# Patient Record
Sex: MALE | Race: WHITE | NOT HISPANIC OR LATINO | Employment: UNEMPLOYED | URBAN - METROPOLITAN AREA
[De-identification: names, ages, dates, MRNs, and addresses within clinical notes are randomized per-mention and may not be internally consistent; named-entity substitution may affect disease eponyms.]

---

## 2018-01-01 ENCOUNTER — APPOINTMENT (OUTPATIENT)
Dept: RADIOLOGY | Facility: MEDICAL CENTER | Age: 38
End: 2018-01-01
Attending: INTERNAL MEDICINE
Payer: MEDICAID

## 2018-01-01 ENCOUNTER — HOME CARE VISIT (OUTPATIENT)
Dept: HOSPICE | Facility: HOSPICE | Age: 38
End: 2018-01-01
Payer: MEDICAID

## 2018-01-01 ENCOUNTER — HOSPITAL ENCOUNTER (OUTPATIENT)
Facility: MEDICAL CENTER | Age: 38
End: 2018-07-31
Attending: EMERGENCY MEDICINE | Admitting: INTERNAL MEDICINE
Payer: MEDICAID

## 2018-01-01 ENCOUNTER — APPOINTMENT (OUTPATIENT)
Dept: RADIOLOGY | Facility: MEDICAL CENTER | Age: 38
End: 2018-01-01
Attending: EMERGENCY MEDICINE
Payer: MEDICAID

## 2018-01-01 ENCOUNTER — OFFICE VISIT (OUTPATIENT)
Dept: MEDICAL GROUP | Facility: MEDICAL CENTER | Age: 38
End: 2018-01-01
Attending: FAMILY MEDICINE
Payer: MEDICAID

## 2018-01-01 ENCOUNTER — TELEPHONE (OUTPATIENT)
Dept: CARDIOLOGY | Facility: MEDICAL CENTER | Age: 38
End: 2018-01-01

## 2018-01-01 ENCOUNTER — APPOINTMENT (OUTPATIENT)
Dept: RADIOLOGY | Facility: MEDICAL CENTER | Age: 38
DRG: 291 | End: 2018-01-01
Attending: EMERGENCY MEDICINE
Payer: MEDICAID

## 2018-01-01 ENCOUNTER — APPOINTMENT (OUTPATIENT)
Dept: RADIOLOGY | Facility: MEDICAL CENTER | Age: 38
End: 2018-01-01
Attending: NURSE PRACTITIONER
Payer: MEDICAID

## 2018-01-01 ENCOUNTER — OFFICE VISIT (OUTPATIENT)
Dept: CARDIOLOGY | Facility: MEDICAL CENTER | Age: 38
End: 2018-01-01
Payer: MEDICAID

## 2018-01-01 ENCOUNTER — HOSPITAL ENCOUNTER (OUTPATIENT)
Facility: MEDICAL CENTER | Age: 38
End: 2018-08-22
Attending: EMERGENCY MEDICINE | Admitting: HOSPITALIST
Payer: MEDICAID

## 2018-01-01 ENCOUNTER — PATIENT OUTREACH (OUTPATIENT)
Dept: HEALTH INFORMATION MANAGEMENT | Facility: OTHER | Age: 38
End: 2018-01-01

## 2018-01-01 ENCOUNTER — HOSPITAL ENCOUNTER (INPATIENT)
Facility: MEDICAL CENTER | Age: 38
LOS: 1 days | DRG: 291 | End: 2018-07-28
Attending: EMERGENCY MEDICINE | Admitting: HOSPITALIST
Payer: MEDICAID

## 2018-01-01 ENCOUNTER — TELEPHONE (OUTPATIENT)
Dept: MEDICAL GROUP | Facility: MEDICAL CENTER | Age: 38
End: 2018-01-01

## 2018-01-01 ENCOUNTER — HOSPICE ADMISSION (OUTPATIENT)
Dept: HOSPICE | Facility: HOSPICE | Age: 38
End: 2018-01-01
Payer: MEDICAID

## 2018-01-01 ENCOUNTER — HOSPITAL ENCOUNTER (EMERGENCY)
Facility: MEDICAL CENTER | Age: 38
End: 2018-06-26
Attending: EMERGENCY MEDICINE
Payer: MEDICAID

## 2018-01-01 VITALS
DIASTOLIC BLOOD PRESSURE: 76 MMHG | OXYGEN SATURATION: 97 % | HEIGHT: 72 IN | TEMPERATURE: 98.9 F | SYSTOLIC BLOOD PRESSURE: 110 MMHG | RESPIRATION RATE: 20 BRPM | HEART RATE: 111 BPM | WEIGHT: 222 LBS | BODY MASS INDEX: 30.07 KG/M2

## 2018-01-01 VITALS
RESPIRATION RATE: 18 BRPM | DIASTOLIC BLOOD PRESSURE: 70 MMHG | BODY MASS INDEX: 31.08 KG/M2 | SYSTOLIC BLOOD PRESSURE: 96 MMHG | WEIGHT: 229.5 LBS | TEMPERATURE: 98.8 F | HEIGHT: 72 IN | OXYGEN SATURATION: 90 % | HEART RATE: 87 BPM

## 2018-01-01 VITALS
TEMPERATURE: 97 F | HEIGHT: 72 IN | RESPIRATION RATE: 20 BRPM | OXYGEN SATURATION: 93 % | WEIGHT: 231 LBS | SYSTOLIC BLOOD PRESSURE: 116 MMHG | HEART RATE: 80 BPM | DIASTOLIC BLOOD PRESSURE: 76 MMHG | BODY MASS INDEX: 31.29 KG/M2

## 2018-01-01 VITALS
TEMPERATURE: 97.9 F | HEIGHT: 72 IN | DIASTOLIC BLOOD PRESSURE: 79 MMHG | OXYGEN SATURATION: 96 % | RESPIRATION RATE: 22 BRPM | BODY MASS INDEX: 30.34 KG/M2 | SYSTOLIC BLOOD PRESSURE: 143 MMHG | HEART RATE: 108 BPM | WEIGHT: 223.99 LBS

## 2018-01-01 VITALS
WEIGHT: 231.92 LBS | RESPIRATION RATE: 34 BRPM | OXYGEN SATURATION: 100 % | DIASTOLIC BLOOD PRESSURE: 84 MMHG | HEART RATE: 100 BPM | SYSTOLIC BLOOD PRESSURE: 123 MMHG | BODY MASS INDEX: 31.41 KG/M2 | TEMPERATURE: 97.6 F | HEIGHT: 72 IN

## 2018-01-01 VITALS
BODY MASS INDEX: 29.2 KG/M2 | OXYGEN SATURATION: 93 % | DIASTOLIC BLOOD PRESSURE: 74 MMHG | TEMPERATURE: 97 F | HEIGHT: 72 IN | WEIGHT: 215.61 LBS | SYSTOLIC BLOOD PRESSURE: 110 MMHG | HEART RATE: 96 BPM | RESPIRATION RATE: 16 BRPM

## 2018-01-01 VITALS
SYSTOLIC BLOOD PRESSURE: 124 MMHG | WEIGHT: 236 LBS | BODY MASS INDEX: 31.97 KG/M2 | HEART RATE: 112 BPM | HEIGHT: 72 IN | OXYGEN SATURATION: 93 % | DIASTOLIC BLOOD PRESSURE: 88 MMHG

## 2018-01-01 VITALS
BODY MASS INDEX: 31.13 KG/M2 | WEIGHT: 229.8 LBS | OXYGEN SATURATION: 97 % | HEIGHT: 72 IN | HEART RATE: 110 BPM | DIASTOLIC BLOOD PRESSURE: 62 MMHG | SYSTOLIC BLOOD PRESSURE: 110 MMHG

## 2018-01-01 VITALS
SYSTOLIC BLOOD PRESSURE: 112 MMHG | HEART RATE: 94 BPM | HEIGHT: 72 IN | BODY MASS INDEX: 32.1 KG/M2 | WEIGHT: 237 LBS | DIASTOLIC BLOOD PRESSURE: 82 MMHG | OXYGEN SATURATION: 95 %

## 2018-01-01 VITALS
DIASTOLIC BLOOD PRESSURE: 70 MMHG | WEIGHT: 239 LBS | SYSTOLIC BLOOD PRESSURE: 110 MMHG | BODY MASS INDEX: 32.37 KG/M2 | HEIGHT: 72 IN | OXYGEN SATURATION: 97 % | HEART RATE: 84 BPM

## 2018-01-01 DIAGNOSIS — I50.9 ACUTE ON CHRONIC CONGESTIVE HEART FAILURE, UNSPECIFIED HEART FAILURE TYPE (HCC): ICD-10-CM

## 2018-01-01 DIAGNOSIS — I42.7 CARDIOMYOPATHY SECONDARY TO DRUG (HCC): ICD-10-CM

## 2018-01-01 DIAGNOSIS — I50.20 ACC/AHA STAGE C SYSTOLIC HEART FAILURE (HCC): ICD-10-CM

## 2018-01-01 DIAGNOSIS — I51.89 LEFT VENTRICULAR SYSTOLIC DYSFUNCTION, NYHA CLASS 3: ICD-10-CM

## 2018-01-01 DIAGNOSIS — Z91.148 NONCOMPLIANCE WITH MEDICATION REGIMEN: ICD-10-CM

## 2018-01-01 DIAGNOSIS — I50.43 ACUTE ON CHRONIC COMBINED SYSTOLIC AND DIASTOLIC CONGESTIVE HEART FAILURE (HCC): ICD-10-CM

## 2018-01-01 DIAGNOSIS — R07.9 CHEST PAIN, UNSPECIFIED TYPE: ICD-10-CM

## 2018-01-01 DIAGNOSIS — Z79.899 HIGH RISK MEDICATION USE: ICD-10-CM

## 2018-01-01 DIAGNOSIS — F41.1 GENERALIZED ANXIETY DISORDER: ICD-10-CM

## 2018-01-01 DIAGNOSIS — R06.89 DIFFICULTY BREATHING: ICD-10-CM

## 2018-01-01 DIAGNOSIS — I50.20 NYHA CLASS 4 HEART FAILURE WITH REDUCED EJECTION FRACTION (HCC): ICD-10-CM

## 2018-01-01 DIAGNOSIS — I50.23 ACUTE ON CHRONIC SYSTOLIC CONGESTIVE HEART FAILURE (HCC): ICD-10-CM

## 2018-01-01 DIAGNOSIS — F32.A ANXIETY AND DEPRESSION: ICD-10-CM

## 2018-01-01 DIAGNOSIS — I50.9 HEART FAILURE, NYHA CLASS 3 (HCC): ICD-10-CM

## 2018-01-01 DIAGNOSIS — F19.10 POLYSUBSTANCE ABUSE (HCC): ICD-10-CM

## 2018-01-01 DIAGNOSIS — E66.9 OBESITY (BMI 30-39.9): ICD-10-CM

## 2018-01-01 DIAGNOSIS — I50.9 CHF, STAGE C (HCC): ICD-10-CM

## 2018-01-01 DIAGNOSIS — I50.42 CHRONIC COMBINED SYSTOLIC AND DIASTOLIC CONGESTIVE HEART FAILURE (HCC): ICD-10-CM

## 2018-01-01 DIAGNOSIS — F41.9 ANXIETY AND DEPRESSION: ICD-10-CM

## 2018-01-01 DIAGNOSIS — I50.20 NYHA CLASS 3 SYSTOLIC CONGESTIVE HEART FAILURE WITH REDUCED LEFT VENTRICULAR FUNCTION (HCC): ICD-10-CM

## 2018-01-01 DIAGNOSIS — I50.22 CHRONIC SYSTOLIC (CONGESTIVE) HEART FAILURE (HCC): ICD-10-CM

## 2018-01-01 DIAGNOSIS — R06.02 SOB (SHORTNESS OF BREATH): ICD-10-CM

## 2018-01-01 DIAGNOSIS — F41.9 ANXIETY: ICD-10-CM

## 2018-01-01 DIAGNOSIS — N52.9 ERECTILE DYSFUNCTION, UNSPECIFIED ERECTILE DYSFUNCTION TYPE: ICD-10-CM

## 2018-01-01 DIAGNOSIS — G47.00 INSOMNIA, UNSPECIFIED TYPE: ICD-10-CM

## 2018-01-01 DIAGNOSIS — R06.09 DOE (DYSPNEA ON EXERTION): ICD-10-CM

## 2018-01-01 LAB
ALBUMIN SERPL BCP-MCNC: 3.7 G/DL (ref 3.2–4.9)
ALBUMIN SERPL BCP-MCNC: 3.7 G/DL (ref 3.2–4.9)
ALBUMIN SERPL BCP-MCNC: 3.8 G/DL (ref 3.2–4.9)
ALBUMIN SERPL BCP-MCNC: 3.9 G/DL (ref 3.2–4.9)
ALBUMIN SERPL BCP-MCNC: 4.1 G/DL (ref 3.2–4.9)
ALBUMIN SERPL BCP-MCNC: 4.2 G/DL (ref 3.2–4.9)
ALBUMIN/GLOB SERPL: 1.2 G/DL
ALBUMIN/GLOB SERPL: 1.3 G/DL
ALBUMIN/GLOB SERPL: 1.3 G/DL
ALBUMIN/GLOB SERPL: 1.4 G/DL
ALBUMIN/GLOB SERPL: 1.5 G/DL
ALBUMIN/GLOB SERPL: 1.6 G/DL
ALP SERPL-CCNC: 39 U/L (ref 30–99)
ALP SERPL-CCNC: 46 U/L (ref 30–99)
ALP SERPL-CCNC: 46 U/L (ref 30–99)
ALP SERPL-CCNC: 47 U/L (ref 30–99)
ALP SERPL-CCNC: 47 U/L (ref 30–99)
ALP SERPL-CCNC: 55 U/L (ref 30–99)
ALT SERPL-CCNC: 137 U/L (ref 2–50)
ALT SERPL-CCNC: 21 U/L (ref 2–50)
ALT SERPL-CCNC: 43 U/L (ref 2–50)
ALT SERPL-CCNC: 43 U/L (ref 2–50)
ALT SERPL-CCNC: 54 U/L (ref 2–50)
ALT SERPL-CCNC: 57 U/L (ref 2–50)
AMPHET UR QL SCN: NEGATIVE
ANION GAP SERPL CALC-SCNC: 10 MMOL/L (ref 0–11.9)
ANION GAP SERPL CALC-SCNC: 10 MMOL/L (ref 0–11.9)
ANION GAP SERPL CALC-SCNC: 12 MMOL/L (ref 0–11.9)
ANION GAP SERPL CALC-SCNC: 8 MMOL/L (ref 0–11.9)
ANION GAP SERPL CALC-SCNC: 9 MMOL/L (ref 0–11.9)
ANION GAP SERPL CALC-SCNC: 9 MMOL/L (ref 0–11.9)
APPEARANCE UR: CLEAR
APPEARANCE UR: CLEAR
APTT PPP: 29.3 SEC (ref 24.7–36)
AST SERPL-CCNC: 18 U/L (ref 12–45)
AST SERPL-CCNC: 19 U/L (ref 12–45)
AST SERPL-CCNC: 20 U/L (ref 12–45)
AST SERPL-CCNC: 22 U/L (ref 12–45)
AST SERPL-CCNC: 31 U/L (ref 12–45)
AST SERPL-CCNC: 35 U/L (ref 12–45)
BACTERIA BLD CULT: NORMAL
BACTERIA UR CULT: NORMAL
BARBITURATES UR QL SCN: NEGATIVE
BASOPHILS # BLD AUTO: 0.6 % (ref 0–1.8)
BASOPHILS # BLD AUTO: 0.7 % (ref 0–1.8)
BASOPHILS # BLD AUTO: 0.8 % (ref 0–1.8)
BASOPHILS # BLD AUTO: 0.8 % (ref 0–1.8)
BASOPHILS # BLD AUTO: 0.9 % (ref 0–1.8)
BASOPHILS # BLD AUTO: 1 % (ref 0–1.8)
BASOPHILS # BLD AUTO: 1.3 % (ref 0–1.8)
BASOPHILS # BLD AUTO: 1.3 % (ref 0–1.8)
BASOPHILS # BLD: 0.06 K/UL (ref 0–0.12)
BASOPHILS # BLD: 0.07 K/UL (ref 0–0.12)
BASOPHILS # BLD: 0.08 K/UL (ref 0–0.12)
BASOPHILS # BLD: 0.09 K/UL (ref 0–0.12)
BASOPHILS # BLD: 0.1 K/UL (ref 0–0.12)
BASOPHILS # BLD: 0.13 K/UL (ref 0–0.12)
BENZODIAZ UR QL SCN: NEGATIVE
BENZODIAZ UR QL SCN: POSITIVE
BENZODIAZ UR QL SCN: POSITIVE
BILIRUB SERPL-MCNC: 0.5 MG/DL (ref 0.1–1.5)
BILIRUB SERPL-MCNC: 0.6 MG/DL (ref 0.1–1.5)
BILIRUB SERPL-MCNC: 0.7 MG/DL (ref 0.1–1.5)
BILIRUB SERPL-MCNC: 0.7 MG/DL (ref 0.1–1.5)
BILIRUB SERPL-MCNC: 0.9 MG/DL (ref 0.1–1.5)
BILIRUB SERPL-MCNC: 1 MG/DL (ref 0.1–1.5)
BILIRUB UR QL STRIP.AUTO: NEGATIVE
BILIRUB UR QL STRIP.AUTO: NEGATIVE
BNP SERPL-MCNC: 382 PG/ML (ref 0–100)
BNP SERPL-MCNC: 598 PG/ML (ref 0–100)
BNP SERPL-MCNC: 640 PG/ML (ref 0–100)
BNP SERPL-MCNC: 641 PG/ML (ref 0–100)
BNP SERPL-MCNC: 714 PG/ML (ref 0–100)
BUN SERPL-MCNC: 14 MG/DL (ref 8–22)
BUN SERPL-MCNC: 15 MG/DL (ref 8–22)
BUN SERPL-MCNC: 16 MG/DL (ref 8–22)
BUN SERPL-MCNC: 17 MG/DL (ref 8–22)
BUN SERPL-MCNC: 20 MG/DL (ref 8–22)
BUN SERPL-MCNC: 21 MG/DL (ref 8–22)
BUN SERPL-MCNC: 22 MG/DL (ref 8–22)
BUN SERPL-MCNC: 25 MG/DL (ref 8–22)
BZE UR QL SCN: NEGATIVE
CALCIUM SERPL-MCNC: 8.8 MG/DL (ref 8.5–10.5)
CALCIUM SERPL-MCNC: 8.9 MG/DL (ref 8.5–10.5)
CALCIUM SERPL-MCNC: 9 MG/DL (ref 8.5–10.5)
CALCIUM SERPL-MCNC: 9.1 MG/DL (ref 8.5–10.5)
CALCIUM SERPL-MCNC: 9.1 MG/DL (ref 8.5–10.5)
CALCIUM SERPL-MCNC: 9.8 MG/DL (ref 8.5–10.5)
CANNABINOIDS UR QL SCN: POSITIVE
CHLORIDE SERPL-SCNC: 100 MMOL/L (ref 96–112)
CHLORIDE SERPL-SCNC: 101 MMOL/L (ref 96–112)
CHLORIDE SERPL-SCNC: 102 MMOL/L (ref 96–112)
CHLORIDE SERPL-SCNC: 104 MMOL/L (ref 96–112)
CHLORIDE SERPL-SCNC: 104 MMOL/L (ref 96–112)
CHLORIDE SERPL-SCNC: 106 MMOL/L (ref 96–112)
CHLORIDE SERPL-SCNC: 107 MMOL/L (ref 96–112)
CHLORIDE SERPL-SCNC: 107 MMOL/L (ref 96–112)
CHOLEST SERPL-MCNC: 150 MG/DL (ref 100–199)
CK MB SERPL-MCNC: 1.4 NG/ML (ref 0–5)
CK SERPL-CCNC: 49 U/L (ref 0–154)
CO2 SERPL-SCNC: 19 MMOL/L (ref 20–33)
CO2 SERPL-SCNC: 20 MMOL/L (ref 20–33)
CO2 SERPL-SCNC: 23 MMOL/L (ref 20–33)
CO2 SERPL-SCNC: 24 MMOL/L (ref 20–33)
CO2 SERPL-SCNC: 25 MMOL/L (ref 20–33)
CO2 SERPL-SCNC: 26 MMOL/L (ref 20–33)
COLOR UR: YELLOW
COLOR UR: YELLOW
CREAT SERPL-MCNC: 0.7 MG/DL (ref 0.5–1.4)
CREAT SERPL-MCNC: 0.78 MG/DL (ref 0.5–1.4)
CREAT SERPL-MCNC: 0.86 MG/DL (ref 0.5–1.4)
CREAT SERPL-MCNC: 0.89 MG/DL (ref 0.5–1.4)
CREAT SERPL-MCNC: 0.91 MG/DL (ref 0.5–1.4)
CREAT SERPL-MCNC: 0.91 MG/DL (ref 0.5–1.4)
CREAT SERPL-MCNC: 1.08 MG/DL (ref 0.5–1.4)
CREAT SERPL-MCNC: 1.14 MG/DL (ref 0.5–1.4)
CRP SERPL HS-MCNC: 1.94 MG/DL (ref 0–0.75)
DEPRECATED D DIMER PPP IA-ACNC: 209 NG/ML(D-DU)
DEPRECATED D DIMER PPP IA-ACNC: 238 NG/ML(D-DU)
EKG IMPRESSION: NORMAL
EOSINOPHIL # BLD AUTO: 0.17 K/UL (ref 0–0.51)
EOSINOPHIL # BLD AUTO: 0.17 K/UL (ref 0–0.51)
EOSINOPHIL # BLD AUTO: 0.23 K/UL (ref 0–0.51)
EOSINOPHIL # BLD AUTO: 0.27 K/UL (ref 0–0.51)
EOSINOPHIL # BLD AUTO: 0.31 K/UL (ref 0–0.51)
EOSINOPHIL # BLD AUTO: 0.31 K/UL (ref 0–0.51)
EOSINOPHIL # BLD AUTO: 0.41 K/UL (ref 0–0.51)
EOSINOPHIL # BLD AUTO: 0.42 K/UL (ref 0–0.51)
EOSINOPHIL NFR BLD: 1.6 % (ref 0–6.9)
EOSINOPHIL NFR BLD: 1.7 % (ref 0–6.9)
EOSINOPHIL NFR BLD: 2 % (ref 0–6.9)
EOSINOPHIL NFR BLD: 2.5 % (ref 0–6.9)
EOSINOPHIL NFR BLD: 2.9 % (ref 0–6.9)
EOSINOPHIL NFR BLD: 3.1 % (ref 0–6.9)
EOSINOPHIL NFR BLD: 5.2 % (ref 0–6.9)
EOSINOPHIL NFR BLD: 6.6 % (ref 0–6.9)
ERYTHROCYTE [DISTWIDTH] IN BLOOD BY AUTOMATED COUNT: 45.1 FL (ref 35.9–50)
ERYTHROCYTE [DISTWIDTH] IN BLOOD BY AUTOMATED COUNT: 45.7 FL (ref 35.9–50)
ERYTHROCYTE [DISTWIDTH] IN BLOOD BY AUTOMATED COUNT: 47.4 FL (ref 35.9–50)
ERYTHROCYTE [DISTWIDTH] IN BLOOD BY AUTOMATED COUNT: 47.5 FL (ref 35.9–50)
ERYTHROCYTE [DISTWIDTH] IN BLOOD BY AUTOMATED COUNT: 47.9 FL (ref 35.9–50)
ERYTHROCYTE [DISTWIDTH] IN BLOOD BY AUTOMATED COUNT: 48.6 FL (ref 35.9–50)
ERYTHROCYTE [DISTWIDTH] IN BLOOD BY AUTOMATED COUNT: 48.7 FL (ref 35.9–50)
ERYTHROCYTE [DISTWIDTH] IN BLOOD BY AUTOMATED COUNT: 49.3 FL (ref 35.9–50)
ERYTHROCYTE [SEDIMENTATION RATE] IN BLOOD BY WESTERGREN METHOD: 10 MM/HOUR (ref 0–15)
EST. AVERAGE GLUCOSE BLD GHB EST-MCNC: 111 MG/DL
EST. AVERAGE GLUCOSE BLD GHB EST-MCNC: 117 MG/DL
EST. AVERAGE GLUCOSE BLD GHB EST-MCNC: 120 MG/DL
GLOBULIN SER CALC-MCNC: 2.6 G/DL (ref 1.9–3.5)
GLOBULIN SER CALC-MCNC: 2.7 G/DL (ref 1.9–3.5)
GLOBULIN SER CALC-MCNC: 2.8 G/DL (ref 1.9–3.5)
GLOBULIN SER CALC-MCNC: 2.9 G/DL (ref 1.9–3.5)
GLOBULIN SER CALC-MCNC: 2.9 G/DL (ref 1.9–3.5)
GLOBULIN SER CALC-MCNC: 3.3 G/DL (ref 1.9–3.5)
GLUCOSE SERPL-MCNC: 106 MG/DL (ref 65–99)
GLUCOSE SERPL-MCNC: 108 MG/DL (ref 65–99)
GLUCOSE SERPL-MCNC: 117 MG/DL (ref 65–99)
GLUCOSE SERPL-MCNC: 126 MG/DL (ref 65–99)
GLUCOSE SERPL-MCNC: 172 MG/DL (ref 65–99)
GLUCOSE SERPL-MCNC: 203 MG/DL (ref 65–99)
GLUCOSE SERPL-MCNC: 84 MG/DL (ref 65–99)
GLUCOSE SERPL-MCNC: 87 MG/DL (ref 65–99)
GLUCOSE UR STRIP.AUTO-MCNC: NEGATIVE MG/DL
GLUCOSE UR STRIP.AUTO-MCNC: NEGATIVE MG/DL
HBA1C MFR BLD: 5.5 % (ref 0–5.6)
HBA1C MFR BLD: 5.7 % (ref 0–5.6)
HBA1C MFR BLD: 5.8 % (ref 0–5.6)
HCT VFR BLD AUTO: 36.5 % (ref 42–52)
HCT VFR BLD AUTO: 38.2 % (ref 42–52)
HCT VFR BLD AUTO: 38.2 % (ref 42–52)
HCT VFR BLD AUTO: 38.5 % (ref 42–52)
HCT VFR BLD AUTO: 38.7 % (ref 42–52)
HCT VFR BLD AUTO: 40.6 % (ref 42–52)
HCT VFR BLD AUTO: 41.1 % (ref 42–52)
HCT VFR BLD AUTO: 47.2 % (ref 42–52)
HDLC SERPL-MCNC: 19 MG/DL
HGB BLD-MCNC: 11.8 G/DL (ref 14–18)
HGB BLD-MCNC: 12.2 G/DL (ref 14–18)
HGB BLD-MCNC: 12.4 G/DL (ref 14–18)
HGB BLD-MCNC: 12.5 G/DL (ref 14–18)
HGB BLD-MCNC: 12.5 G/DL (ref 14–18)
HGB BLD-MCNC: 12.7 G/DL (ref 14–18)
HGB BLD-MCNC: 12.9 G/DL (ref 14–18)
HGB BLD-MCNC: 14.9 G/DL (ref 14–18)
IMM GRANULOCYTES # BLD AUTO: 0.03 K/UL (ref 0–0.11)
IMM GRANULOCYTES # BLD AUTO: 0.04 K/UL (ref 0–0.11)
IMM GRANULOCYTES # BLD AUTO: 0.04 K/UL (ref 0–0.11)
IMM GRANULOCYTES # BLD AUTO: 0.05 K/UL (ref 0–0.11)
IMM GRANULOCYTES # BLD AUTO: 0.05 K/UL (ref 0–0.11)
IMM GRANULOCYTES # BLD AUTO: 0.17 K/UL (ref 0–0.11)
IMM GRANULOCYTES NFR BLD AUTO: 0.3 % (ref 0–0.9)
IMM GRANULOCYTES NFR BLD AUTO: 0.4 % (ref 0–0.9)
IMM GRANULOCYTES NFR BLD AUTO: 0.5 % (ref 0–0.9)
IMM GRANULOCYTES NFR BLD AUTO: 1.3 % (ref 0–0.9)
INR PPP: 1.15 (ref 0.87–1.13)
KETONES UR STRIP.AUTO-MCNC: NEGATIVE MG/DL
KETONES UR STRIP.AUTO-MCNC: NEGATIVE MG/DL
LACTATE BLD-SCNC: 1.7 MMOL/L (ref 0.5–2)
LACTATE BLD-SCNC: 1.9 MMOL/L (ref 0.5–2)
LACTATE BLD-SCNC: 2 MMOL/L (ref 0.5–2)
LACTATE BLD-SCNC: 2.1 MMOL/L (ref 0.5–2)
LACTATE BLD-SCNC: 2.3 MMOL/L (ref 0.5–2)
LACTATE BLD-SCNC: 3.1 MMOL/L (ref 0.5–2)
LDLC SERPL CALC-MCNC: 96 MG/DL
LEUKOCYTE ESTERASE UR QL STRIP.AUTO: NEGATIVE
LEUKOCYTE ESTERASE UR QL STRIP.AUTO: NEGATIVE
LIPASE SERPL-CCNC: 38 U/L (ref 11–82)
LV EJECT FRACT  99904: 20
LV EJECT FRACT MOD 2C 99903: 19.09
LV EJECT FRACT MOD 4C 99902: 22.05
LV EJECT FRACT MOD BP 99901: 19.18
LYMPHOCYTES # BLD AUTO: 1.89 K/UL (ref 1–4.8)
LYMPHOCYTES # BLD AUTO: 2.52 K/UL (ref 1–4.8)
LYMPHOCYTES # BLD AUTO: 2.59 K/UL (ref 1–4.8)
LYMPHOCYTES # BLD AUTO: 2.66 K/UL (ref 1–4.8)
LYMPHOCYTES # BLD AUTO: 2.68 K/UL (ref 1–4.8)
LYMPHOCYTES # BLD AUTO: 2.81 K/UL (ref 1–4.8)
LYMPHOCYTES # BLD AUTO: 2.85 K/UL (ref 1–4.8)
LYMPHOCYTES # BLD AUTO: 3.05 K/UL (ref 1–4.8)
LYMPHOCYTES NFR BLD: 19.5 % (ref 22–41)
LYMPHOCYTES NFR BLD: 23.3 % (ref 22–41)
LYMPHOCYTES NFR BLD: 26.2 % (ref 22–41)
LYMPHOCYTES NFR BLD: 28.4 % (ref 22–41)
LYMPHOCYTES NFR BLD: 28.6 % (ref 22–41)
LYMPHOCYTES NFR BLD: 29.6 % (ref 22–41)
LYMPHOCYTES NFR BLD: 31.1 % (ref 22–41)
LYMPHOCYTES NFR BLD: 34 % (ref 22–41)
MAGNESIUM SERPL-MCNC: 2.3 MG/DL (ref 1.5–2.5)
MAGNESIUM SERPL-MCNC: 2.3 MG/DL (ref 1.5–2.5)
MCH RBC QN AUTO: 26.6 PG (ref 27–33)
MCH RBC QN AUTO: 27.3 PG (ref 27–33)
MCH RBC QN AUTO: 28.4 PG (ref 27–33)
MCH RBC QN AUTO: 28.9 PG (ref 27–33)
MCH RBC QN AUTO: 29.4 PG (ref 27–33)
MCH RBC QN AUTO: 29.5 PG (ref 27–33)
MCH RBC QN AUTO: 29.9 PG (ref 27–33)
MCH RBC QN AUTO: 30.9 PG (ref 27–33)
MCHC RBC AUTO-ENTMCNC: 30.9 G/DL (ref 33.7–35.3)
MCHC RBC AUTO-ENTMCNC: 30.9 G/DL (ref 33.7–35.3)
MCHC RBC AUTO-ENTMCNC: 31.6 G/DL (ref 33.7–35.3)
MCHC RBC AUTO-ENTMCNC: 31.8 G/DL (ref 33.7–35.3)
MCHC RBC AUTO-ENTMCNC: 31.9 G/DL (ref 33.7–35.3)
MCHC RBC AUTO-ENTMCNC: 32.2 G/DL (ref 33.7–35.3)
MCHC RBC AUTO-ENTMCNC: 32.3 G/DL (ref 33.7–35.3)
MCHC RBC AUTO-ENTMCNC: 34.2 G/DL (ref 33.7–35.3)
MCV RBC AUTO: 84.1 FL (ref 81.4–97.8)
MCV RBC AUTO: 88.4 FL (ref 81.4–97.8)
MCV RBC AUTO: 89.2 FL (ref 81.4–97.8)
MCV RBC AUTO: 90.1 FL (ref 81.4–97.8)
MCV RBC AUTO: 91.1 FL (ref 81.4–97.8)
MCV RBC AUTO: 92.5 FL (ref 81.4–97.8)
MCV RBC AUTO: 92.8 FL (ref 81.4–97.8)
MCV RBC AUTO: 93.6 FL (ref 81.4–97.8)
METHADONE UR QL SCN: NEGATIVE
MICRO URNS: NORMAL
MICRO URNS: NORMAL
MONOCYTES # BLD AUTO: 0.59 K/UL (ref 0–0.85)
MONOCYTES # BLD AUTO: 0.61 K/UL (ref 0–0.85)
MONOCYTES # BLD AUTO: 0.62 K/UL (ref 0–0.85)
MONOCYTES # BLD AUTO: 0.64 K/UL (ref 0–0.85)
MONOCYTES # BLD AUTO: 0.68 K/UL (ref 0–0.85)
MONOCYTES # BLD AUTO: 0.76 K/UL (ref 0–0.85)
MONOCYTES # BLD AUTO: 0.78 K/UL (ref 0–0.85)
MONOCYTES # BLD AUTO: 0.81 K/UL (ref 0–0.85)
MONOCYTES NFR BLD AUTO: 12.7 % (ref 0–13.4)
MONOCYTES NFR BLD AUTO: 4.8 % (ref 0–13.4)
MONOCYTES NFR BLD AUTO: 5.4 % (ref 0–13.4)
MONOCYTES NFR BLD AUTO: 5.6 % (ref 0–13.4)
MONOCYTES NFR BLD AUTO: 6.3 % (ref 0–13.4)
MONOCYTES NFR BLD AUTO: 6.4 % (ref 0–13.4)
MONOCYTES NFR BLD AUTO: 9.1 % (ref 0–13.4)
MONOCYTES NFR BLD AUTO: 9.7 % (ref 0–13.4)
NEUTROPHILS # BLD AUTO: 3.16 K/UL (ref 1.82–7.42)
NEUTROPHILS # BLD AUTO: 3.86 K/UL (ref 1.82–7.42)
NEUTROPHILS # BLD AUTO: 4.87 K/UL (ref 1.82–7.42)
NEUTROPHILS # BLD AUTO: 6.04 K/UL (ref 1.82–7.42)
NEUTROPHILS # BLD AUTO: 6.54 K/UL (ref 1.82–7.42)
NEUTROPHILS # BLD AUTO: 6.97 K/UL (ref 1.82–7.42)
NEUTROPHILS # BLD AUTO: 7.42 K/UL (ref 1.82–7.42)
NEUTROPHILS # BLD AUTO: 9.49 K/UL (ref 1.82–7.42)
NEUTROPHILS NFR BLD: 49.3 % (ref 44–72)
NEUTROPHILS NFR BLD: 49.4 % (ref 44–72)
NEUTROPHILS NFR BLD: 56.6 % (ref 44–72)
NEUTROPHILS NFR BLD: 60.9 % (ref 44–72)
NEUTROPHILS NFR BLD: 61.4 % (ref 44–72)
NEUTROPHILS NFR BLD: 64.2 % (ref 44–72)
NEUTROPHILS NFR BLD: 68.5 % (ref 44–72)
NEUTROPHILS NFR BLD: 71.7 % (ref 44–72)
NITRITE UR QL STRIP.AUTO: NEGATIVE
NITRITE UR QL STRIP.AUTO: NEGATIVE
NRBC # BLD AUTO: 0 K/UL
NRBC # BLD AUTO: 0.02 K/UL
NRBC # BLD AUTO: 0.03 K/UL
NRBC BLD-RTO: 0 /100 WBC
NRBC BLD-RTO: 0.2 /100 WBC
NRBC BLD-RTO: 0.3 /100 WBC
OPIATES UR QL SCN: NEGATIVE
OXYCODONE UR QL SCN: NEGATIVE
PCP UR QL SCN: NEGATIVE
PH UR STRIP.AUTO: 5 [PH]
PH UR STRIP.AUTO: 6.5 [PH]
PHOSPHATE SERPL-MCNC: 6.2 MG/DL (ref 2.5–4.5)
PLATELET # BLD AUTO: 238 K/UL (ref 164–446)
PLATELET # BLD AUTO: 250 K/UL (ref 164–446)
PLATELET # BLD AUTO: 253 K/UL (ref 164–446)
PLATELET # BLD AUTO: 318 K/UL (ref 164–446)
PLATELET # BLD AUTO: 359 K/UL (ref 164–446)
PLATELET # BLD AUTO: 378 K/UL (ref 164–446)
PLATELET # BLD AUTO: 391 K/UL (ref 164–446)
PLATELET # BLD AUTO: 395 K/UL (ref 164–446)
PMV BLD AUTO: 10 FL (ref 9–12.9)
PMV BLD AUTO: 10.2 FL (ref 9–12.9)
PMV BLD AUTO: 10.3 FL (ref 9–12.9)
PMV BLD AUTO: 10.4 FL (ref 9–12.9)
PMV BLD AUTO: 10.4 FL (ref 9–12.9)
PMV BLD AUTO: 9.7 FL (ref 9–12.9)
PMV BLD AUTO: 9.7 FL (ref 9–12.9)
PMV BLD AUTO: 9.8 FL (ref 9–12.9)
POTASSIUM SERPL-SCNC: 3.7 MMOL/L (ref 3.6–5.5)
POTASSIUM SERPL-SCNC: 4 MMOL/L (ref 3.6–5.5)
POTASSIUM SERPL-SCNC: 4.1 MMOL/L (ref 3.6–5.5)
POTASSIUM SERPL-SCNC: 4.3 MMOL/L (ref 3.6–5.5)
POTASSIUM SERPL-SCNC: 4.3 MMOL/L (ref 3.6–5.5)
POTASSIUM SERPL-SCNC: 4.4 MMOL/L (ref 3.6–5.5)
POTASSIUM SERPL-SCNC: 4.4 MMOL/L (ref 3.6–5.5)
POTASSIUM SERPL-SCNC: 4.6 MMOL/L (ref 3.6–5.5)
PROCALCITONIN SERPL-MCNC: 0.09 NG/ML
PROLACTIN SERPL-MCNC: 16.06 NG/ML (ref 2.1–17.7)
PROPOXYPH UR QL SCN: NEGATIVE
PROT SERPL-MCNC: 6.5 G/DL (ref 6–8.2)
PROT SERPL-MCNC: 6.6 G/DL (ref 6–8.2)
PROT SERPL-MCNC: 6.6 G/DL (ref 6–8.2)
PROT SERPL-MCNC: 6.7 G/DL (ref 6–8.2)
PROT SERPL-MCNC: 7 G/DL (ref 6–8.2)
PROT SERPL-MCNC: 7.2 G/DL (ref 6–8.2)
PROT UR QL STRIP: NEGATIVE MG/DL
PROT UR QL STRIP: NEGATIVE MG/DL
PROTHROMBIN TIME: 14.4 SEC (ref 12–14.6)
RBC # BLD AUTO: 4.05 M/UL (ref 4.7–6.1)
RBC # BLD AUTO: 4.08 M/UL (ref 4.7–6.1)
RBC # BLD AUTO: 4.13 M/UL (ref 4.7–6.1)
RBC # BLD AUTO: 4.15 M/UL (ref 4.7–6.1)
RBC # BLD AUTO: 4.25 M/UL (ref 4.7–6.1)
RBC # BLD AUTO: 4.55 M/UL (ref 4.7–6.1)
RBC # BLD AUTO: 4.65 M/UL (ref 4.7–6.1)
RBC # BLD AUTO: 5.61 M/UL (ref 4.7–6.1)
RBC UR QL AUTO: NEGATIVE
RBC UR QL AUTO: NEGATIVE
SIGNIFICANT IND 70042: NORMAL
SITE SITE: NORMAL
SODIUM SERPL-SCNC: 133 MMOL/L (ref 135–145)
SODIUM SERPL-SCNC: 136 MMOL/L (ref 135–145)
SODIUM SERPL-SCNC: 137 MMOL/L (ref 135–145)
SODIUM SERPL-SCNC: 138 MMOL/L (ref 135–145)
SODIUM SERPL-SCNC: 142 MMOL/L (ref 135–145)
SOURCE SOURCE: NORMAL
SP GR UR STRIP.AUTO: 1.01
SP GR UR STRIP.AUTO: 1.02
TRIGL SERPL-MCNC: 176 MG/DL (ref 0–149)
TROPONIN I SERPL-MCNC: 0.02 NG/ML (ref 0–0.04)
TROPONIN I SERPL-MCNC: 0.02 NG/ML (ref 0–0.04)
TROPONIN I SERPL-MCNC: 0.03 NG/ML (ref 0–0.04)
TSH SERPL DL<=0.005 MIU/L-ACNC: 1.04 UIU/ML (ref 0.38–5.33)
UROBILINOGEN UR STRIP.AUTO-MCNC: 0.2 MG/DL
UROBILINOGEN UR STRIP.AUTO-MCNC: 0.2 MG/DL
WBC # BLD AUTO: 10.7 K/UL (ref 4.8–10.8)
WBC # BLD AUTO: 10.8 K/UL (ref 4.8–10.8)
WBC # BLD AUTO: 10.9 K/UL (ref 4.8–10.8)
WBC # BLD AUTO: 13.3 K/UL (ref 4.8–10.8)
WBC # BLD AUTO: 6.4 K/UL (ref 4.8–10.8)
WBC # BLD AUTO: 7.8 K/UL (ref 4.8–10.8)
WBC # BLD AUTO: 8.6 K/UL (ref 4.8–10.8)
WBC # BLD AUTO: 9.9 K/UL (ref 4.8–10.8)

## 2018-01-01 PROCEDURE — 700105 HCHG RX REV CODE 258: Performed by: EMERGENCY MEDICINE

## 2018-01-01 PROCEDURE — 700102 HCHG RX REV CODE 250 W/ 637 OVERRIDE(OP): Performed by: NURSE PRACTITIONER

## 2018-01-01 PROCEDURE — 71045 X-RAY EXAM CHEST 1 VIEW: CPT

## 2018-01-01 PROCEDURE — G0378 HOSPITAL OBSERVATION PER HR: HCPCS

## 2018-01-01 PROCEDURE — 99214 OFFICE O/P EST MOD 30 MIN: CPT | Performed by: NURSE PRACTITIONER

## 2018-01-01 PROCEDURE — 36415 COLL VENOUS BLD VENIPUNCTURE: CPT

## 2018-01-01 PROCEDURE — 700102 HCHG RX REV CODE 250 W/ 637 OVERRIDE(OP): Performed by: PSYCHIATRY & NEUROLOGY

## 2018-01-01 PROCEDURE — 99225 PR SUBSEQUENT OBSERVATION CARE,LEVEL II: CPT | Performed by: FAMILY MEDICINE

## 2018-01-01 PROCEDURE — G8980 MOBILITY D/C STATUS: HCPCS | Mod: CI

## 2018-01-01 PROCEDURE — 80053 COMPREHEN METABOLIC PANEL: CPT

## 2018-01-01 PROCEDURE — 99284 EMERGENCY DEPT VISIT MOD MDM: CPT

## 2018-01-01 PROCEDURE — 99214 OFFICE O/P EST MOD 30 MIN: CPT | Performed by: INTERNAL MEDICINE

## 2018-01-01 PROCEDURE — A9270 NON-COVERED ITEM OR SERVICE: HCPCS | Performed by: INTERNAL MEDICINE

## 2018-01-01 PROCEDURE — 80307 DRUG TEST PRSMV CHEM ANLYZR: CPT

## 2018-01-01 PROCEDURE — 93005 ELECTROCARDIOGRAM TRACING: CPT

## 2018-01-01 PROCEDURE — A9270 NON-COVERED ITEM OR SERVICE: HCPCS | Performed by: FAMILY MEDICINE

## 2018-01-01 PROCEDURE — 83880 ASSAY OF NATRIURETIC PEPTIDE: CPT

## 2018-01-01 PROCEDURE — 99220 PR INITIAL OBSERVATION CARE,LEVL III: CPT | Performed by: HOSPITALIST

## 2018-01-01 PROCEDURE — 700102 HCHG RX REV CODE 250 W/ 637 OVERRIDE(OP): Performed by: HOSPITALIST

## 2018-01-01 PROCEDURE — 700111 HCHG RX REV CODE 636 W/ 250 OVERRIDE (IP): Performed by: FAMILY MEDICINE

## 2018-01-01 PROCEDURE — 99223 1ST HOSP IP/OBS HIGH 75: CPT | Performed by: HOSPITALIST

## 2018-01-01 PROCEDURE — 94760 N-INVAS EAR/PLS OXIMETRY 1: CPT

## 2018-01-01 PROCEDURE — 304561 HCHG STAT O2

## 2018-01-01 PROCEDURE — 700102 HCHG RX REV CODE 250 W/ 637 OVERRIDE(OP): Performed by: INTERNAL MEDICINE

## 2018-01-01 PROCEDURE — 99214 OFFICE O/P EST MOD 30 MIN: CPT | Performed by: FAMILY MEDICINE

## 2018-01-01 PROCEDURE — A9270 NON-COVERED ITEM OR SERVICE: HCPCS | Performed by: NURSE PRACTITIONER

## 2018-01-01 PROCEDURE — 80061 LIPID PANEL: CPT

## 2018-01-01 PROCEDURE — 80048 BASIC METABOLIC PNL TOTAL CA: CPT

## 2018-01-01 PROCEDURE — 81003 URINALYSIS AUTO W/O SCOPE: CPT

## 2018-01-01 PROCEDURE — 99224 PR SUBSEQUENT OBSERVATION CARE,LEVEL I: CPT | Performed by: FAMILY MEDICINE

## 2018-01-01 PROCEDURE — 83036 HEMOGLOBIN GLYCOSYLATED A1C: CPT

## 2018-01-01 PROCEDURE — 99225 PR SUBSEQUENT OBSERVATION CARE,LEVEL II: CPT | Performed by: HOSPITALIST

## 2018-01-01 PROCEDURE — 700102 HCHG RX REV CODE 250 W/ 637 OVERRIDE(OP): Performed by: FAMILY MEDICINE

## 2018-01-01 PROCEDURE — 700111 HCHG RX REV CODE 636 W/ 250 OVERRIDE (IP): Performed by: INTERNAL MEDICINE

## 2018-01-01 PROCEDURE — 99217 PR OBSERVATION CARE DISCHARGE: CPT | Performed by: HOSPITALIST

## 2018-01-01 PROCEDURE — 99203 OFFICE O/P NEW LOW 30 MIN: CPT | Performed by: FAMILY MEDICINE

## 2018-01-01 PROCEDURE — 83690 ASSAY OF LIPASE: CPT

## 2018-01-01 PROCEDURE — 700102 HCHG RX REV CODE 250 W/ 637 OVERRIDE(OP): Performed by: EMERGENCY MEDICINE

## 2018-01-01 PROCEDURE — 96375 TX/PRO/DX INJ NEW DRUG ADDON: CPT

## 2018-01-01 PROCEDURE — 700117 HCHG RX CONTRAST REV CODE 255: Performed by: INTERNAL MEDICINE

## 2018-01-01 PROCEDURE — A9270 NON-COVERED ITEM OR SERVICE: HCPCS | Performed by: STUDENT IN AN ORGANIZED HEALTH CARE EDUCATION/TRAINING PROGRAM

## 2018-01-01 PROCEDURE — 93308 TTE F-UP OR LMTD: CPT | Mod: 26 | Performed by: INTERNAL MEDICINE

## 2018-01-01 PROCEDURE — 93970 EXTREMITY STUDY: CPT

## 2018-01-01 PROCEDURE — 87040 BLOOD CULTURE FOR BACTERIA: CPT

## 2018-01-01 PROCEDURE — 84145 PROCALCITONIN (PCT): CPT

## 2018-01-01 PROCEDURE — 97165 OT EVAL LOW COMPLEX 30 MIN: CPT

## 2018-01-01 PROCEDURE — 700111 HCHG RX REV CODE 636 W/ 250 OVERRIDE (IP): Performed by: EMERGENCY MEDICINE

## 2018-01-01 PROCEDURE — 93005 ELECTROCARDIOGRAM TRACING: CPT | Performed by: INTERNAL MEDICINE

## 2018-01-01 PROCEDURE — 84484 ASSAY OF TROPONIN QUANT: CPT

## 2018-01-01 PROCEDURE — A9270 NON-COVERED ITEM OR SERVICE: HCPCS | Performed by: PSYCHIATRY & NEUROLOGY

## 2018-01-01 PROCEDURE — A9270 NON-COVERED ITEM OR SERVICE: HCPCS | Performed by: HOSPITALIST

## 2018-01-01 PROCEDURE — 700102 HCHG RX REV CODE 250 W/ 637 OVERRIDE(OP): Performed by: STUDENT IN AN ORGANIZED HEALTH CARE EDUCATION/TRAINING PROGRAM

## 2018-01-01 PROCEDURE — 99213 OFFICE O/P EST LOW 20 MIN: CPT | Performed by: FAMILY MEDICINE

## 2018-01-01 PROCEDURE — 93306 TTE W/DOPPLER COMPLETE: CPT

## 2018-01-01 PROCEDURE — 85025 COMPLETE CBC W/AUTO DIFF WBC: CPT

## 2018-01-01 PROCEDURE — 74018 RADEX ABDOMEN 1 VIEW: CPT

## 2018-01-01 PROCEDURE — 99214 OFFICE O/P EST MOD 30 MIN: CPT | Mod: 25 | Performed by: NURSE PRACTITIONER

## 2018-01-01 PROCEDURE — 85379 FIBRIN DEGRADATION QUANT: CPT

## 2018-01-01 PROCEDURE — 93308 TTE F-UP OR LMTD: CPT

## 2018-01-01 PROCEDURE — 99226 PR SUBSEQUENT OBSERVATION CARE,LEVEL III: CPT | Performed by: INTERNAL MEDICINE

## 2018-01-01 PROCEDURE — 96376 TX/PRO/DX INJ SAME DRUG ADON: CPT

## 2018-01-01 PROCEDURE — 700111 HCHG RX REV CODE 636 W/ 250 OVERRIDE (IP): Performed by: HOSPITALIST

## 2018-01-01 PROCEDURE — G8979 MOBILITY GOAL STATUS: HCPCS | Mod: CI

## 2018-01-01 PROCEDURE — 93005 ELECTROCARDIOGRAM TRACING: CPT | Performed by: EMERGENCY MEDICINE

## 2018-01-01 PROCEDURE — 96365 THER/PROPH/DIAG IV INF INIT: CPT

## 2018-01-01 PROCEDURE — 87086 URINE CULTURE/COLONY COUNT: CPT

## 2018-01-01 PROCEDURE — 93010 ELECTROCARDIOGRAM REPORT: CPT | Performed by: INTERNAL MEDICINE

## 2018-01-01 PROCEDURE — 84100 ASSAY OF PHOSPHORUS: CPT

## 2018-01-01 PROCEDURE — 0042T CT-CEREBRAL PERFUSION ANALYSIS: CPT

## 2018-01-01 PROCEDURE — 99285 EMERGENCY DEPT VISIT HI MDM: CPT

## 2018-01-01 PROCEDURE — 83605 ASSAY OF LACTIC ACID: CPT

## 2018-01-01 PROCEDURE — 94618 PULMONARY STRESS TESTING: CPT | Performed by: NURSE PRACTITIONER

## 2018-01-01 PROCEDURE — 99244 OFF/OP CNSLTJ NEW/EST MOD 40: CPT | Performed by: PSYCHIATRY & NEUROLOGY

## 2018-01-01 PROCEDURE — 99225 PR SUBSEQUENT OBSERVATION CARE,LEVEL II: CPT | Performed by: INTERNAL MEDICINE

## 2018-01-01 PROCEDURE — 93005 ELECTROCARDIOGRAM TRACING: CPT | Performed by: FAMILY MEDICINE

## 2018-01-01 PROCEDURE — 96374 THER/PROPH/DIAG INJ IV PUSH: CPT

## 2018-01-01 PROCEDURE — 82550 ASSAY OF CK (CPK): CPT

## 2018-01-01 PROCEDURE — 85025 COMPLETE CBC W/AUTO DIFF WBC: CPT | Mod: 91

## 2018-01-01 PROCEDURE — 70496 CT ANGIOGRAPHY HEAD: CPT

## 2018-01-01 PROCEDURE — 86140 C-REACTIVE PROTEIN: CPT

## 2018-01-01 PROCEDURE — 70498 CT ANGIOGRAPHY NECK: CPT

## 2018-01-01 PROCEDURE — 97161 PT EVAL LOW COMPLEX 20 MIN: CPT

## 2018-01-01 PROCEDURE — A9270 NON-COVERED ITEM OR SERVICE: HCPCS | Performed by: EMERGENCY MEDICINE

## 2018-01-01 PROCEDURE — 99239 HOSP IP/OBS DSCHRG MGMT >30: CPT | Performed by: INTERNAL MEDICINE

## 2018-01-01 PROCEDURE — 85610 PROTHROMBIN TIME: CPT

## 2018-01-01 PROCEDURE — G8989 SELF CARE D/C STATUS: HCPCS | Mod: CI

## 2018-01-01 PROCEDURE — G8987 SELF CARE CURRENT STATUS: HCPCS | Mod: CI

## 2018-01-01 PROCEDURE — 85652 RBC SED RATE AUTOMATED: CPT

## 2018-01-01 PROCEDURE — 770020 HCHG ROOM/CARE - TELE (206)

## 2018-01-01 PROCEDURE — 83735 ASSAY OF MAGNESIUM: CPT

## 2018-01-01 PROCEDURE — G8978 MOBILITY CURRENT STATUS: HCPCS | Mod: CI

## 2018-01-01 PROCEDURE — 84146 ASSAY OF PROLACTIN: CPT

## 2018-01-01 PROCEDURE — 85730 THROMBOPLASTIN TIME PARTIAL: CPT

## 2018-01-01 PROCEDURE — 82553 CREATINE MB FRACTION: CPT

## 2018-01-01 PROCEDURE — 84443 ASSAY THYROID STIM HORMONE: CPT

## 2018-01-01 PROCEDURE — 70450 CT HEAD/BRAIN W/O DYE: CPT

## 2018-01-01 PROCEDURE — 70551 MRI BRAIN STEM W/O DYE: CPT

## 2018-01-01 PROCEDURE — 93306 TTE W/DOPPLER COMPLETE: CPT | Mod: 26 | Performed by: INTERNAL MEDICINE

## 2018-01-01 PROCEDURE — G8988 SELF CARE GOAL STATUS: HCPCS | Mod: CI

## 2018-01-01 PROCEDURE — 95951 EEG: CPT | Mod: 52

## 2018-01-01 RX ORDER — TRAZODONE HYDROCHLORIDE 50 MG/1
50 TABLET ORAL ONCE
Status: COMPLETED | OUTPATIENT
Start: 2018-01-01 | End: 2018-01-01

## 2018-01-01 RX ORDER — LISINOPRIL 5 MG/1
5 TABLET ORAL DAILY
Status: DISCONTINUED | OUTPATIENT
Start: 2018-01-01 | End: 2018-01-01 | Stop reason: HOSPADM

## 2018-01-01 RX ORDER — LORAZEPAM 0.5 MG/1
0.25 TABLET ORAL EVERY 8 HOURS PRN
Status: DISCONTINUED | OUTPATIENT
Start: 2018-01-01 | End: 2018-01-01

## 2018-01-01 RX ORDER — POTASSIUM CHLORIDE 750 MG/1
10 TABLET, EXTENDED RELEASE ORAL DAILY
Qty: 30 TAB | Refills: 11 | Status: SHIPPED | OUTPATIENT
Start: 2018-01-01

## 2018-01-01 RX ORDER — LORAZEPAM 2 MG/ML
1 INJECTION INTRAMUSCULAR ONCE
Status: COMPLETED | OUTPATIENT
Start: 2018-01-01 | End: 2018-01-01

## 2018-01-01 RX ORDER — TRAMADOL HYDROCHLORIDE 50 MG/1
50 TABLET ORAL EVERY 4 HOURS PRN
Status: DISCONTINUED | OUTPATIENT
Start: 2018-01-01 | End: 2018-01-01 | Stop reason: HOSPADM

## 2018-01-01 RX ORDER — FUROSEMIDE 10 MG/ML
20 INJECTION INTRAMUSCULAR; INTRAVENOUS ONCE
Status: COMPLETED | OUTPATIENT
Start: 2018-01-01 | End: 2018-01-01

## 2018-01-01 RX ORDER — POLYETHYLENE GLYCOL 3350 17 G/17G
1 POWDER, FOR SOLUTION ORAL
Status: DISCONTINUED | OUTPATIENT
Start: 2018-01-01 | End: 2018-01-01

## 2018-01-01 RX ORDER — LORAZEPAM 2 MG/ML
1 INJECTION INTRAMUSCULAR
Status: DISCONTINUED | OUTPATIENT
Start: 2018-01-01 | End: 2018-01-01

## 2018-01-01 RX ORDER — LORAZEPAM 0.5 MG/1
0.25 TABLET ORAL EVERY 6 HOURS PRN
Status: DISCONTINUED | OUTPATIENT
Start: 2018-01-01 | End: 2018-01-01

## 2018-01-01 RX ORDER — MORPHINE SULFATE 10 MG/ML
10 INJECTION, SOLUTION INTRAMUSCULAR; INTRAVENOUS
Status: DISCONTINUED | OUTPATIENT
Start: 2018-01-01 | End: 2018-01-01

## 2018-01-01 RX ORDER — SPIRONOLACTONE 25 MG/1
25 TABLET ORAL DAILY
Qty: 30 TAB | Refills: 0 | Status: SHIPPED | OUTPATIENT
Start: 2018-01-01 | End: 2018-01-01 | Stop reason: SDUPTHER

## 2018-01-01 RX ORDER — SPIRONOLACTONE 50 MG/1
25 TABLET, FILM COATED ORAL DAILY
Status: DISCONTINUED | OUTPATIENT
Start: 2018-01-01 | End: 2018-01-01

## 2018-01-01 RX ORDER — ALPRAZOLAM 0.5 MG/1
0.5 TABLET ORAL ONCE
Status: COMPLETED | OUTPATIENT
Start: 2018-01-01 | End: 2018-01-01

## 2018-01-01 RX ORDER — CARVEDILOL 12.5 MG/1
12.5 TABLET ORAL 2 TIMES DAILY WITH MEALS
Status: DISCONTINUED | OUTPATIENT
Start: 2018-01-01 | End: 2018-01-01 | Stop reason: HOSPADM

## 2018-01-01 RX ORDER — FLUOXETINE 10 MG/1
10 CAPSULE ORAL DAILY
Status: DISCONTINUED | OUTPATIENT
Start: 2018-01-01 | End: 2018-01-01

## 2018-01-01 RX ORDER — BISACODYL 10 MG
10 SUPPOSITORY, RECTAL RECTAL
Status: DISCONTINUED | OUTPATIENT
Start: 2018-01-01 | End: 2018-01-01 | Stop reason: HOSPADM

## 2018-01-01 RX ORDER — CLONIDINE HYDROCHLORIDE 0.1 MG/1
0.1 TABLET ORAL EVERY 6 HOURS PRN
Status: DISCONTINUED | OUTPATIENT
Start: 2018-01-01 | End: 2018-01-01 | Stop reason: HOSPADM

## 2018-01-01 RX ORDER — POLYETHYLENE GLYCOL 3350 17 G/17G
1 POWDER, FOR SOLUTION ORAL
Status: DISCONTINUED | OUTPATIENT
Start: 2018-01-01 | End: 2018-01-01 | Stop reason: HOSPADM

## 2018-01-01 RX ORDER — ASPIRIN 300 MG/1
300 SUPPOSITORY RECTAL DAILY
Status: DISCONTINUED | OUTPATIENT
Start: 2018-01-01 | End: 2018-01-01

## 2018-01-01 RX ORDER — LISINOPRIL 5 MG/1
5 TABLET ORAL DAILY
Qty: 30 TAB | Refills: 11 | Status: SHIPPED | OUTPATIENT
Start: 2018-01-01

## 2018-01-01 RX ORDER — BISACODYL 10 MG
10 SUPPOSITORY, RECTAL RECTAL
Status: DISCONTINUED | OUTPATIENT
Start: 2018-01-01 | End: 2018-01-01

## 2018-01-01 RX ORDER — NITROGLYCERIN 0.4 MG/1
0.4 TABLET SUBLINGUAL ONCE
Status: COMPLETED | OUTPATIENT
Start: 2018-01-01 | End: 2018-01-01

## 2018-01-01 RX ORDER — AZITHROMYCIN 500 MG/1
500 INJECTION, POWDER, LYOPHILIZED, FOR SOLUTION INTRAVENOUS ONCE
Status: DISCONTINUED | OUTPATIENT
Start: 2018-01-01 | End: 2018-01-01

## 2018-01-01 RX ORDER — SODIUM CHLORIDE 9 MG/ML
1000 INJECTION, SOLUTION INTRAVENOUS ONCE
Status: COMPLETED | OUTPATIENT
Start: 2018-01-01 | End: 2018-01-01

## 2018-01-01 RX ORDER — SPIRONOLACTONE 25 MG/1
25 TABLET ORAL DAILY
Qty: 30 TAB | Refills: 0 | Status: SHIPPED | OUTPATIENT
Start: 2018-01-01 | End: 2018-01-01

## 2018-01-01 RX ORDER — LORAZEPAM 0.5 MG/1
0.5 TABLET ORAL EVERY 6 HOURS PRN
Status: DISCONTINUED | OUTPATIENT
Start: 2018-01-01 | End: 2018-01-01

## 2018-01-01 RX ORDER — METOCLOPRAMIDE 10 MG/1
5 TABLET ORAL
Status: DISCONTINUED | OUTPATIENT
Start: 2018-01-01 | End: 2018-01-01 | Stop reason: HOSPADM

## 2018-01-01 RX ORDER — ONDANSETRON 4 MG/1
4 TABLET, ORALLY DISINTEGRATING ORAL EVERY 4 HOURS PRN
Status: DISCONTINUED | OUTPATIENT
Start: 2018-01-01 | End: 2018-01-01 | Stop reason: HOSPADM

## 2018-01-01 RX ORDER — FUROSEMIDE 40 MG/1
40 TABLET ORAL DAILY
Qty: 30 TAB | Refills: 11 | Status: SHIPPED | OUTPATIENT
Start: 2018-01-01 | End: 2018-01-01 | Stop reason: SDUPTHER

## 2018-01-01 RX ORDER — SPIRONOLACTONE 25 MG/1
25 TABLET ORAL DAILY
Status: DISCONTINUED | OUTPATIENT
Start: 2018-01-01 | End: 2018-01-01 | Stop reason: HOSPADM

## 2018-01-01 RX ORDER — SPIRONOLACTONE 25 MG/1
25 TABLET ORAL DAILY
Qty: 30 TAB | Refills: 3 | Status: SHIPPED | OUTPATIENT
Start: 2018-01-01 | End: 2018-01-01

## 2018-01-01 RX ORDER — ASPIRIN 81 MG/1
81 TABLET ORAL DAILY
Qty: 30 TAB | Refills: 0 | Status: SHIPPED | OUTPATIENT
Start: 2018-01-01

## 2018-01-01 RX ORDER — CARVEDILOL 6.25 MG/1
12.5 TABLET ORAL 2 TIMES DAILY WITH MEALS
Status: DISCONTINUED | OUTPATIENT
Start: 2018-01-01 | End: 2018-01-01

## 2018-01-01 RX ORDER — SPIRONOLACTONE 25 MG/1
25 TABLET ORAL DAILY
Qty: 30 TAB | Refills: 3 | Status: ON HOLD | OUTPATIENT
Start: 2018-01-01 | End: 2018-01-01

## 2018-01-01 RX ORDER — ASPIRIN 300 MG/1
300 SUPPOSITORY RECTAL ONCE
Status: COMPLETED | OUTPATIENT
Start: 2018-01-01 | End: 2018-01-01

## 2018-01-01 RX ORDER — ASPIRIN 325 MG
325 TABLET ORAL DAILY
Status: DISCONTINUED | OUTPATIENT
Start: 2018-01-01 | End: 2018-01-01

## 2018-01-01 RX ORDER — ASPIRIN 81 MG/1
81 TABLET ORAL DAILY
Qty: 30 TAB | Refills: 0 | Status: SHIPPED | OUTPATIENT
Start: 2018-01-01 | End: 2018-01-01

## 2018-01-01 RX ORDER — LORAZEPAM 0.5 MG/1
0.5 TABLET ORAL EVERY 8 HOURS PRN
Qty: 15 TAB | Refills: 0 | Status: SHIPPED | OUTPATIENT
Start: 2018-01-01 | End: 2018-01-01

## 2018-01-01 RX ORDER — DIGOXIN 0.25 MG/ML
500 INJECTION INTRAMUSCULAR; INTRAVENOUS ONCE
Status: COMPLETED | OUTPATIENT
Start: 2018-01-01 | End: 2018-01-01

## 2018-01-01 RX ORDER — ALPRAZOLAM 0.5 MG/1
0.5 TABLET ORAL 2 TIMES DAILY PRN
Status: DISCONTINUED | OUTPATIENT
Start: 2018-01-01 | End: 2018-01-01

## 2018-01-01 RX ORDER — ASPIRIN 81 MG/1
81 TABLET, CHEWABLE ORAL DAILY
Qty: 100 TAB | Refills: 0 | Status: SHIPPED | OUTPATIENT
Start: 2018-01-01 | End: 2018-01-01

## 2018-01-01 RX ORDER — FUROSEMIDE 10 MG/ML
40 INJECTION INTRAMUSCULAR; INTRAVENOUS
Status: DISCONTINUED | OUTPATIENT
Start: 2018-01-01 | End: 2018-01-01

## 2018-01-01 RX ORDER — PROMETHAZINE HYDROCHLORIDE 25 MG/1
12.5-25 TABLET ORAL EVERY 4 HOURS PRN
Status: DISCONTINUED | OUTPATIENT
Start: 2018-01-01 | End: 2018-01-01 | Stop reason: HOSPADM

## 2018-01-01 RX ORDER — LISINOPRIL 5 MG/1
5 TABLET ORAL DAILY
Qty: 30 TAB | Refills: 0 | Status: SHIPPED | OUTPATIENT
Start: 2018-01-01 | End: 2018-01-01

## 2018-01-01 RX ORDER — TRAZODONE HYDROCHLORIDE 50 MG/1
50 TABLET ORAL
Qty: 30 TAB | Refills: 6 | Status: SHIPPED | OUTPATIENT
Start: 2018-01-01

## 2018-01-01 RX ORDER — ASPIRIN 81 MG/1
324 TABLET, CHEWABLE ORAL ONCE
Status: COMPLETED | OUTPATIENT
Start: 2018-01-01 | End: 2018-01-01

## 2018-01-01 RX ORDER — ASPIRIN 81 MG/1
324 TABLET, CHEWABLE ORAL DAILY
Status: DISCONTINUED | OUTPATIENT
Start: 2018-01-01 | End: 2018-01-01

## 2018-01-01 RX ORDER — ONDANSETRON 2 MG/ML
4 INJECTION INTRAMUSCULAR; INTRAVENOUS EVERY 4 HOURS PRN
Status: DISCONTINUED | OUTPATIENT
Start: 2018-01-01 | End: 2018-01-01 | Stop reason: HOSPADM

## 2018-01-01 RX ORDER — FUROSEMIDE 40 MG/1
40 TABLET ORAL 2 TIMES DAILY
Qty: 60 TAB | Refills: 11 | Status: SHIPPED | OUTPATIENT
Start: 2018-01-01 | End: 2018-01-01 | Stop reason: SDUPTHER

## 2018-01-01 RX ORDER — CARVEDILOL 25 MG/1
25 TABLET ORAL 2 TIMES DAILY WITH MEALS
Qty: 60 TAB | Refills: 11 | Status: SHIPPED | OUTPATIENT
Start: 2018-01-01

## 2018-01-01 RX ORDER — FUROSEMIDE 40 MG/1
40 TABLET ORAL DAILY
Qty: 30 TAB | Refills: 0 | Status: SHIPPED | OUTPATIENT
Start: 2018-01-01 | End: 2018-01-01 | Stop reason: SDUPTHER

## 2018-01-01 RX ORDER — FUROSEMIDE 10 MG/ML
20 INJECTION INTRAMUSCULAR; INTRAVENOUS
Status: DISCONTINUED | OUTPATIENT
Start: 2018-01-01 | End: 2018-01-01

## 2018-01-01 RX ORDER — CARVEDILOL 3.12 MG/1
3.12 TABLET ORAL 2 TIMES DAILY WITH MEALS
Status: DISCONTINUED | OUTPATIENT
Start: 2018-01-01 | End: 2018-01-01

## 2018-01-01 RX ORDER — PROMETHAZINE HYDROCHLORIDE 25 MG/1
12.5-25 SUPPOSITORY RECTAL EVERY 4 HOURS PRN
Status: DISCONTINUED | OUTPATIENT
Start: 2018-01-01 | End: 2018-01-01

## 2018-01-01 RX ORDER — CARVEDILOL 12.5 MG/1
12.5 TABLET ORAL 2 TIMES DAILY WITH MEALS
Qty: 60 TAB | Refills: 0 | Status: ON HOLD | OUTPATIENT
Start: 2018-01-01 | End: 2018-01-01

## 2018-01-01 RX ORDER — PROMETHAZINE HYDROCHLORIDE 25 MG/1
12.5-25 TABLET ORAL EVERY 4 HOURS PRN
Status: DISCONTINUED | OUTPATIENT
Start: 2018-01-01 | End: 2018-01-01

## 2018-01-01 RX ORDER — CARVEDILOL 12.5 MG/1
12.5 TABLET ORAL 2 TIMES DAILY WITH MEALS
Qty: 60 TAB | Refills: 11 | Status: SHIPPED | OUTPATIENT
Start: 2018-01-01 | End: 2018-01-01 | Stop reason: DRUGHIGH

## 2018-01-01 RX ORDER — ONDANSETRON 2 MG/ML
8 INJECTION INTRAMUSCULAR; INTRAVENOUS EVERY 8 HOURS PRN
Status: DISCONTINUED | OUTPATIENT
Start: 2018-01-01 | End: 2018-01-01

## 2018-01-01 RX ORDER — QUETIAPINE FUMARATE 25 MG/1
25 TABLET, FILM COATED ORAL EVERY 8 HOURS
Status: DISCONTINUED | OUTPATIENT
Start: 2018-01-01 | End: 2018-01-01 | Stop reason: HOSPADM

## 2018-01-01 RX ORDER — AMOXICILLIN 250 MG
2 CAPSULE ORAL 2 TIMES DAILY
Status: DISCONTINUED | OUTPATIENT
Start: 2018-01-01 | End: 2018-01-01 | Stop reason: HOSPADM

## 2018-01-01 RX ORDER — PROMETHAZINE HYDROCHLORIDE 12.5 MG/1
12.5-25 SUPPOSITORY RECTAL EVERY 4 HOURS PRN
Status: DISCONTINUED | OUTPATIENT
Start: 2018-01-01 | End: 2018-01-01 | Stop reason: HOSPADM

## 2018-01-01 RX ORDER — ASPIRIN 81 MG/1
81 TABLET, CHEWABLE ORAL DAILY
Status: DISCONTINUED | OUTPATIENT
Start: 2018-01-01 | End: 2018-01-01 | Stop reason: HOSPADM

## 2018-01-01 RX ORDER — ACETAMINOPHEN 325 MG/1
650 TABLET ORAL EVERY 6 HOURS PRN
Status: DISCONTINUED | OUTPATIENT
Start: 2018-01-01 | End: 2018-01-01 | Stop reason: HOSPADM

## 2018-01-01 RX ORDER — ASPIRIN 81 MG/1
81 TABLET, CHEWABLE ORAL DAILY
Qty: 100 TAB | Refills: 0 | Status: ON HOLD | OUTPATIENT
Start: 2018-01-01 | End: 2018-01-01

## 2018-01-01 RX ORDER — ONDANSETRON 4 MG/1
4 TABLET, ORALLY DISINTEGRATING ORAL EVERY 4 HOURS PRN
Status: DISCONTINUED | OUTPATIENT
Start: 2018-01-01 | End: 2018-01-01

## 2018-01-01 RX ORDER — POTASSIUM CHLORIDE 750 MG/1
10 TABLET, EXTENDED RELEASE ORAL DAILY
Qty: 30 TAB | Refills: 0 | Status: SHIPPED | OUTPATIENT
Start: 2018-01-01 | End: 2018-01-01

## 2018-01-01 RX ORDER — FUROSEMIDE 20 MG/1
20 TABLET ORAL
Status: DISCONTINUED | OUTPATIENT
Start: 2018-01-01 | End: 2018-01-01

## 2018-01-01 RX ORDER — HEPARIN SODIUM 5000 [USP'U]/ML
5000 INJECTION, SOLUTION INTRAVENOUS; SUBCUTANEOUS EVERY 8 HOURS
Status: DISCONTINUED | OUTPATIENT
Start: 2018-01-01 | End: 2018-01-01

## 2018-01-01 RX ORDER — SODIUM CHLORIDE 9 MG/ML
INJECTION, SOLUTION INTRAVENOUS CONTINUOUS
Status: DISCONTINUED | OUTPATIENT
Start: 2018-01-01 | End: 2018-01-01

## 2018-01-01 RX ORDER — FUROSEMIDE 10 MG/ML
20 INJECTION INTRAMUSCULAR; INTRAVENOUS
Status: DISCONTINUED | OUTPATIENT
Start: 2018-01-01 | End: 2018-01-01 | Stop reason: HOSPADM

## 2018-01-01 RX ORDER — TRAZODONE HYDROCHLORIDE 50 MG/1
50 TABLET ORAL
Status: DISCONTINUED | OUTPATIENT
Start: 2018-01-01 | End: 2018-01-01 | Stop reason: HOSPADM

## 2018-01-01 RX ORDER — POTASSIUM CHLORIDE 750 MG/1
10 TABLET, EXTENDED RELEASE ORAL DAILY
Qty: 30 TAB | Refills: 0 | Status: SHIPPED | OUTPATIENT
Start: 2018-01-01 | End: 2018-01-01 | Stop reason: SDUPTHER

## 2018-01-01 RX ORDER — AMOXICILLIN 250 MG
2 CAPSULE ORAL 2 TIMES DAILY
Status: DISCONTINUED | OUTPATIENT
Start: 2018-01-01 | End: 2018-01-01

## 2018-01-01 RX ORDER — QUETIAPINE FUMARATE 25 MG/1
25 TABLET, FILM COATED ORAL 2 TIMES DAILY
Status: DISCONTINUED | OUTPATIENT
Start: 2018-01-01 | End: 2018-01-01

## 2018-01-01 RX ORDER — CARVEDILOL 12.5 MG/1
12.5 TABLET ORAL 2 TIMES DAILY WITH MEALS
Qty: 60 TAB | Refills: 0 | Status: SHIPPED | OUTPATIENT
Start: 2018-01-01 | End: 2018-01-01

## 2018-01-01 RX ORDER — QUETIAPINE FUMARATE 25 MG/1
25 TABLET, FILM COATED ORAL EVERY 8 HOURS
Qty: 90 TAB | Refills: 0 | Status: SHIPPED | OUTPATIENT
Start: 2018-01-01 | End: 2018-01-01

## 2018-01-01 RX ORDER — FUROSEMIDE 40 MG/1
40 TABLET ORAL 2 TIMES DAILY
Qty: 180 TAB | Refills: 3 | Status: SHIPPED | OUTPATIENT
Start: 2018-01-01

## 2018-01-01 RX ORDER — LISINOPRIL 5 MG/1
5 TABLET ORAL DAILY
Qty: 30 TAB | Refills: 0 | Status: SHIPPED | OUTPATIENT
Start: 2018-01-01 | End: 2018-01-01 | Stop reason: SDUPTHER

## 2018-01-01 RX ORDER — LORAZEPAM 1 MG/1
0.5 TABLET ORAL EVERY 8 HOURS PRN
Status: DISCONTINUED | OUTPATIENT
Start: 2018-01-01 | End: 2018-01-01 | Stop reason: HOSPADM

## 2018-01-01 RX ORDER — DOCUSATE SODIUM 100 MG/1
100 CAPSULE, LIQUID FILLED ORAL EVERY 12 HOURS
Status: DISCONTINUED | OUTPATIENT
Start: 2018-01-01 | End: 2018-01-01

## 2018-01-01 RX ORDER — LABETALOL HYDROCHLORIDE 5 MG/ML
10 INJECTION, SOLUTION INTRAVENOUS EVERY 4 HOURS PRN
Status: DISCONTINUED | OUTPATIENT
Start: 2018-01-01 | End: 2018-01-01 | Stop reason: HOSPADM

## 2018-01-01 RX ORDER — LISINOPRIL 5 MG/1
5 TABLET ORAL DAILY
Qty: 30 TAB | Refills: 0 | Status: ON HOLD | OUTPATIENT
Start: 2018-01-01 | End: 2018-01-01

## 2018-01-01 RX ORDER — LORAZEPAM 1 MG/1
0.5 TABLET ORAL 3 TIMES DAILY PRN
Status: ON HOLD | COMMUNITY
End: 2018-01-01

## 2018-01-01 RX ORDER — ASPIRIN 81 MG/1
81 TABLET, CHEWABLE ORAL DAILY
Status: DISCONTINUED | OUTPATIENT
Start: 2018-01-01 | End: 2018-01-01

## 2018-01-01 RX ORDER — LORAZEPAM 0.5 MG/1
0.5 TABLET ORAL EVERY 8 HOURS PRN
Status: DISCONTINUED | OUTPATIENT
Start: 2018-01-01 | End: 2018-01-01

## 2018-01-01 RX ORDER — ATROPINE SULFATE 10 MG/ML
2 SOLUTION/ DROPS OPHTHALMIC EVERY 4 HOURS PRN
Status: DISCONTINUED | OUTPATIENT
Start: 2018-01-01 | End: 2018-01-01

## 2018-01-01 RX ORDER — LISINOPRIL 5 MG/1
5 TABLET ORAL
Status: DISCONTINUED | OUTPATIENT
Start: 2018-01-01 | End: 2018-01-01 | Stop reason: HOSPADM

## 2018-01-01 RX ORDER — ALPRAZOLAM 0.5 MG/1
0.5 TABLET ORAL 3 TIMES DAILY PRN
Status: DISCONTINUED | OUTPATIENT
Start: 2018-01-01 | End: 2018-01-01 | Stop reason: HOSPADM

## 2018-01-01 RX ORDER — ALPRAZOLAM 0.5 MG/1
0.5 TABLET ORAL 3 TIMES DAILY PRN
Status: DISCONTINUED | OUTPATIENT
Start: 2018-01-01 | End: 2018-01-01

## 2018-01-01 RX ORDER — SPIRONOLACTONE 25 MG/1
25 TABLET ORAL DAILY
Qty: 30 TAB | Refills: 11 | Status: SHIPPED | OUTPATIENT
Start: 2018-01-01

## 2018-01-01 RX ORDER — FUROSEMIDE 40 MG/1
40 TABLET ORAL
Status: DISCONTINUED | OUTPATIENT
Start: 2018-01-01 | End: 2018-01-01 | Stop reason: HOSPADM

## 2018-01-01 RX ORDER — FUROSEMIDE 40 MG/1
40 TABLET ORAL DAILY
Qty: 30 TAB | Refills: 0 | Status: SHIPPED | OUTPATIENT
Start: 2018-01-01 | End: 2018-01-01

## 2018-01-01 RX ORDER — FUROSEMIDE 40 MG/1
40 TABLET ORAL DAILY
Qty: 30 TAB | Refills: 0 | Status: ON HOLD | OUTPATIENT
Start: 2018-01-01 | End: 2018-01-01

## 2018-01-01 RX ORDER — LORAZEPAM 2 MG/ML
1 CONCENTRATE ORAL
Status: DISCONTINUED | OUTPATIENT
Start: 2018-01-01 | End: 2018-01-01

## 2018-01-01 RX ORDER — CLONAZEPAM 0.5 MG/1
1 TABLET ORAL 2 TIMES DAILY
Status: DISCONTINUED | OUTPATIENT
Start: 2018-01-01 | End: 2018-01-01

## 2018-01-01 RX ORDER — ATORVASTATIN CALCIUM 80 MG/1
80 TABLET, FILM COATED ORAL EVERY EVENING
Status: DISCONTINUED | OUTPATIENT
Start: 2018-01-01 | End: 2018-01-01 | Stop reason: HOSPADM

## 2018-01-01 RX ORDER — DIVALPROEX SODIUM 125 MG/1
125 CAPSULE, COATED PELLETS ORAL EVERY 8 HOURS
Status: DISCONTINUED | OUTPATIENT
Start: 2018-01-01 | End: 2018-01-01

## 2018-01-01 RX ORDER — CARVEDILOL 6.25 MG/1
6.25 TABLET ORAL 2 TIMES DAILY WITH MEALS
Qty: 60 TAB | Refills: 2 | Status: SHIPPED | OUTPATIENT
Start: 2018-01-01 | End: 2018-01-01 | Stop reason: SDUPTHER

## 2018-01-01 RX ORDER — MORPHINE SULFATE 100 MG/5ML
10 SOLUTION ORAL
Status: DISCONTINUED | OUTPATIENT
Start: 2018-01-01 | End: 2018-01-01

## 2018-01-01 RX ORDER — ALBUTEROL SULFATE 90 UG/1
2 AEROSOL, METERED RESPIRATORY (INHALATION)
Status: DISCONTINUED | OUTPATIENT
Start: 2018-01-01 | End: 2018-01-01

## 2018-01-01 RX ORDER — LORAZEPAM 2 MG/ML
1-2 INJECTION INTRAMUSCULAR ONCE
Status: COMPLETED | OUTPATIENT
Start: 2018-01-01 | End: 2018-01-01

## 2018-01-01 RX ORDER — NICOTINE 21 MG/24HR
21 PATCH, TRANSDERMAL 24 HOURS TRANSDERMAL
Status: DISCONTINUED | OUTPATIENT
Start: 2018-01-01 | End: 2018-01-01 | Stop reason: HOSPADM

## 2018-01-01 RX ORDER — NICOTINE 21 MG/24HR
14 PATCH, TRANSDERMAL 24 HOURS TRANSDERMAL
Status: DISCONTINUED | OUTPATIENT
Start: 2018-01-01 | End: 2018-01-01 | Stop reason: HOSPADM

## 2018-01-01 RX ORDER — SPIRONOLACTONE 25 MG/1
25 TABLET ORAL
Status: DISCONTINUED | OUTPATIENT
Start: 2018-01-01 | End: 2018-01-01 | Stop reason: HOSPADM

## 2018-01-01 RX ORDER — ONDANSETRON 2 MG/ML
4 INJECTION INTRAMUSCULAR; INTRAVENOUS EVERY 4 HOURS PRN
Status: DISCONTINUED | OUTPATIENT
Start: 2018-01-01 | End: 2018-01-01

## 2018-01-01 RX ORDER — ONDANSETRON 4 MG/1
8 TABLET, ORALLY DISINTEGRATING ORAL EVERY 8 HOURS PRN
Status: DISCONTINUED | OUTPATIENT
Start: 2018-01-01 | End: 2018-01-01

## 2018-01-01 RX ORDER — DIVALPROEX SODIUM 250 MG/1
250 TABLET, DELAYED RELEASE ORAL 2 TIMES DAILY
Status: DISCONTINUED | OUTPATIENT
Start: 2018-01-01 | End: 2018-01-01 | Stop reason: HOSPADM

## 2018-01-01 RX ORDER — HYDRALAZINE HYDROCHLORIDE 20 MG/ML
10 INJECTION INTRAMUSCULAR; INTRAVENOUS
Status: DISCONTINUED | OUTPATIENT
Start: 2018-01-01 | End: 2018-01-01 | Stop reason: HOSPADM

## 2018-01-01 RX ORDER — ALPRAZOLAM 0.5 MG/1
0.5 TABLET ORAL 3 TIMES DAILY PRN
Qty: 20 TAB | Refills: 0 | Status: SHIPPED | OUTPATIENT
Start: 2018-01-01 | End: 2018-01-01

## 2018-01-01 RX ORDER — QUETIAPINE FUMARATE 25 MG/1
50 TABLET, FILM COATED ORAL ONCE
Status: COMPLETED | OUTPATIENT
Start: 2018-01-01 | End: 2018-01-01

## 2018-01-01 RX ORDER — MORPHINE SULFATE 10 MG/ML
5 INJECTION, SOLUTION INTRAMUSCULAR; INTRAVENOUS
Status: DISCONTINUED | OUTPATIENT
Start: 2018-01-01 | End: 2018-01-01

## 2018-01-01 RX ORDER — LORAZEPAM 0.5 MG/1
0.25 TABLET ORAL ONCE
Status: COMPLETED | OUTPATIENT
Start: 2018-01-01 | End: 2018-01-01

## 2018-01-01 RX ORDER — FUROSEMIDE 10 MG/ML
40 INJECTION INTRAMUSCULAR; INTRAVENOUS ONCE
Status: COMPLETED | OUTPATIENT
Start: 2018-01-01 | End: 2018-01-01

## 2018-01-01 RX ORDER — PROMETHAZINE HYDROCHLORIDE 25 MG/1
12.5-25 SUPPOSITORY RECTAL EVERY 4 HOURS PRN
Status: DISCONTINUED | OUTPATIENT
Start: 2018-01-01 | End: 2018-01-01 | Stop reason: HOSPADM

## 2018-01-01 RX ORDER — CLONAZEPAM 0.5 MG/1
0.5 TABLET ORAL 2 TIMES DAILY PRN
Status: DISCONTINUED | OUTPATIENT
Start: 2018-01-01 | End: 2018-01-01 | Stop reason: HOSPADM

## 2018-01-01 RX ORDER — RISPERIDONE 1 MG/1
1 TABLET ORAL 2 TIMES DAILY
Status: DISCONTINUED | OUTPATIENT
Start: 2018-01-01 | End: 2018-01-01 | Stop reason: HOSPADM

## 2018-01-01 RX ORDER — FLUOXETINE HYDROCHLORIDE 20 MG/1
20 CAPSULE ORAL DAILY
Status: DISCONTINUED | OUTPATIENT
Start: 2018-01-01 | End: 2018-01-01

## 2018-01-01 RX ORDER — LISINOPRIL 10 MG/1
5 TABLET ORAL DAILY
Status: DISCONTINUED | OUTPATIENT
Start: 2018-01-01 | End: 2018-01-01

## 2018-01-01 RX ORDER — CLONAZEPAM 0.5 MG/1
1 TABLET ORAL 3 TIMES DAILY
Status: DISCONTINUED | OUTPATIENT
Start: 2018-01-01 | End: 2018-01-01

## 2018-01-01 RX ORDER — HYDROXYZINE HYDROCHLORIDE 25 MG/1
25 TABLET, FILM COATED ORAL 3 TIMES DAILY PRN
Qty: 30 TAB | Refills: 0 | Status: SHIPPED | OUTPATIENT
Start: 2018-01-01 | End: 2018-01-01

## 2018-01-01 RX ADMIN — LORAZEPAM 0.25 MG: 0.5 TABLET ORAL at 04:08

## 2018-01-01 RX ADMIN — QUETIAPINE FUMARATE 25 MG: 25 TABLET ORAL at 03:16

## 2018-01-01 RX ADMIN — PROMETHAZINE HYDROCHLORIDE 25 MG: 25 TABLET ORAL at 05:25

## 2018-01-01 RX ADMIN — LISINOPRIL 5 MG: 5 TABLET ORAL at 05:23

## 2018-01-01 RX ADMIN — NICOTINE POLACRILEX 2 MG: 2 GUM, CHEWING BUCCAL at 05:39

## 2018-01-01 RX ADMIN — ASPIRIN 81 MG: 81 TABLET, CHEWABLE ORAL at 05:51

## 2018-01-01 RX ADMIN — SPIRONOLACTONE 25 MG: 25 TABLET, FILM COATED ORAL at 05:25

## 2018-01-01 RX ADMIN — CARVEDILOL 3.12 MG: 3.12 TABLET, FILM COATED ORAL at 09:02

## 2018-01-01 RX ADMIN — DIVALPROEX SODIUM 125 MG: 125 CAPSULE, COATED PELLETS ORAL at 10:02

## 2018-01-01 RX ADMIN — CARVEDILOL 3.12 MG: 3.12 TABLET, FILM COATED ORAL at 17:59

## 2018-01-01 RX ADMIN — LORAZEPAM 0.5 MG: 1 TABLET ORAL at 05:10

## 2018-01-01 RX ADMIN — CLONAZEPAM 0.5 MG: 0.5 TABLET ORAL at 21:42

## 2018-01-01 RX ADMIN — ASPIRIN 81 MG: 81 TABLET, COATED ORAL at 04:02

## 2018-01-01 RX ADMIN — QUETIAPINE FUMARATE 25 MG: 25 TABLET ORAL at 10:15

## 2018-01-01 RX ADMIN — CARVEDILOL 3.12 MG: 3.12 TABLET, FILM COATED ORAL at 17:13

## 2018-01-01 RX ADMIN — ALPRAZOLAM 0.5 MG: 0.5 TABLET ORAL at 05:35

## 2018-01-01 RX ADMIN — BENZOCAINE AND MENTHOL 1 LOZENGE: 15; 3.6 LOZENGE ORAL at 01:33

## 2018-01-01 RX ADMIN — SPIRONOLACTONE 25 MG: 25 TABLET, FILM COATED ORAL at 04:53

## 2018-01-01 RX ADMIN — ATORVASTATIN CALCIUM 80 MG: 80 TABLET, FILM COATED ORAL at 17:56

## 2018-01-01 RX ADMIN — ASPIRIN 81 MG: 81 TABLET, COATED ORAL at 05:32

## 2018-01-01 RX ADMIN — TRAMADOL HYDROCHLORIDE 50 MG: 50 TABLET, COATED ORAL at 20:24

## 2018-01-01 RX ADMIN — ACETAMINOPHEN 650 MG: 325 TABLET, FILM COATED ORAL at 20:50

## 2018-01-01 RX ADMIN — CARVEDILOL 3.12 MG: 3.12 TABLET, FILM COATED ORAL at 09:04

## 2018-01-01 RX ADMIN — BENZOCAINE AND MENTHOL 1 LOZENGE: 15; 3.6 LOZENGE ORAL at 23:06

## 2018-01-01 RX ADMIN — SPIRONOLACTONE 25 MG: 25 TABLET, FILM COATED ORAL at 09:03

## 2018-01-01 RX ADMIN — QUETIAPINE FUMARATE 25 MG: 25 TABLET ORAL at 20:30

## 2018-01-01 RX ADMIN — TRAMADOL HYDROCHLORIDE 50 MG: 50 TABLET, COATED ORAL at 21:55

## 2018-01-01 RX ADMIN — Medication 2 TABLET: at 04:04

## 2018-01-01 RX ADMIN — MORPHINE SULFATE 5 MG: 10 INJECTION INTRAVENOUS at 03:36

## 2018-01-01 RX ADMIN — FUROSEMIDE 20 MG: 20 TABLET ORAL at 05:38

## 2018-01-01 RX ADMIN — QUETIAPINE FUMARATE 25 MG: 25 TABLET ORAL at 11:26

## 2018-01-01 RX ADMIN — QUETIAPINE FUMARATE 25 MG: 25 TABLET ORAL at 12:25

## 2018-01-01 RX ADMIN — FUROSEMIDE 20 MG: 20 TABLET ORAL at 13:45

## 2018-01-01 RX ADMIN — METOCLOPRAMIDE 5 MG: 5 TABLET ORAL at 19:17

## 2018-01-01 RX ADMIN — LORAZEPAM 0.25 MG: 0.5 TABLET ORAL at 05:24

## 2018-01-01 RX ADMIN — ASPIRIN 81 MG: 81 TABLET, COATED ORAL at 05:34

## 2018-01-01 RX ADMIN — LORAZEPAM 0.25 MG: 0.5 TABLET ORAL at 20:23

## 2018-01-01 RX ADMIN — MORPHINE SULFATE 10 MG: 10 INJECTION INTRAVENOUS at 16:40

## 2018-01-01 RX ADMIN — BENZOCAINE AND MENTHOL 1 LOZENGE: 15; 3.6 LOZENGE ORAL at 03:15

## 2018-01-01 RX ADMIN — LISINOPRIL 5 MG: 5 TABLET ORAL at 06:29

## 2018-01-01 RX ADMIN — ASPIRIN 81 MG: 81 TABLET, COATED ORAL at 04:53

## 2018-01-01 RX ADMIN — HEPARIN SODIUM 5000 UNITS: 5000 INJECTION, SOLUTION INTRAVENOUS; SUBCUTANEOUS at 13:25

## 2018-01-01 RX ADMIN — LISINOPRIL 5 MG: 5 TABLET ORAL at 05:26

## 2018-01-01 RX ADMIN — ENOXAPARIN SODIUM 40 MG: 100 INJECTION SUBCUTANEOUS at 06:28

## 2018-01-01 RX ADMIN — TRAMADOL HYDROCHLORIDE 50 MG: 50 TABLET, COATED ORAL at 22:01

## 2018-01-01 RX ADMIN — ASPIRIN 81 MG: 81 TABLET, CHEWABLE ORAL at 09:19

## 2018-01-01 RX ADMIN — FUROSEMIDE 20 MG: 20 TABLET ORAL at 04:53

## 2018-01-01 RX ADMIN — QUETIAPINE FUMARATE 25 MG: 25 TABLET ORAL at 14:13

## 2018-01-01 RX ADMIN — TRAMADOL HYDROCHLORIDE 50 MG: 50 TABLET, COATED ORAL at 16:35

## 2018-01-01 RX ADMIN — FUROSEMIDE 40 MG: 10 INJECTION, SOLUTION INTRAMUSCULAR; INTRAVENOUS at 02:46

## 2018-01-01 RX ADMIN — Medication 2 TABLET: at 04:02

## 2018-01-01 RX ADMIN — ONDANSETRON 4 MG: 4 TABLET, ORALLY DISINTEGRATING ORAL at 18:06

## 2018-01-01 RX ADMIN — ONDANSETRON 4 MG: 4 TABLET, ORALLY DISINTEGRATING ORAL at 09:49

## 2018-01-01 RX ADMIN — FUROSEMIDE 20 MG: 20 TABLET ORAL at 06:06

## 2018-01-01 RX ADMIN — QUETIAPINE FUMARATE 25 MG: 25 TABLET ORAL at 02:30

## 2018-01-01 RX ADMIN — FUROSEMIDE 20 MG: 20 TABLET ORAL at 10:02

## 2018-01-01 RX ADMIN — FUROSEMIDE 20 MG: 10 INJECTION, SOLUTION INTRAMUSCULAR; INTRAVENOUS at 17:28

## 2018-01-01 RX ADMIN — LORAZEPAM 0.5 MG: 0.5 TABLET ORAL at 11:00

## 2018-01-01 RX ADMIN — MORPHINE SULFATE 5 MG: 10 INJECTION INTRAVENOUS at 19:46

## 2018-01-01 RX ADMIN — ENOXAPARIN SODIUM 40 MG: 100 INJECTION SUBCUTANEOUS at 05:23

## 2018-01-01 RX ADMIN — LISINOPRIL 5 MG: 5 TABLET ORAL at 04:03

## 2018-01-01 RX ADMIN — FUROSEMIDE 20 MG: 20 TABLET ORAL at 05:23

## 2018-01-01 RX ADMIN — QUETIAPINE FUMARATE 25 MG: 25 TABLET ORAL at 05:37

## 2018-01-01 RX ADMIN — SPIRONOLACTONE 25 MG: 25 TABLET, FILM COATED ORAL at 04:03

## 2018-01-01 RX ADMIN — ONDANSETRON 4 MG: 2 INJECTION, SOLUTION INTRAMUSCULAR; INTRAVENOUS at 05:00

## 2018-01-01 RX ADMIN — LORAZEPAM 0.25 MG: 0.5 TABLET ORAL at 14:06

## 2018-01-01 RX ADMIN — ASPIRIN 81 MG: 81 TABLET, COATED ORAL at 05:24

## 2018-01-01 RX ADMIN — ASPIRIN 81 MG: 81 TABLET, COATED ORAL at 13:45

## 2018-01-01 RX ADMIN — BENZOCAINE AND MENTHOL 1 LOZENGE: 15; 3.6 LOZENGE ORAL at 23:47

## 2018-01-01 RX ADMIN — QUETIAPINE FUMARATE 50 MG: 25 TABLET ORAL at 22:53

## 2018-01-01 RX ADMIN — FUROSEMIDE 20 MG: 20 TABLET ORAL at 05:32

## 2018-01-01 RX ADMIN — CLONAZEPAM 0.5 MG: 0.5 TABLET ORAL at 13:22

## 2018-01-01 RX ADMIN — LISINOPRIL 5 MG: 5 TABLET ORAL at 09:04

## 2018-01-01 RX ADMIN — SPIRONOLACTONE 25 MG: 25 TABLET, FILM COATED ORAL at 06:30

## 2018-01-01 RX ADMIN — LISINOPRIL 5 MG: 5 TABLET ORAL at 05:55

## 2018-01-01 RX ADMIN — CARVEDILOL 3.12 MG: 3.12 TABLET, FILM COATED ORAL at 18:09

## 2018-01-01 RX ADMIN — TRAMADOL HYDROCHLORIDE 50 MG: 50 TABLET, COATED ORAL at 10:09

## 2018-01-01 RX ADMIN — TRAMADOL HYDROCHLORIDE 50 MG: 50 TABLET, COATED ORAL at 10:37

## 2018-01-01 RX ADMIN — FUROSEMIDE 20 MG: 10 INJECTION, SOLUTION INTRAMUSCULAR; INTRAVENOUS at 05:26

## 2018-01-01 RX ADMIN — SODIUM CHLORIDE 1000 ML: 9 INJECTION, SOLUTION INTRAVENOUS at 00:13

## 2018-01-01 RX ADMIN — ALPRAZOLAM 0.5 MG: 0.5 TABLET ORAL at 03:31

## 2018-01-01 RX ADMIN — NICOTINE 21 MG: 21 PATCH, EXTENDED RELEASE TRANSDERMAL at 05:39

## 2018-01-01 RX ADMIN — ACETAMINOPHEN 650 MG: 325 TABLET, FILM COATED ORAL at 20:53

## 2018-01-01 RX ADMIN — Medication 2 TABLET: at 17:54

## 2018-01-01 RX ADMIN — Medication 2 TABLET: at 17:16

## 2018-01-01 RX ADMIN — CARVEDILOL 12.5 MG: 6.25 TABLET, FILM COATED ORAL at 07:56

## 2018-01-01 RX ADMIN — LISINOPRIL 5 MG: 5 TABLET ORAL at 05:11

## 2018-01-01 RX ADMIN — Medication 2 TABLET: at 18:10

## 2018-01-01 RX ADMIN — FUROSEMIDE 20 MG: 10 INJECTION, SOLUTION INTRAMUSCULAR; INTRAVENOUS at 00:54

## 2018-01-01 RX ADMIN — SODIUM CHLORIDE 3 G: 900 INJECTION INTRAVENOUS at 05:20

## 2018-01-01 RX ADMIN — CARVEDILOL 3.12 MG: 3.12 TABLET, FILM COATED ORAL at 09:56

## 2018-01-01 RX ADMIN — SPIRONOLACTONE 25 MG: 25 TABLET, FILM COATED ORAL at 05:35

## 2018-01-01 RX ADMIN — TRAMADOL HYDROCHLORIDE 50 MG: 50 TABLET, COATED ORAL at 01:09

## 2018-01-01 RX ADMIN — ASPIRIN 81 MG: 81 TABLET, COATED ORAL at 06:29

## 2018-01-01 RX ADMIN — LORAZEPAM 0.5 MG: 1 TABLET ORAL at 12:09

## 2018-01-01 RX ADMIN — ASPIRIN 81 MG: 81 TABLET, COATED ORAL at 04:04

## 2018-01-01 RX ADMIN — LORAZEPAM 0.5 MG: 0.5 TABLET ORAL at 04:53

## 2018-01-01 RX ADMIN — LORAZEPAM 0.5 MG: 1 TABLET ORAL at 12:22

## 2018-01-01 RX ADMIN — TRAMADOL HYDROCHLORIDE 50 MG: 50 TABLET, COATED ORAL at 02:17

## 2018-01-01 RX ADMIN — LORAZEPAM 1 MG: 2 INJECTION INTRAMUSCULAR; INTRAVENOUS at 01:41

## 2018-01-01 RX ADMIN — TRAMADOL HYDROCHLORIDE 50 MG: 50 TABLET, COATED ORAL at 23:10

## 2018-01-01 RX ADMIN — BENZOCAINE AND MENTHOL 1 LOZENGE: 15; 3.6 LOZENGE ORAL at 09:05

## 2018-01-01 RX ADMIN — ASPIRIN 81 MG: 81 TABLET, COATED ORAL at 05:55

## 2018-01-01 RX ADMIN — ONDANSETRON HYDROCHLORIDE 4 MG: 2 INJECTION, SOLUTION INTRAMUSCULAR; INTRAVENOUS at 20:16

## 2018-01-01 RX ADMIN — ONDANSETRON 4 MG: 4 TABLET, ORALLY DISINTEGRATING ORAL at 05:39

## 2018-01-01 RX ADMIN — QUETIAPINE FUMARATE 25 MG: 25 TABLET ORAL at 10:52

## 2018-01-01 RX ADMIN — SODIUM CHLORIDE 1000 ML: 9 INJECTION, SOLUTION INTRAVENOUS at 16:06

## 2018-01-01 RX ADMIN — SPIRONOLACTONE 25 MG: 25 TABLET ORAL at 05:39

## 2018-01-01 RX ADMIN — LORAZEPAM 0.25 MG: 0.5 TABLET ORAL at 05:35

## 2018-01-01 RX ADMIN — SPIRONOLACTONE 25 MG: 25 TABLET, FILM COATED ORAL at 05:22

## 2018-01-01 RX ADMIN — LORAZEPAM 0.5 MG: 1 TABLET ORAL at 20:27

## 2018-01-01 RX ADMIN — FUROSEMIDE 20 MG: 20 TABLET ORAL at 05:25

## 2018-01-01 RX ADMIN — SPIRONOLACTONE 25 MG: 25 TABLET, FILM COATED ORAL at 05:11

## 2018-01-01 RX ADMIN — SPIRONOLACTONE 25 MG: 25 TABLET, FILM COATED ORAL at 05:38

## 2018-01-01 RX ADMIN — CARVEDILOL 3.12 MG: 3.12 TABLET, FILM COATED ORAL at 18:53

## 2018-01-01 RX ADMIN — SPIRONOLACTONE 25 MG: 25 TABLET, FILM COATED ORAL at 06:06

## 2018-01-01 RX ADMIN — QUETIAPINE FUMARATE 25 MG: 25 TABLET ORAL at 12:11

## 2018-01-01 RX ADMIN — FUROSEMIDE 20 MG: 10 INJECTION, SOLUTION INTRAMUSCULAR; INTRAVENOUS at 05:35

## 2018-01-01 RX ADMIN — Medication 2 TABLET: at 05:38

## 2018-01-01 RX ADMIN — TRAMADOL HYDROCHLORIDE 50 MG: 50 TABLET, COATED ORAL at 05:55

## 2018-01-01 RX ADMIN — ONDANSETRON 4 MG: 4 TABLET, ORALLY DISINTEGRATING ORAL at 06:16

## 2018-01-01 RX ADMIN — LORAZEPAM 0.25 MG: 0.5 TABLET ORAL at 20:02

## 2018-01-01 RX ADMIN — LORAZEPAM 2 MG: 2 INJECTION INTRAMUSCULAR; INTRAVENOUS at 16:50

## 2018-01-01 RX ADMIN — SPIRONOLACTONE 25 MG: 25 TABLET ORAL at 10:12

## 2018-01-01 RX ADMIN — TRAMADOL HYDROCHLORIDE 50 MG: 50 TABLET, COATED ORAL at 15:51

## 2018-01-01 RX ADMIN — ONDANSETRON 4 MG: 4 TABLET, ORALLY DISINTEGRATING ORAL at 16:35

## 2018-01-01 RX ADMIN — MORPHINE SULFATE 10 MG: 10 INJECTION INTRAVENOUS at 05:36

## 2018-01-01 RX ADMIN — CARVEDILOL 12.5 MG: 12.5 TABLET, FILM COATED ORAL at 17:56

## 2018-01-01 RX ADMIN — FUROSEMIDE 20 MG: 10 INJECTION, SOLUTION INTRAMUSCULAR; INTRAVENOUS at 17:09

## 2018-01-01 RX ADMIN — QUETIAPINE FUMARATE 25 MG: 25 TABLET ORAL at 06:06

## 2018-01-01 RX ADMIN — BENZOCAINE AND MENTHOL 1 LOZENGE: 15; 3.6 LOZENGE ORAL at 02:24

## 2018-01-01 RX ADMIN — LISINOPRIL 5 MG: 5 TABLET ORAL at 04:02

## 2018-01-01 RX ADMIN — LISINOPRIL 5 MG: 5 TABLET ORAL at 05:34

## 2018-01-01 RX ADMIN — FUROSEMIDE 20 MG: 20 TABLET ORAL at 05:35

## 2018-01-01 RX ADMIN — CARVEDILOL 12.5 MG: 12.5 TABLET, FILM COATED ORAL at 10:12

## 2018-01-01 RX ADMIN — QUETIAPINE FUMARATE 25 MG: 25 TABLET ORAL at 17:19

## 2018-01-01 RX ADMIN — CARVEDILOL 3.12 MG: 3.12 TABLET, FILM COATED ORAL at 17:48

## 2018-01-01 RX ADMIN — LISINOPRIL 5 MG: 5 TABLET ORAL at 04:53

## 2018-01-01 RX ADMIN — CARVEDILOL 3.12 MG: 3.12 TABLET, FILM COATED ORAL at 07:26

## 2018-01-01 RX ADMIN — LORAZEPAM 0.25 MG: 0.5 TABLET ORAL at 21:48

## 2018-01-01 RX ADMIN — Medication 2 TABLET: at 18:23

## 2018-01-01 RX ADMIN — LORAZEPAM 0.25 MG: 0.5 TABLET ORAL at 16:01

## 2018-01-01 RX ADMIN — CARVEDILOL 3.12 MG: 3.12 TABLET, FILM COATED ORAL at 10:24

## 2018-01-01 RX ADMIN — LORAZEPAM 0.25 MG: 0.5 TABLET ORAL at 12:15

## 2018-01-01 RX ADMIN — CARVEDILOL 3.12 MG: 3.12 TABLET, FILM COATED ORAL at 17:19

## 2018-01-01 RX ADMIN — ASPIRIN 81 MG: 81 TABLET, COATED ORAL at 05:37

## 2018-01-01 RX ADMIN — PROMETHAZINE HYDROCHLORIDE 25 MG: 25 TABLET ORAL at 19:19

## 2018-01-01 RX ADMIN — QUETIAPINE FUMARATE 25 MG: 25 TABLET ORAL at 17:14

## 2018-01-01 RX ADMIN — IOHEXOL 120 ML: 350 INJECTION, SOLUTION INTRAVENOUS at 19:46

## 2018-01-01 RX ADMIN — SPIRONOLACTONE 25 MG: 25 TABLET, FILM COATED ORAL at 05:56

## 2018-01-01 RX ADMIN — ASPIRIN 324 MG: 81 TABLET, CHEWABLE ORAL at 00:20

## 2018-01-01 RX ADMIN — Medication 2 TABLET: at 16:56

## 2018-01-01 RX ADMIN — Medication 2 TABLET: at 06:27

## 2018-01-01 RX ADMIN — NITROGLYCERIN 1 INCH: 20 OINTMENT TOPICAL at 03:07

## 2018-01-01 RX ADMIN — LORAZEPAM 1 MG: 2 INJECTION INTRAMUSCULAR; INTRAVENOUS at 13:21

## 2018-01-01 RX ADMIN — QUETIAPINE FUMARATE 25 MG: 25 TABLET ORAL at 01:11

## 2018-01-01 RX ADMIN — ASPIRIN 81 MG: 81 TABLET, COATED ORAL at 05:10

## 2018-01-01 RX ADMIN — ASPIRIN 325 MG: 325 TABLET ORAL at 05:26

## 2018-01-01 RX ADMIN — TRAMADOL HYDROCHLORIDE 50 MG: 50 TABLET, COATED ORAL at 20:06

## 2018-01-01 RX ADMIN — DIGOXIN 500 MCG: 0.25 INJECTION INTRAMUSCULAR; INTRAVENOUS at 16:13

## 2018-01-01 RX ADMIN — LORAZEPAM 0.25 MG: 0.5 TABLET ORAL at 13:33

## 2018-01-01 RX ADMIN — ALPRAZOLAM 0.5 MG: 0.5 TABLET ORAL at 17:05

## 2018-01-01 RX ADMIN — QUETIAPINE FUMARATE 25 MG: 25 TABLET ORAL at 11:10

## 2018-01-01 RX ADMIN — CARVEDILOL 3.12 MG: 3.12 TABLET, FILM COATED ORAL at 18:39

## 2018-01-01 RX ADMIN — LORAZEPAM 0.5 MG: 1 TABLET ORAL at 21:03

## 2018-01-01 RX ADMIN — TRAZODONE HYDROCHLORIDE 50 MG: 50 TABLET ORAL at 00:14

## 2018-01-01 RX ADMIN — MORPHINE SULFATE 10 MG: 10 INJECTION INTRAVENOUS at 01:24

## 2018-01-01 RX ADMIN — LORAZEPAM 0.25 MG: 0.5 TABLET ORAL at 10:58

## 2018-01-01 RX ADMIN — LISINOPRIL 5 MG: 5 TABLET ORAL at 13:46

## 2018-01-01 RX ADMIN — ALPRAZOLAM 0.5 MG: 0.5 TABLET ORAL at 11:12

## 2018-01-01 RX ADMIN — ASPIRIN 81 MG: 81 TABLET, COATED ORAL at 05:23

## 2018-01-01 RX ADMIN — ALPRAZOLAM 0.5 MG: 0.5 TABLET ORAL at 07:48

## 2018-01-01 RX ADMIN — FUROSEMIDE 40 MG: 40 TABLET ORAL at 05:40

## 2018-01-01 RX ADMIN — ASPIRIN 81 MG: 81 TABLET, CHEWABLE ORAL at 05:39

## 2018-01-01 RX ADMIN — CARVEDILOL 12.5 MG: 12.5 TABLET, FILM COATED ORAL at 07:30

## 2018-01-01 RX ADMIN — ASPIRIN 81 MG: 81 TABLET, CHEWABLE ORAL at 06:10

## 2018-01-01 RX ADMIN — FUROSEMIDE 40 MG: 40 TABLET ORAL at 06:29

## 2018-01-01 RX ADMIN — ONDANSETRON 4 MG: 4 TABLET, ORALLY DISINTEGRATING ORAL at 17:54

## 2018-01-01 RX ADMIN — PROMETHAZINE HYDROCHLORIDE 25 MG: 25 TABLET ORAL at 14:05

## 2018-01-01 RX ADMIN — ALPRAZOLAM 0.5 MG: 0.5 TABLET ORAL at 13:23

## 2018-01-01 RX ADMIN — ASPIRIN 81 MG: 81 TABLET, COATED ORAL at 06:06

## 2018-01-01 RX ADMIN — LISINOPRIL 5 MG: 5 TABLET ORAL at 05:32

## 2018-01-01 RX ADMIN — CARVEDILOL 3.12 MG: 3.12 TABLET, FILM COATED ORAL at 10:35

## 2018-01-01 RX ADMIN — ALPRAZOLAM 0.5 MG: 0.5 TABLET ORAL at 00:31

## 2018-01-01 RX ADMIN — QUETIAPINE FUMARATE 25 MG: 25 TABLET ORAL at 18:53

## 2018-01-01 RX ADMIN — TRAMADOL HYDROCHLORIDE 50 MG: 50 TABLET, COATED ORAL at 21:35

## 2018-01-01 RX ADMIN — ASPIRIN 325 MG: 325 TABLET ORAL at 05:35

## 2018-01-01 RX ADMIN — QUETIAPINE FUMARATE 25 MG: 25 TABLET ORAL at 18:10

## 2018-01-01 RX ADMIN — ENOXAPARIN SODIUM 40 MG: 100 INJECTION SUBCUTANEOUS at 05:11

## 2018-01-01 RX ADMIN — QUETIAPINE FUMARATE 25 MG: 25 TABLET ORAL at 18:13

## 2018-01-01 RX ADMIN — QUETIAPINE FUMARATE 25 MG: 25 TABLET ORAL at 18:23

## 2018-01-01 RX ADMIN — TRAMADOL HYDROCHLORIDE 50 MG: 50 TABLET, COATED ORAL at 15:59

## 2018-01-01 RX ADMIN — SPIRONOLACTONE 25 MG: 25 TABLET, FILM COATED ORAL at 06:26

## 2018-01-01 RX ADMIN — Medication 2 TABLET: at 17:48

## 2018-01-01 RX ADMIN — QUETIAPINE FUMARATE 25 MG: 25 TABLET ORAL at 14:06

## 2018-01-01 RX ADMIN — ATORVASTATIN CALCIUM 80 MG: 80 TABLET, FILM COATED ORAL at 17:28

## 2018-01-01 RX ADMIN — ENOXAPARIN SODIUM 40 MG: 100 INJECTION SUBCUTANEOUS at 06:31

## 2018-01-01 RX ADMIN — LORAZEPAM 1 MG: 2 INJECTION INTRAMUSCULAR; INTRAVENOUS at 00:13

## 2018-01-01 RX ADMIN — NITROGLYCERIN 0.4 MG: 0.4 TABLET SUBLINGUAL at 03:15

## 2018-01-01 RX ADMIN — CARVEDILOL 3.12 MG: 3.12 TABLET, FILM COATED ORAL at 18:23

## 2018-01-01 RX ADMIN — FUROSEMIDE 20 MG: 20 TABLET ORAL at 06:26

## 2018-01-01 RX ADMIN — CLONAZEPAM 1 MG: 0.5 TABLET ORAL at 06:17

## 2018-01-01 RX ADMIN — ONDANSETRON 4 MG: 4 TABLET, ORALLY DISINTEGRATING ORAL at 10:37

## 2018-01-01 RX ADMIN — QUETIAPINE FUMARATE 25 MG: 25 TABLET ORAL at 04:02

## 2018-01-01 RX ADMIN — CLONAZEPAM 1 MG: 0.5 TABLET ORAL at 13:25

## 2018-01-01 RX ADMIN — TRAMADOL HYDROCHLORIDE 50 MG: 50 TABLET, COATED ORAL at 16:56

## 2018-01-01 RX ADMIN — FUROSEMIDE 20 MG: 10 INJECTION, SOLUTION INTRAMUSCULAR; INTRAVENOUS at 06:31

## 2018-01-01 RX ADMIN — ATORVASTATIN CALCIUM 80 MG: 80 TABLET, FILM COATED ORAL at 21:48

## 2018-01-01 RX ADMIN — FUROSEMIDE 20 MG: 20 TABLET ORAL at 05:55

## 2018-01-01 RX ADMIN — QUETIAPINE FUMARATE 25 MG: 25 TABLET ORAL at 04:53

## 2018-01-01 RX ADMIN — TRAZODONE HYDROCHLORIDE 50 MG: 50 TABLET ORAL at 21:52

## 2018-01-01 RX ADMIN — TRAMADOL HYDROCHLORIDE 50 MG: 50 TABLET, COATED ORAL at 02:19

## 2018-01-01 RX ADMIN — SPIRONOLACTONE 25 MG: 50 TABLET, FILM COATED ORAL at 06:11

## 2018-01-01 RX ADMIN — FUROSEMIDE 20 MG: 20 TABLET ORAL at 04:04

## 2018-01-01 RX ADMIN — LORAZEPAM 0.25 MG: 0.5 TABLET ORAL at 21:55

## 2018-01-01 RX ADMIN — QUETIAPINE FUMARATE 25 MG: 25 TABLET ORAL at 20:26

## 2018-01-01 RX ADMIN — RISPERIDONE 1 MG: 1 TABLET, FILM COATED ORAL at 05:39

## 2018-01-01 RX ADMIN — CARVEDILOL 3.12 MG: 3.12 TABLET, FILM COATED ORAL at 17:54

## 2018-01-01 RX ADMIN — LORAZEPAM 0.25 MG: 0.5 TABLET ORAL at 21:08

## 2018-01-01 RX ADMIN — CARVEDILOL 3.12 MG: 3.12 TABLET, FILM COATED ORAL at 05:23

## 2018-01-01 RX ADMIN — ASPIRIN 81 MG: 81 TABLET, COATED ORAL at 06:26

## 2018-01-01 RX ADMIN — ALPRAZOLAM 0.5 MG: 0.5 TABLET ORAL at 09:08

## 2018-01-01 RX ADMIN — ASPIRIN 81 MG: 81 TABLET, COATED ORAL at 09:02

## 2018-01-01 RX ADMIN — LISINOPRIL 5 MG: 10 TABLET ORAL at 06:10

## 2018-01-01 RX ADMIN — LORAZEPAM 0.25 MG: 0.5 TABLET ORAL at 23:09

## 2018-01-01 RX ADMIN — TRAMADOL HYDROCHLORIDE 50 MG: 50 TABLET, COATED ORAL at 05:41

## 2018-01-01 RX ADMIN — AZITHROMYCIN FOR INJECTION INJECTION, POWDER, LYOPHILIZED, FOR SOLUTION 500 MG: 500 INJECTION INTRAVENOUS at 07:00

## 2018-01-01 RX ADMIN — DIVALPROEX SODIUM 250 MG: 250 TABLET, DELAYED RELEASE ORAL at 05:39

## 2018-01-01 RX ADMIN — DIVALPROEX SODIUM 250 MG: 250 TABLET, DELAYED RELEASE ORAL at 17:59

## 2018-01-01 RX ADMIN — LORAZEPAM 1 MG: 2 INJECTION INTRAMUSCULAR; INTRAVENOUS at 06:37

## 2018-01-01 RX ADMIN — Medication 2 TABLET: at 09:02

## 2018-01-01 RX ADMIN — BENZOCAINE AND MENTHOL 1 LOZENGE: 15; 3.6 LOZENGE ORAL at 11:31

## 2018-01-01 RX ADMIN — Medication 2 TABLET: at 05:22

## 2018-01-01 RX ADMIN — LORAZEPAM 0.25 MG: 0.5 TABLET ORAL at 03:44

## 2018-01-01 RX ADMIN — TRAMADOL HYDROCHLORIDE 50 MG: 50 TABLET, COATED ORAL at 18:46

## 2018-01-01 RX ADMIN — FUROSEMIDE 40 MG: 10 INJECTION, SOLUTION INTRAMUSCULAR; INTRAVENOUS at 05:11

## 2018-01-01 RX ADMIN — MORPHINE SULFATE 5 MG: 10 INJECTION INTRAVENOUS at 23:13

## 2018-01-01 RX ADMIN — LORAZEPAM 0.5 MG: 0.5 TABLET ORAL at 19:18

## 2018-01-01 RX ADMIN — ENOXAPARIN SODIUM 40 MG: 100 INJECTION SUBCUTANEOUS at 06:06

## 2018-01-01 RX ADMIN — CARVEDILOL 12.5 MG: 12.5 TABLET, FILM COATED ORAL at 09:19

## 2018-01-01 RX ADMIN — QUETIAPINE FUMARATE 25 MG: 25 TABLET ORAL at 06:30

## 2018-01-01 RX ADMIN — ENOXAPARIN SODIUM 40 MG: 100 INJECTION SUBCUTANEOUS at 04:02

## 2018-01-01 RX ADMIN — LORAZEPAM 0.25 MG: 0.5 TABLET ORAL at 04:04

## 2018-01-01 RX ADMIN — LORAZEPAM 0.25 MG: 0.5 TABLET ORAL at 21:46

## 2018-01-01 RX ADMIN — ASPIRIN 324 MG: 81 TABLET, CHEWABLE ORAL at 03:09

## 2018-01-01 RX ADMIN — SPIRONOLACTONE 25 MG: 25 TABLET ORAL at 09:19

## 2018-01-01 RX ADMIN — BENZOCAINE AND MENTHOL 1 LOZENGE: 15; 3.6 LOZENGE ORAL at 05:00

## 2018-01-01 RX ADMIN — FUROSEMIDE 20 MG: 20 TABLET ORAL at 04:02

## 2018-01-01 RX ADMIN — ENOXAPARIN SODIUM 40 MG: 100 INJECTION SUBCUTANEOUS at 05:51

## 2018-01-01 RX ADMIN — ENOXAPARIN SODIUM 40 MG: 100 INJECTION SUBCUTANEOUS at 06:25

## 2018-01-01 RX ADMIN — ONDANSETRON 4 MG: 4 TABLET, ORALLY DISINTEGRATING ORAL at 20:55

## 2018-01-01 RX ADMIN — ENOXAPARIN SODIUM 40 MG: 100 INJECTION SUBCUTANEOUS at 05:31

## 2018-01-01 RX ADMIN — FUROSEMIDE 40 MG: 40 TABLET ORAL at 13:54

## 2018-01-01 RX ADMIN — QUETIAPINE FUMARATE 25 MG: 25 TABLET ORAL at 21:03

## 2018-01-01 RX ADMIN — QUETIAPINE FUMARATE 25 MG: 25 TABLET ORAL at 17:28

## 2018-01-01 RX ADMIN — LORAZEPAM 0.25 MG: 0.5 TABLET ORAL at 15:19

## 2018-01-01 RX ADMIN — FUROSEMIDE 20 MG: 10 INJECTION, SOLUTION INTRAMUSCULAR; INTRAVENOUS at 00:25

## 2018-01-01 RX ADMIN — FUROSEMIDE 20 MG: 10 INJECTION, SOLUTION INTRAMUSCULAR; INTRAVENOUS at 03:20

## 2018-01-01 RX ADMIN — QUETIAPINE FUMARATE 25 MG: 25 TABLET ORAL at 15:16

## 2018-01-01 RX ADMIN — BENZOCAINE AND MENTHOL 1 LOZENGE: 15; 3.6 LOZENGE ORAL at 22:03

## 2018-01-01 RX ADMIN — BENZOCAINE AND MENTHOL 1 LOZENGE: 15; 3.6 LOZENGE ORAL at 05:33

## 2018-01-01 RX ADMIN — LORAZEPAM 0.5 MG: 1 TABLET ORAL at 06:29

## 2018-01-01 RX ADMIN — MORPHINE SULFATE 5 MG: 10 INJECTION INTRAVENOUS at 20:52

## 2018-01-01 RX ADMIN — QUETIAPINE FUMARATE 25 MG: 25 TABLET ORAL at 05:10

## 2018-01-01 RX ADMIN — RISPERIDONE 1 MG: 1 TABLET, FILM COATED ORAL at 20:16

## 2018-01-01 RX ADMIN — BENZOCAINE AND MENTHOL 1 LOZENGE: 15; 3.6 LOZENGE ORAL at 05:42

## 2018-01-01 RX ADMIN — TRAMADOL HYDROCHLORIDE 50 MG: 50 TABLET, COATED ORAL at 09:58

## 2018-01-01 RX ADMIN — ENOXAPARIN SODIUM 40 MG: 100 INJECTION SUBCUTANEOUS at 17:13

## 2018-01-01 RX ADMIN — TRAMADOL HYDROCHLORIDE 50 MG: 50 TABLET, COATED ORAL at 23:47

## 2018-01-01 RX ADMIN — QUETIAPINE FUMARATE 25 MG: 25 TABLET ORAL at 06:39

## 2018-01-01 RX ADMIN — TRAMADOL HYDROCHLORIDE 50 MG: 50 TABLET, COATED ORAL at 17:48

## 2018-01-01 RX ADMIN — LISINOPRIL 5 MG: 5 TABLET ORAL at 17:57

## 2018-01-01 RX ADMIN — TRAMADOL HYDROCHLORIDE 50 MG: 50 TABLET, COATED ORAL at 23:58

## 2018-01-01 RX ADMIN — QUETIAPINE FUMARATE 25 MG: 25 TABLET ORAL at 21:09

## 2018-01-01 RX ADMIN — ALPRAZOLAM 0.5 MG: 0.5 TABLET ORAL at 21:48

## 2018-01-01 RX ADMIN — FUROSEMIDE 20 MG: 20 TABLET ORAL at 09:03

## 2018-01-01 RX ADMIN — ONDANSETRON 4 MG: 4 TABLET, ORALLY DISINTEGRATING ORAL at 02:17

## 2018-01-01 RX ADMIN — CARVEDILOL 12.5 MG: 12.5 TABLET, FILM COATED ORAL at 18:34

## 2018-01-01 RX ADMIN — LISINOPRIL 5 MG: 5 TABLET ORAL at 09:19

## 2018-01-01 RX ADMIN — CARVEDILOL 3.12 MG: 3.12 TABLET, FILM COATED ORAL at 08:15

## 2018-01-01 RX ADMIN — CARVEDILOL 3.12 MG: 3.12 TABLET, FILM COATED ORAL at 18:10

## 2018-01-01 RX ADMIN — SPIRONOLACTONE 25 MG: 25 TABLET, FILM COATED ORAL at 05:32

## 2018-01-01 RX ADMIN — SPIRONOLACTONE 25 MG: 25 TABLET, FILM COATED ORAL at 04:02

## 2018-01-01 RX ADMIN — SPIRONOLACTONE 25 MG: 25 TABLET, FILM COATED ORAL at 13:46

## 2018-01-01 ASSESSMENT — ENCOUNTER SYMPTOMS
PND: 0
CONSTIPATION: 0
WEIGHT LOSS: 0
VOMITING: 1
MYALGIAS: 0
BLURRED VISION: 0
PALPITATIONS: 0
SHORTNESS OF BREATH: 1
ORTHOPNEA: 1
NAUSEA: 0
COUGH: 1
LOSS OF CONSCIOUSNESS: 0
NEUROLOGICAL NEGATIVE: 1
ORTHOPNEA: 0
WHEEZING: 0
FLANK PAIN: 0
GASTROINTESTINAL NEGATIVE: 1
PND: 0
NERVOUS/ANXIOUS: 1
RESPIRATORY NEGATIVE: 1
EYES NEGATIVE: 1
DEPRESSION: 0
FALLS: 0
PALPITATIONS: 0
PHOTOPHOBIA: 0
PALPITATIONS: 0
SPUTUM PRODUCTION: 0
MUSCULOSKELETAL NEGATIVE: 1
GASTROINTESTINAL NEGATIVE: 1
WEIGHT LOSS: 0
GASTROINTESTINAL NEGATIVE: 1
DIARRHEA: 0
MUSCULOSKELETAL NEGATIVE: 1
SPEECH CHANGE: 0
PALPITATIONS: 0
CARDIOVASCULAR NEGATIVE: 1
DEPRESSION: 0
FALLS: 0
CARDIOVASCULAR NEGATIVE: 1
HEMOPTYSIS: 0
FLANK PAIN: 0
WEAKNESS: 1
DOUBLE VISION: 0
CONSTITUTIONAL NEGATIVE: 1
FEVER: 0
MYALGIAS: 0
NAUSEA: 0
NAUSEA: 1
WEAKNESS: 1
COUGH: 0
HEADACHES: 0
TINGLING: 0
MEMORY LOSS: 0
PND: 1
FEVER: 0
MUSCULOSKELETAL NEGATIVE: 1
EYES NEGATIVE: 1
NEUROLOGICAL NEGATIVE: 1
WEAKNESS: 1
CLAUDICATION: 0
WEAKNESS: 1
SENSORY CHANGE: 0
TREMORS: 0
CARDIOVASCULAR NEGATIVE: 1
SEIZURES: 0
DIARRHEA: 0
BRUISES/BLEEDS EASILY: 0
HEMOPTYSIS: 0
BLURRED VISION: 0
CARDIOVASCULAR NEGATIVE: 1
DIZZINESS: 0
PALPITATIONS: 0
FOCAL WEAKNESS: 0
MEMORY LOSS: 0
MUSCULOSKELETAL NEGATIVE: 1
BRUISES/BLEEDS EASILY: 0
DIARRHEA: 0
SENSORY CHANGE: 0
CLAUDICATION: 0
MUSCULOSKELETAL NEGATIVE: 1
CHILLS: 0
NERVOUS/ANXIOUS: 1
DOUBLE VISION: 0
BACK PAIN: 0
TREMORS: 0
HEADACHES: 0
HEMOPTYSIS: 0
BACK PAIN: 0
CLAUDICATION: 0
TREMORS: 0
HEADACHES: 0
FLANK PAIN: 0
HALLUCINATIONS: 0
MUSCULOSKELETAL NEGATIVE: 1
COUGH: 0
EYE DISCHARGE: 0
NECK PAIN: 0
FEVER: 0
WHEEZING: 0
CONSTIPATION: 0
FOCAL WEAKNESS: 0
MUSCULOSKELETAL NEGATIVE: 1
ORTHOPNEA: 1
DIARRHEA: 0
CARDIOVASCULAR NEGATIVE: 1
MEMORY LOSS: 0
SEIZURES: 0
CLAUDICATION: 0
CHILLS: 0
HEARTBURN: 0
COUGH: 0
EYES NEGATIVE: 1
GASTROINTESTINAL NEGATIVE: 1
NEUROLOGICAL NEGATIVE: 1
PSYCHIATRIC NEGATIVE: 1
CLAUDICATION: 0
VOMITING: 0
FOCAL WEAKNESS: 0
HEARTBURN: 0
ABDOMINAL PAIN: 0
DOUBLE VISION: 0
HEADACHES: 0
FEVER: 0
PHOTOPHOBIA: 0
SORE THROAT: 0
NEUROLOGICAL NEGATIVE: 1
DEPRESSION: 0
FEVER: 0
WHEEZING: 0
COUGH: 0
PHOTOPHOBIA: 0
DIZZINESS: 0
NEUROLOGICAL NEGATIVE: 1
PHOTOPHOBIA: 0
NEUROLOGICAL NEGATIVE: 1
SHORTNESS OF BREATH: 1
NERVOUS/ANXIOUS: 1
NEUROLOGICAL NEGATIVE: 1
DIZZINESS: 0
ORTHOPNEA: 1
DIZZINESS: 0
NERVOUS/ANXIOUS: 1
INSOMNIA: 1
SPUTUM PRODUCTION: 0
MYALGIAS: 0
SPUTUM PRODUCTION: 0
PND: 1
CHILLS: 0
HEMOPTYSIS: 0
ABDOMINAL PAIN: 0
WEAKNESS: 1
GASTROINTESTINAL NEGATIVE: 1
CONSTITUTIONAL NEGATIVE: 1
BRUISES/BLEEDS EASILY: 0
SPEECH CHANGE: 0
SENSORY CHANGE: 0
MYALGIAS: 0
COUGH: 0
CLAUDICATION: 0
GASTROINTESTINAL NEGATIVE: 1
NERVOUS/ANXIOUS: 1
VOMITING: 0
ROS GI COMMENTS: ABDOMINAL BLOATING
NECK PAIN: 0
CONSTITUTIONAL NEGATIVE: 1
RESPIRATORY NEGATIVE: 1
HEARTBURN: 0
SPEECH CHANGE: 0
HALLUCINATIONS: 0
MUSCULOSKELETAL NEGATIVE: 1
TINGLING: 0
PALPITATIONS: 0
COUGH: 0
MYALGIAS: 0
FEVER: 0
HEMOPTYSIS: 0
CARDIOVASCULAR NEGATIVE: 1
CHILLS: 0
NECK PAIN: 0
WEAKNESS: 1
ABDOMINAL PAIN: 0
DIAPHORESIS: 0
FEVER: 0
TINGLING: 0
BACK PAIN: 0
ORTHOPNEA: 1
PALPITATIONS: 0
POLYDIPSIA: 0
NERVOUS/ANXIOUS: 1
EYES NEGATIVE: 1
PALPITATIONS: 1
BLURRED VISION: 0
SPUTUM PRODUCTION: 0
SPUTUM PRODUCTION: 0
SHORTNESS OF BREATH: 1
CONSTITUTIONAL NEGATIVE: 1
INSOMNIA: 1
RESPIRATORY NEGATIVE: 1
SHORTNESS OF BREATH: 1
BLURRED VISION: 0
NERVOUS/ANXIOUS: 1
SPEECH CHANGE: 0
BLURRED VISION: 0
FEVER: 0
DOUBLE VISION: 0
ORTHOPNEA: 1
CHILLS: 0
RESPIRATORY NEGATIVE: 1
FALLS: 0
GASTROINTESTINAL NEGATIVE: 1
WEAKNESS: 1
EYE PAIN: 0
HEARTBURN: 0
PND: 0
EYES NEGATIVE: 1
HEADACHES: 0
BLURRED VISION: 0
RESPIRATORY NEGATIVE: 1
BLOOD IN STOOL: 0
MYALGIAS: 0
BACK PAIN: 0
SPUTUM PRODUCTION: 0
MYALGIAS: 0
SHORTNESS OF BREATH: 1
DIZZINESS: 1
LOSS OF CONSCIOUSNESS: 0
INSOMNIA: 1
TREMORS: 0
PALPITATIONS: 0
GASTROINTESTINAL NEGATIVE: 1
INSOMNIA: 1
HALLUCINATIONS: 0
DIZZINESS: 0
MYALGIAS: 1
FEVER: 0
FALLS: 0
NERVOUS/ANXIOUS: 1
ABDOMINAL PAIN: 0
FEVER: 0
NAUSEA: 0
TINGLING: 0
NERVOUS/ANXIOUS: 1
SHORTNESS OF BREATH: 1
CONSTITUTIONAL NEGATIVE: 1
GASTROINTESTINAL NEGATIVE: 1
DOUBLE VISION: 0
SENSORY CHANGE: 0
DEPRESSION: 1
BRUISES/BLEEDS EASILY: 0
RESPIRATORY NEGATIVE: 1
WHEEZING: 0
WHEEZING: 0
CLAUDICATION: 0
WEAKNESS: 0
ABDOMINAL PAIN: 0
VOMITING: 0
DOUBLE VISION: 0
PND: 1
BACK PAIN: 0
CHILLS: 0
EYES NEGATIVE: 1
FLANK PAIN: 0
CARDIOVASCULAR NEGATIVE: 1
SHORTNESS OF BREATH: 1
RESPIRATORY NEGATIVE: 1
VOMITING: 0
BLOOD IN STOOL: 0
NEUROLOGICAL NEGATIVE: 1
HEARTBURN: 0
BACK PAIN: 0
DIAPHORESIS: 0
EYES NEGATIVE: 1
EYES NEGATIVE: 1
SEIZURES: 0
NERVOUS/ANXIOUS: 0
VOMITING: 0
NEUROLOGICAL NEGATIVE: 1
WEIGHT LOSS: 0
EYE PAIN: 0
SHORTNESS OF BREATH: 1
BLURRED VISION: 0
ABDOMINAL PAIN: 0
CARDIOVASCULAR NEGATIVE: 1
BLOOD IN STOOL: 0
DIZZINESS: 1
SPUTUM PRODUCTION: 0
CONSTITUTIONAL NEGATIVE: 1
SHORTNESS OF BREATH: 1
NEUROLOGICAL NEGATIVE: 1
NAUSEA: 0
ORTHOPNEA: 1
CLAUDICATION: 0
CONSTITUTIONAL NEGATIVE: 1
ORTHOPNEA: 1
NECK PAIN: 0
DEPRESSION: 1
EYE DISCHARGE: 0
COUGH: 0
PALPITATIONS: 0
DIAPHORESIS: 0
DOUBLE VISION: 0
HEMOPTYSIS: 0
DIAPHORESIS: 0
MUSCULOSKELETAL NEGATIVE: 1
ABDOMINAL PAIN: 0
CONSTITUTIONAL NEGATIVE: 1
WEIGHT LOSS: 0
MUSCULOSKELETAL NEGATIVE: 1
GASTROINTESTINAL NEGATIVE: 1
SENSORY CHANGE: 0
DEPRESSION: 1
ROS GI COMMENTS: ABDOMINAL BLOATING
SHORTNESS OF BREATH: 1
RESPIRATORY NEGATIVE: 1
CONSTITUTIONAL NEGATIVE: 1
DIAPHORESIS: 0
POLYDIPSIA: 0
NERVOUS/ANXIOUS: 1
NAUSEA: 0
EYES NEGATIVE: 1
CHILLS: 0
HEARTBURN: 0
FEVER: 0
EYES NEGATIVE: 1
DIZZINESS: 0
SHORTNESS OF BREATH: 1
COUGH: 1
EYES NEGATIVE: 1
VOMITING: 0
GASTROINTESTINAL NEGATIVE: 1
ORTHOPNEA: 1
ORTHOPNEA: 1
CONSTIPATION: 0
VOMITING: 0
CARDIOVASCULAR NEGATIVE: 1
FALLS: 0
COUGH: 0
HEADACHES: 0
SHORTNESS OF BREATH: 1
EYES NEGATIVE: 1
CLAUDICATION: 0
EYES NEGATIVE: 1
MYALGIAS: 0
NAUSEA: 0
MUSCULOSKELETAL NEGATIVE: 1
ABDOMINAL PAIN: 0
LOSS OF CONSCIOUSNESS: 0
CONSTITUTIONAL NEGATIVE: 1
DIZZINESS: 0
HEADACHES: 0
WEIGHT LOSS: 1
CLAUDICATION: 0
EYE DISCHARGE: 0
MYALGIAS: 0
SHORTNESS OF BREATH: 1

## 2018-01-01 ASSESSMENT — COGNITIVE AND FUNCTIONAL STATUS - GENERAL
DAILY ACTIVITIY SCORE: 24
CLIMB 3 TO 5 STEPS WITH RAILING: A LOT
SUGGESTED CMS G CODE MODIFIER MOBILITY: CJ
DRESSING REGULAR UPPER BODY CLOTHING: A LITTLE
MOBILITY SCORE: 21
SUGGESTED CMS G CODE MODIFIER DAILY ACTIVITY: CI
DAILY ACTIVITIY SCORE: 21
TURNING FROM BACK TO SIDE WHILE IN FLAT BAD: A LITTLE
HELP NEEDED FOR BATHING: A LITTLE
HELP NEEDED FOR BATHING: A LITTLE
SUGGESTED CMS G CODE MODIFIER DAILY ACTIVITY: CI
SUGGESTED CMS G CODE MODIFIER MOBILITY: CK
SUGGESTED CMS G CODE MODIFIER DAILY ACTIVITY: CJ
WALKING IN HOSPITAL ROOM: A LITTLE
MOBILITY SCORE: 24
MOBILITY SCORE: 19
HELP NEEDED FOR BATHING: A LITTLE
DAILY ACTIVITIY SCORE: 23
CLIMB 3 TO 5 STEPS WITH RAILING: A LOT
SUGGESTED CMS G CODE MODIFIER DAILY ACTIVITY: CH
WALKING IN HOSPITAL ROOM: A LITTLE
SUGGESTED CMS G CODE MODIFIER MOBILITY: CH
TOILETING: A LITTLE
SUGGESTED CMS G CODE MODIFIER MOBILITY: CH
MOBILITY SCORE: 24
STANDING UP FROM CHAIR USING ARMS: A LITTLE
DAILY ACTIVITIY SCORE: 23

## 2018-01-01 ASSESSMENT — MINNESOTA LIVING WITH HEART FAILURE QUESTIONNAIRE (MLHF)
DIFFICULTY SLEEPING WELL AT NIGHT: 5
WALKING ABOUT OR CLIMBING STAIRS DIFFICULT: 5
MAKING YOU STAY IN A HOSPITAL: 5
DIFFICULTY WITH RECREATIONAL PASTIMES, SPORTS, HOBBIES: 5
WORKING AROUND THE HOUSE OR YARD DIFFICULT: 5
COSTING YOU MONEY FOR MEDICAL CARE: 0
HAVING TO SIT OR LIE DOWN DURING THE DAY: 5
DIFFICULTY WORKING TO EARN A LIVING: 5
DIFFICULTY WITH SEXUAL ACTIVITIES: 5
EATING LESS FOODS YOU LIKE: 4
TOTAL_SCORE: 90
LOSS OF SELF CONTROL IN YOUR LIFE: 5
DIFFICULTY GOING AWAY FROM HOME: 4
GIVING YOU SIDE EFFECTS FROM TREATMENTS: 3
MAKING YOU WORRY: 5
DIFFICULTY TO CONCENTRATE OR REMEMBERING THINGS: 2
FEELING LIKE A BURDEN TO FAMILY AND FRIENDS: 5
DIFFICULTY SOCIALIZING WITH FAMILY OR FRIENDS: 4
TIRED, FATIGUED OR LOW ON ENERGY: 5
SWELLING IN ANKLES OR LEGS: 3
MAKING YOU FEEL DEPRESSED: 5
MAKING YOU SHORT OF BREATH: 5

## 2018-01-01 ASSESSMENT — PAIN SCALES - GENERAL
PAINLEVEL: 6=MODERATE PAIN
PAINLEVEL_OUTOF10: 0
PAINLEVEL_OUTOF10: 8
PAINLEVEL_OUTOF10: 0
PAINLEVEL_OUTOF10: ASSUMED PAIN PRESENT
PAINLEVEL_OUTOF10: 0
PAINLEVEL_OUTOF10: ASSUMED PAIN PRESENT
PAINLEVEL_OUTOF10: 5
PAINLEVEL_OUTOF10: 0
PAINLEVEL_OUTOF10: 5
PAINLEVEL_OUTOF10: 0
PAINLEVEL_OUTOF10: 6
PAINLEVEL_OUTOF10: 6
PAINLEVEL_OUTOF10: 5
PAINLEVEL_OUTOF10: 0
PAINLEVEL_OUTOF10: 5
PAINLEVEL_OUTOF10: 0
PAINLEVEL_OUTOF10: 0
PAINLEVEL_OUTOF10: 8
PAINLEVEL_OUTOF10: 5
PAINLEVEL_OUTOF10: 6
PAINLEVEL_OUTOF10: 0
PAINLEVEL_OUTOF10: 10
PAINLEVEL_OUTOF10: 5
PAINLEVEL_OUTOF10: 0
PAINLEVEL_OUTOF10: 1
PAINLEVEL_OUTOF10: 5
PAINLEVEL_OUTOF10: 5
PAINLEVEL_OUTOF10: 0
PAINLEVEL_OUTOF10: 0
PAINLEVEL_OUTOF10: 7
PAINLEVEL_OUTOF10: 5
PAINLEVEL_OUTOF10: 6
PAINLEVEL_OUTOF10: 0
PAINLEVEL_OUTOF10: 7
PAINLEVEL_OUTOF10: 0
PAINLEVEL_OUTOF10: 6
PAINLEVEL_OUTOF10: 0
PAINLEVEL_OUTOF10: 2
PAINLEVEL_OUTOF10: 7
PAINLEVEL_OUTOF10: 0
PAINLEVEL_OUTOF10: 2
PAINLEVEL_OUTOF10: 10
PAINLEVEL_OUTOF10: 5
PAINLEVEL_OUTOF10: 0
PAINLEVEL_OUTOF10: 7
PAINLEVEL_OUTOF10: 3
PAINLEVEL_OUTOF10: 0
PAINLEVEL_OUTOF10: 5
PAINLEVEL_OUTOF10: 0
PAINLEVEL_OUTOF10: 2
PAINLEVEL_OUTOF10: 0
PAINLEVEL_OUTOF10: 7
PAINLEVEL_OUTOF10: 0
PAINLEVEL_OUTOF10: 2

## 2018-01-01 ASSESSMENT — COPD QUESTIONNAIRES
COPD SCREENING SCORE: 0
DO YOU EVER COUGH UP ANY MUCUS OR PHLEGM?: NO/ONLY WITH OCCASIONAL COLDS OR INFECTIONS
HAVE YOU SMOKED AT LEAST 100 CIGARETTES IN YOUR ENTIRE LIFE: NO/DON'T KNOW
HAVE YOU SMOKED AT LEAST 100 CIGARETTES IN YOUR ENTIRE LIFE: YES
DO YOU EVER COUGH UP ANY MUCUS OR PHLEGM?: NO/ONLY WITH OCCASIONAL COLDS OR INFECTIONS
HAVE YOU SMOKED AT LEAST 100 CIGARETTES IN YOUR ENTIRE LIFE: NO/DON'T KNOW
DURING THE PAST 4 WEEKS HOW MUCH DID YOU FEEL SHORT OF BREATH: NONE/LITTLE OF THE TIME
COPD SCREENING SCORE: 4
DURING THE PAST 4 WEEKS HOW MUCH DID YOU FEEL SHORT OF BREATH: NONE/LITTLE OF THE TIME
DURING THE PAST 4 WEEKS HOW MUCH DID YOU FEEL SHORT OF BREATH: SOME OF THE TIME
DO YOU EVER COUGH UP ANY MUCUS OR PHLEGM?: YES, A FEW DAYS A WEEK OR MONTH
IN THE PAST 12 MONTHS DO YOU DO LESS THAN YOU USED TO BECAUSE OF YOUR BREATHING PROBLEMS: STRONGLY AGREE
COPD SCREENING SCORE: 2

## 2018-01-01 ASSESSMENT — PATIENT HEALTH QUESTIONNAIRE - PHQ9
2. FEELING DOWN, DEPRESSED, IRRITABLE, OR HOPELESS: NOT AT ALL
SUM OF ALL RESPONSES TO PHQ9 QUESTIONS 1 AND 2: 0
2. FEELING DOWN, DEPRESSED, IRRITABLE, OR HOPELESS: SEVERAL DAYS
SUM OF ALL RESPONSES TO PHQ9 QUESTIONS 1 AND 2: 0
SUM OF ALL RESPONSES TO PHQ9 QUESTIONS 1 AND 2: 0
9. THOUGHTS THAT YOU WOULD BE BETTER OFF DEAD, OR OF HURTING YOURSELF: NOT AT ALL
2. FEELING DOWN, DEPRESSED, IRRITABLE, OR HOPELESS: NOT AT ALL
1. LITTLE INTEREST OR PLEASURE IN DOING THINGS: NOT AT ALL
4. FEELING TIRED OR HAVING LITTLE ENERGY: NOT AT ALL
1. LITTLE INTEREST OR PLEASURE IN DOING THINGS: NOT AT ALL
8. MOVING OR SPEAKING SO SLOWLY THAT OTHER PEOPLE COULD HAVE NOTICED. OR THE OPPOSITE, BEING SO FIGETY OR RESTLESS THAT YOU HAVE BEEN MOVING AROUND A LOT MORE THAN USUAL: NOT AT ALL
1. LITTLE INTEREST OR PLEASURE IN DOING THINGS: NOT AT ALL
3. TROUBLE FALLING OR STAYING ASLEEP OR SLEEPING TOO MUCH: NOT AT ALL
SUM OF ALL RESPONSES TO PHQ QUESTIONS 1-9: 1
2. FEELING DOWN, DEPRESSED, IRRITABLE, OR HOPELESS: NOT AT ALL
1. LITTLE INTEREST OR PLEASURE IN DOING THINGS: NOT AT ALL
2. FEELING DOWN, DEPRESSED, IRRITABLE, OR HOPELESS: NOT AT ALL
7. TROUBLE CONCENTRATING ON THINGS, SUCH AS READING THE NEWSPAPER OR WATCHING TELEVISION: NOT AT ALL
5. POOR APPETITE OR OVEREATING: NOT AT ALL
1. LITTLE INTEREST OR PLEASURE IN DOING THINGS: NOT AT ALL
SUM OF ALL RESPONSES TO PHQ9 QUESTIONS 1 AND 2: 0
6. FEELING BAD ABOUT YOURSELF - OR THAT YOU ARE A FAILURE OR HAVE LET YOURSELF OR YOUR FAMILY DOWN: NOT AL ALL
SUM OF ALL RESPONSES TO PHQ9 QUESTIONS 1 AND 2: 1

## 2018-01-01 ASSESSMENT — GAIT ASSESSMENTS
GAIT LEVEL OF ASSIST: SUPERVISED
DEVIATION: OTHER (COMMENT)
DISTANCE (FEET): 200

## 2018-01-01 ASSESSMENT — LIFESTYLE VARIABLES
CONSUMPTION TOTAL: POSITIVE
ON A TYPICAL DAY WHEN YOU DRINK ALCOHOL HOW MANY DRINKS DO YOU HAVE: 1
EVER HAD A DRINK FIRST THING IN THE MORNING TO STEADY YOUR NERVES TO GET RID OF A HANGOVER: NO
TOTAL SCORE: 0
ALCOHOL_USE: NO
EVER_SMOKED: NEVER
EVER FELT BAD OR GUILTY ABOUT YOUR DRINKING: NO
EVER_SMOKED: NEVER
EVER_SMOKED: UNABLE TO EVALUATE AT THIS TIME - NEEDS ASSESSMENT PRIOR TO DISCHARGE
DO YOU DRINK ALCOHOL: NO
HOW MANY TIMES IN THE PAST YEAR HAVE YOU HAD 5 OR MORE DRINKS IN A DAY: 1
ALCOHOL_USE: NO
DO YOU DRINK ALCOHOL: NO
HAVE PEOPLE ANNOYED YOU BY CRITICIZING YOUR DRINKING: NO
TOTAL SCORE: 0
ALCOHOL_USE: YES
AVERAGE NUMBER OF DAYS PER WEEK YOU HAVE A DRINK CONTAINING ALCOHOL: 1
SUBSTANCE_ABUSE: 1
TOTAL SCORE: 0
EVER_SMOKED: YES
SUBSTANCE_ABUSE: 1
HAVE YOU EVER FELT YOU SHOULD CUT DOWN ON YOUR DRINKING: NO
EVER_SMOKED: YES
SUBSTANCE_ABUSE: 0

## 2018-01-01 ASSESSMENT — NEW YORK HEART ASSOCIATION (NYHA) CLASSIFICATION: NYHA FUNCTIONAL CLASS: CLASS IV

## 2018-01-01 ASSESSMENT — 6 MINUTE WALK TEST (6MWT): TOTAL DISTANCE WALKED (METERS): 329.2

## 2018-01-01 ASSESSMENT — ACTIVITIES OF DAILY LIVING (ADL): TOILETING: INDEPENDENT

## 2018-06-26 NOTE — ED PROVIDER NOTES
"ED Provider Note    Scribed for Dr. Nathaniel Curiel M.D. by Ryan Porter. 6/25/2018  11:30 PM    Primary care provider: Pcp Pt States None  Means of arrival: Walk In  History obtained from: patient  History limited by: None     CHIEF COMPLAINT  Chief Complaint   Patient presents with   • Shortness of Breath     X 1 wk with gradual increase in severity. Pt states, \"I was heart failure in January 2016. It feels like that.\"     HPI  Ryan Arteaga is a 38 y.o. male who presents to the Emergency Department for shortness of breath. The patient complains of progressively worsening shortness of breath for the last two weeks. He complains of a dry non productive cough associated with his shortness of breath. He reports his shortness of breath is exacerbated by coughing and deep inspiration. He denies any alleviating factors of his shortness of breath. He has not treated his symptoms with any medications.   The patient was on Lasix, but reports he has not been able to fill his Lasix prescription in the last 1 year secondary to lack of finance.     The patient denies any chest pain, nausea, vomiting, leg swelling, leg pain, abdominal pain, or diarrhea.       REVIEW OF SYSTEMS  Pertinent positives include shortness of breath, cough. Pertinent negatives include no chest pain, nausea, vomiting, leg swelling, leg pain, abdominal pain, or diarrhea. As above, all other systems reviewed and are negative.   See HPI for further details.     PAST MEDICAL HISTORY   has a past medical history of Gastritis.    SURGICAL HISTORY  patient denies any surgical history    SOCIAL HISTORY  Social History   Substance Use Topics   • Smoking status: Never Smoker   • Alcohol use Yes      Comment: social      History   Drug Use No     FAMILY HISTORY  None noted.     CURRENT MEDICATIONS  Home Medications    **Home medications have not yet been reviewed for this encounter**       ALLERGIES  No Known Allergies    PHYSICAL EXAM  /79   Pulse (!) 107   " Temp 36.6 °C (97.9 °F)   Resp (!) 22   Ht 1.829 m (6')   Wt 101.6 kg (223 lb 15.8 oz)   SpO2 98%   BMI 30.38 kg/m²      Constitutional: Well developed, Well nourished, slight slight distress, Non-toxic appearance.   HENT: Normocephalic, Atraumatic, Bilateral external ears normal, Oropharynx moist, No oral exudates.   Eyes: PERRLA, EOMI, Conjunctiva normal, No discharge.   Neck: No tenderness, Supple, No stridor.   Lymphatic: No lymphadenopathy noted.   Cardiovascular: tachycardic heart rate, Normal rhythm.   Thorax & Lungs: Clear to auscultation bilaterally, slight respiratory distress, No wheezing,   crackles.  At base  Abdomen: Soft, No tenderness, No masses, No pulsatile masses.   Skin: Warm, Dry, No erythema, No rash.   Extremities:, No edema No cyanosis.   Musculoskeletal: No tenderness to palpation or major deformities noted.  Intact distal pulses  Neurologic: Awake, alert. Moves all extremities spontaneously.  Psychiatric: Affect, Judgment normal, Mood normal.     LABS  Results for orders placed or performed during the hospital encounter of 06/25/18   CBC WITH DIFFERENTIAL   Result Value Ref Range    WBC 10.7 4.8 - 10.8 K/uL    RBC 4.05 (L) 4.70 - 6.10 M/uL    Hemoglobin 12.5 (L) 14.0 - 18.0 g/dL    Hematocrit 36.5 (L) 42.0 - 52.0 %    MCV 90.1 81.4 - 97.8 fL    MCH 30.9 27.0 - 33.0 pg    MCHC 34.2 33.7 - 35.3 g/dL    RDW 47.5 35.9 - 50.0 fL    Platelet Count 238 164 - 446 K/uL    MPV 10.4 9.0 - 12.9 fL    Neutrophils-Polys 61.40 44.00 - 72.00 %    Lymphocytes 28.60 22.00 - 41.00 %    Monocytes 6.40 0.00 - 13.40 %    Eosinophils 2.50 0.00 - 6.90 %    Basophils 0.70 0.00 - 1.80 %    Immature Granulocytes 0.40 0.00 - 0.90 %    Nucleated RBC 0.00 /100 WBC    Neutrophils (Absolute) 6.54 1.82 - 7.42 K/uL    Lymphs (Absolute) 3.05 1.00 - 4.80 K/uL    Monos (Absolute) 0.68 0.00 - 0.85 K/uL    Eos (Absolute) 0.27 0.00 - 0.51 K/uL    Baso (Absolute) 0.07 0.00 - 0.12 K/uL    Immature Granulocytes (abs) 0.04 0.00 -  0.11 K/uL    NRBC (Absolute) 0.00 K/uL   COMP METABOLIC PANEL   Result Value Ref Range    Sodium 142 135 - 145 mmol/L    Potassium 3.7 3.6 - 5.5 mmol/L    Chloride 107 96 - 112 mmol/L    Co2 23 20 - 33 mmol/L    Anion Gap 12.0 (H) 0.0 - 11.9    Glucose 106 (H) 65 - 99 mg/dL    Bun 14 8 - 22 mg/dL    Creatinine 1.14 0.50 - 1.40 mg/dL    Calcium 9.1 8.5 - 10.5 mg/dL    AST(SGOT) 35 12 - 45 U/L    ALT(SGPT) 57 (H) 2 - 50 U/L    Alkaline Phosphatase 39 30 - 99 U/L    Total Bilirubin 0.9 0.1 - 1.5 mg/dL    Albumin 4.1 3.2 - 4.9 g/dL    Total Protein 6.7 6.0 - 8.2 g/dL    Globulin 2.6 1.9 - 3.5 g/dL    A-G Ratio 1.6 g/dL   ESTIMATED GFR   Result Value Ref Range    GFR If African American >60 >60 mL/min/1.73 m 2    GFR If Non African American >60 >60 mL/min/1.73 m 2   EKG (NOW)   Result Value Ref Range    Report       [facility] Emergency Dept.    Test Date:  2018  Pt Name:    REINA QIU                Department: ER  MRN:        [id number]                      Room:  Gender:     Male                         Technician: [id number]  :        1980                   Requested By:ER TRIAGE PROTOCOL  Order #:    [id number]                    Reading MD:    Measurements  Intervals                                Axis  Rate:       117                          P:          58  HI:         140                          QRS:        -1  QRSD:       94                           T:          81  QT:         308  QTc:        430    Interpretive Statements  SINUS TACHYCARDIA  PROBABLE LEFT ATRIAL ABNORMALITY  PROBABLE LEFT VENTRICULAR HYPERTROPHY  Compared to ECG 2016 09:36:09  Sinus rhythm no longer present  Prolonged QT interval no longer present        All labs reviewed by me.    RADIOLOGY  DX-CHEST-LIMITED (1 VIEW)   Final Result         1.  Mild pulmonary edema, stable   2.  Cardiomegaly        The radiologist's interpretation of all radiological studies have been reviewed by me.    COURSE & MEDICAL  DECISION MAKING  Pertinent Labs & Imaging studies reviewed. (See chart for details)    11:30 PM - Patient seen and examined at bedside. Ordered Chest X Ray, GFR, BNP, blood culture, CBC, CMP, EKG to evaluate his symptoms. The differential diagnoses include but are not limited to: congestive heart failure, substance abuse, anxiety.         Decision Making:  Patient with dyspnea, recurrence of CHF but this seems fairly mild at this point I think he would just restart his medications which is been noncompliant with    FINAL IMPRESSION  CHF     IRyan (Tonia), am scribing for, and in the presence of, Nathaniel Curiel M.D..    Electronically signed by: Ryan Porter (Tonia), 6/25/2018    Nathaniel FULTON M.D. personally performed the services described in this documentation, as scribed by Ryan Porter in my presence, and it is both accurate and complete.    The note accurately reflects work and decisions made by me.  Nathaniel Curiel  6/26/2018  1:27 AM

## 2018-06-26 NOTE — ED NOTES
Pt ambulates to room with IWOB and SOB tachypnea noted   Placed on monitor, speaks full sentences, 95% on room air

## 2018-06-26 NOTE — ED TRIAGE NOTES
"Ryan Arteaga  38 y.o. male  Chief Complaint   Patient presents with   • Shortness of Breath     X 1 wk with gradual increase in severity. Pt states, \"I was heart failure in January 2016. It feels like that.\"       Pt amb to triage with steady gait for above complaint. Pt denies swelling/edema to lower extremities.   Pt is alert and oriented, speaking in full sentences, follows commands and responds appropriately to questions. NAD. Increased WOB noted. EKG done prior to triage..  Pt placed in lobby. Pt educated on triage process. Pt encouraged to alert staff for any changes.    "

## 2018-07-27 PROBLEM — I50.9 CHF EXACERBATION (HCC): Status: ACTIVE | Noted: 2018-01-01

## 2018-07-27 PROBLEM — I50.23 ACUTE ON CHRONIC SYSTOLIC CONGESTIVE HEART FAILURE (HCC): Status: ACTIVE | Noted: 2018-01-01

## 2018-07-27 PROBLEM — J96.01 ACUTE RESPIRATORY FAILURE WITH HYPOXIA (HCC): Status: ACTIVE | Noted: 2018-01-01

## 2018-07-27 PROBLEM — I50.43 ACUTE ON CHRONIC COMBINED SYSTOLIC AND DIASTOLIC CONGESTIVE HEART FAILURE (HCC): Status: ACTIVE | Noted: 2018-01-01

## 2018-07-27 PROBLEM — E66.9 OBESITY (BMI 35.0-39.9 WITHOUT COMORBIDITY): Status: ACTIVE | Noted: 2018-01-01

## 2018-07-27 PROBLEM — I42.7 CARDIOMYOPATHY SECONDARY TO DRUG (HCC): Status: ACTIVE | Noted: 2018-01-01

## 2018-07-27 NOTE — H&P
Hospital Medicine History & Physical Note    Date of Service  7/27/2018    Primary Care Physician  Pcp Pt States None    Consultants  none    Code Status  Full code     Chief Complaint  Shortness of breath     History of Presenting Illness  38 y.o. male with history of drug abuse, with subsequent cardiomyopathy and congestive heart failure, with noncompliance on medication regimens and noncompliance with dietary restrictions was in his usual state of health until several weeks prior to admission.  He reports the onset of shortness of breath, made worse with lying down or any exertion.  The shortness of breath this was initially somewhat improved with rest, however in the days just prior to admission he noted worsening shortness of breath even at rest.  He reports eating salty foods on the regular.  He denies taking any of his medications.    Review of Systems  Review of Systems   Constitutional: Negative.    HENT: Negative.    Eyes: Negative.    Respiratory: Positive for shortness of breath.    Cardiovascular: Positive for orthopnea.   Gastrointestinal: Negative.    Genitourinary: Negative.    Musculoskeletal: Negative.    Skin: Negative.    Neurological: Negative.    Endo/Heme/Allergies: Negative.    Psychiatric/Behavioral: Negative.        Past Medical History   has a past medical history of Congestive heart failure (HCC) and Gastritis.    Surgical History   has no past surgical history on file.     Family History  family history includes Heart Disease in his father; Stroke in his mother.     Social History   reports that he has been smoking Cigarettes.  He has been smoking about 0.07 packs per day. He has never used smokeless tobacco. He reports that he drinks alcohol. He reports that he uses drugs.    Allergies  No Known Allergies    Medications  Prior to Admission Medications   Prescriptions Last Dose Informant Patient Reported? Taking?   aspirin (ASA) 81 MG Chew Tab chewable tablet 6/15/2016  No No   Sig: Take  1 Tab by mouth every day.   carvedilol (COREG) 12.5 MG Tab Not Taking at Unknown time  No No   Sig: Take 1 Tab by mouth 2 times a day, with meals.   Patient not taking: Reported on 7/27/2018   furosemide (LASIX) 40 MG Tab 7/24/2018  No No   Sig: Take 1 Tab by mouth every day.   lisinopril (PRINIVIL) 5 MG Tab 6/15/2016  No No   Sig: Take 1 Tab by mouth every day.   Patient not taking: Reported on 7/27/2018   magnesium oxide (MAG-OX) 400 (241.3 MG) MG Tab tablet 6/15/2016  No No   Sig: Take 1 Tab by mouth every day.   Patient not taking: Reported on 7/27/2018   spironolactone (ALDACTONE) 25 MG Tab Not Taking at Unknown time  No No   Sig: Take 1 Tab by mouth every day.   Patient not taking: Reported on 7/27/2018      Facility-Administered Medications: None       Physical Exam  Blood Pressure: 132/102   Temperature: 37.6 °C (99.7 °F)   Pulse: (!) 118   Respiration: (!) 30   Pulse Oximetry: 97 %     Physical Exam   Constitutional: He is oriented to person, place, and time. He appears well-developed and well-nourished. No distress.   HENT:   Head: Normocephalic and atraumatic.   Eyes: Pupils are equal, round, and reactive to light. Conjunctivae are normal.   Neck: Normal range of motion. Neck supple. No tracheal deviation present. No thyromegaly present.   Cardiovascular: Normal rate, regular rhythm and normal heart sounds.  Exam reveals no gallop and no friction rub.    No murmur heard.  Pulmonary/Chest: Breath sounds normal. He is in respiratory distress. He has no wheezes.   Abdominal: Soft. Bowel sounds are normal. He exhibits no distension and no mass. There is no tenderness. There is no rebound and no guarding.   Musculoskeletal: Normal range of motion. He exhibits edema.   Lymphadenopathy:     He has no cervical adenopathy.   Neurological: He is alert and oriented to person, place, and time. No cranial nerve deficit.   Skin: Skin is warm and dry. He is not diaphoretic.   Psychiatric: He has a normal mood and  affect.   Nursing note and vitals reviewed.      Laboratory:  Recent Labs      07/27/18   0146   WBC  13.3*   RBC  4.08*   HEMOGLOBIN  11.8*   HEMATOCRIT  38.2*   MCV  93.6   MCH  28.9   MCHC  30.9*   RDW  49.3   PLATELETCT  359   MPV  10.2     Recent Labs      07/27/18   0146   SODIUM  138   POTASSIUM  4.0   CHLORIDE  106   CO2  20   GLUCOSE  203*   BUN  15   CREATININE  0.70   CALCIUM  8.8     Recent Labs      07/27/18   0146   ALTSGPT  54*   ASTSGOT  18   ALKPHOSPHAT  47   TBILIRUBIN  0.5   LIPASE  38   GLUCOSE  203*         Recent Labs      07/27/18   0146   BNPBTYPENAT  640*         Lab Results   Component Value Date    TROPONINI 0.03 07/27/2018       Urinalysis:    No results found for: SPECGRAVITY, GLUCOSEUR, KETONES, NITRITE, WBCURINE, RBCURINE, BACTERIA, EPITHELCELL     Imaging:  DX-CHEST-PORTABLE (1 VIEW)   Final Result         1.  Pulmonary edema and/or infiltrates.   2.  Cardiomegaly      Echocardiogram Comp W/O Cont    (Results Pending)         Assessment/Plan:  I anticipate this patient will require at least two midnights for appropriate medical management, necessitating inpatient admission.    * CHF exacerbation (HCC)   Assessment & Plan    Admit to telemetry.  Patient has been noncompliant with beta blockade and ACE inhibitor therapy so these will be held, continue with intravenous diuresis as tolerated.  Discussed sodium intake with patient, however this will need to be further discussed with dietary.        Obesity (BMI 35.0-39.9 without comorbidity)   Assessment & Plan    Body mass index is 35.67 kg/m².          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  Monitor.        Acute on chronic systolic congestive heart failure (HCC)   Assessment & Plan    In setting of medication noncompliance        Drug abuse- (present on admission)   Assessment & Plan    Reported cessation of the same.             VTE prophylaxis: SCD, Lovenox

## 2018-07-27 NOTE — ASSESSMENT & PLAN NOTE
- due to pulmonary edema from acute on chronic heart failure  - cont O2 supplementation and wean as tolerated  - cont IV diuresis

## 2018-07-27 NOTE — ED TRIAGE NOTES
Pt presents to the ED via EMS w/ SOB x 1 week. Pt tachypneic and appears very anxious. SOB worse w/ exertion. States he takes lasix. Pt says he has midsternal CP when he feels a cough coming on. + productive cough x 2 months. Denies fevers, abd pain, n/v/d. Denies any new BLE swelling. Hx of CHF. A/Ox4. Respirations even and labored. FREDA.

## 2018-07-27 NOTE — PROGRESS NOTES
Pt seen and examined.    37yo M with PMHx of chronic systolic/diastolic heart failure due to polysubstance abuse was admitted to the hospital for acute on chronic systolic/diastolic heart failure with acute respiratory failure with hypoxia.     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  Monitor.    Obesity (BMI 35.0-39.9 without comorbidity)  Body mass index is 35.67 kg/m².    Acute respiratory failure with hypoxia (HCC)  - due to pulmonary edema from acute on chronic heart failure  - cont O2 supplementation and wean as tolerated  - cont IV diuresis

## 2018-07-27 NOTE — ED PROVIDER NOTES
CHIEF COMPLAINT  Chief Complaint   Patient presents with   • Shortness of Breath   • Chest Pain       HPI  Ryan Arteaga is a 38 y.o. male who presents shortness of breath that has been recently prevalent over the past month.  Has some midline chest pain as well and associated cough with yellow sputum.  Patient does have a history of congestive heart failure.  Likely secondary to methamphetamine abuse in the past.  Last echocardiogram was in January 2016 with an EF of 20%.  Patient states that he has not seen his cardiologist, Dr. boogie, and approximately 2-1/2 years.  He states that he is noncompliant with any of his medications other than Lasix and occasionally aspirin.  He states that he does not like the way the other medications make him feel and that they make him feel tired.  He denies lower extremity swelling.  Denies gaining weight.    REVIEW OF SYSTEMS  See HPI for further details. All other systems are negative.     PAST MEDICAL HISTORY   has a past medical history of Congestive heart failure (HCC) and Gastritis.    SOCIAL HISTORY  Social History     Social History Main Topics   • Smoking status: Light Tobacco Smoker     Packs/day: 0.07     Types: Cigarettes   • Smokeless tobacco: Never Used   • Alcohol use Yes      Comment: social   • Drug use: No      Comment: past medical hx   • Sexual activity: Not on file       SURGICAL HISTORY  patient denies any surgical history    CURRENT MEDICATIONS  Home Medications    **Home medications have not yet been reviewed for this encounter**         ALLERGIES  No Known Allergies    PHYSICAL EXAM  VITAL SIGNS: /91   Pulse (!) 116   Resp 18   Ht 1.829 m (6')   Wt 119.3 kg (263 lb 0.1 oz)   SpO2 96%   BMI 35.67 kg/m²   Pulse ox interpretation: I interpret this pulse ox as normal.  Constitutional: Alert in no apparent distress.  HENT: No signs of trauma, Bilateral external ears normal, Nose normal.   Eyes: Pupils are equal and reactive, Conjunctiva normal,  "Non-icteric.   Neck: Normal range of motion, No tenderness, Supple, No stridor.   Cardiovascular: Tachycardic rate and regular rhythm, no murmurs.   Thorax & Lungs: No respiratory distress, No wheezing, No chest tenderness.  Fine bibasilar rales.  Abdomen: Bowel sounds normal, Soft, No tenderness, No masses, No pulsatile masses. No peritoneal signs.  Skin: Warm, Dry, No erythema, No rash.   Back: No bony tenderness, No CVA tenderness.   Extremities: Intact distal pulses, No edema, No tenderness, No cyanosis  Neurologic: Alert , Normal motor function and gait, Normal sensory function, No focal deficits noted.       DIAGNOSTIC STUDIES / PROCEDURES    EKG  7/27/2018 at 1:53 AM  Sinus tachycardia at 115  KS, QRS, QTC within normal limits  Normal axis  Left atrial abnormality  No T-wave inversions  No ST elevations or depressions  Grossly unchanged from prior EKG in June 2018    LABS  Labs Reviewed   CBC WITH DIFFERENTIAL - Abnormal; Notable for the following:        Result Value    WBC 13.3 (*)     RBC 4.08 (*)     Hemoglobin 11.8 (*)     Hematocrit 38.2 (*)     MCHC 30.9 (*)     Lymphocytes 19.50 (*)     Immature Granulocytes 1.30 (*)     Neutrophils (Absolute) 9.49 (*)     Baso (Absolute) 0.13 (*)     Immature Granulocytes (abs) 0.17 (*)     All other components within normal limits   COMP METABOLIC PANEL - Abnormal; Notable for the following:     Anion Gap 12.0 (*)     Glucose 203 (*)     ALT(SGPT) 54 (*)     All other components within normal limits   BTYPE NATRIURETIC PEPTIDE - Abnormal; Notable for the following:     B Natriuretic Peptide 640 (*)     All other components within normal limits   LACTIC ACID - Abnormal; Notable for the following:     Lactic Acid 3.1 (*)     All other components within normal limits   LIPASE   TROPONIN   BLOOD CULTURE    Narrative:     1 of 2 for Blood Culture x 2 sites order. Per Hospital  Policy: Only change Specimen Src: to \"Line\" if specified by  physician order.   BLOOD CULTURE " "   Narrative:     2 of 2 blood culture x2  Sites order. Per Hospital Policy:  Only change Specimen Src: to \"Line\" if specified by physician  order.   ESTIMATED GFR   LACTIC ACID   URINE DRUG SCREEN   URINALYSIS       RADIOLOGY  DX-CHEST-PORTABLE (1 VIEW)   Final Result         1.  Pulmonary edema and/or infiltrates.   2.  Cardiomegaly            COURSE & MEDICAL DECISION MAKING  Pertinent Labs & Imaging studies reviewed. (See chart for details)  38 y.o. male presenting with shortness of breath symptoms and chest pain.  Associated cough that is with productive sputum that is yellow.  No hemoptysis.  No lower extremity swelling.  Patient does have a history of severe CHF likely secondary to methamphetamine abuse in the past.  Has been noncompliant with his medications.  Has not followed up with cardiology on an outpatient basis.    Given the indolent nature of the patient's shortness of breath symptoms over the past month, and given his rather severe CHF diagnosis with prior EF of 20%, I suspect that the patient's shortness of breath symptoms are primarily secondary to CHF exacerbation.  Pneumonia cannot fully be ruled out.  Patient did meets sepsis criteria with tachycardia and leukocytosis.  Was started on IV antibiotics for this.  Given the patient's severe CHF however and  stronger suspicion for CHF exacerbation rather than pneumonia, patient was not given IV fluid bolus due to concerns for possible worsening of the patient's CHF.  Has elevated BNP and pulmonary edema on chest x-ray.  Was given furosemide, aspirin, nitro.    No signs of acute ischemia.  CHF exacerbation likely secondary to noncompliance of his medications and noncompliance with diet.  No fever.  No active cough here in the emergency department.  No hypoxia.  Normal blood pressure.    Patient was admitted to the hospitalist service for further management.      FINAL IMPRESSION  1. Acute on chronic systolic congestive heart failure (HCC)    2. " Noncompliance with medication regimen            Electronically signed by: Nathaniel Pepe, 7/27/2018 1:34 AM

## 2018-07-27 NOTE — HEART FAILURE PROGRAM
Cardiovascular Nurse Navigator () Advanced Heart Failure Program Inpatient Progress Note:    Pt in ER awaiting admission to Telemetry. Has not been seen at the [facility] clinic since June of 2016, last echo 1/31/16 shows EF of 20%.     H&P indicates hx of substance abuse and subsequent CM also that pt has been non compliant with medications.    Patient has Medicaid coverage and [place]. Medicaid covers HF medications. Patient will require extensive education on medications and their importance in managing his symptoms and slowing the progression of his disease.    UDS and repeat echo ordered. At his last visit at the [facility] clinic in 2016, Dr. Galvan indicated that patient would be considered for device per guidelines after three months of GDMT.    Medication non-compliance and a positive UDS (again this is pending) would preclude device candidacy.    Advanced Care Planning:  No AD on file. Full code status at the time of this note's filing. No record of prior conversation about end of life.    The ACC recommends engaging palliative care as part of optimization of HFrEF treatment to solicit goals of care and focus on quality of life throughout the clinical course of HF.    Once diagnostics are in, please consider an order for palliative to discuss Advanced Care Planning with MR. Arteaga.      Speaking with patients frankly about their end-of-life wishes is one of the most important things a palliative care team can do. This is especially important in the context of heart failure, since it’s such an unpredictable disease.      Thank you and please call with questions, Concetta      Jul 31, 2018  2:00 PM PDT  Heart Failure New Patient with JESSE Bah  [facility] (--) [address], [facility] [address]  [facility] [address]  [phone]   Aug 10, 2018 10:50 AM PDT  New Patient with Destini Lee M.D.  [facility] ([facility] [address] [facility]  [address]  [phone]

## 2018-07-27 NOTE — ASSESSMENT & PLAN NOTE
- In setting of medication noncompliance and hx of polysubstance abuse  - cont IV diuresis; will re-initiate ASA, BB, Aldactone, and ACE-I  - cont monitoring

## 2018-07-27 NOTE — ASSESSMENT & PLAN NOTE
Admit to telemetry.  Patient has been noncompliant with beta blockade and ACE inhibitor therapy so these will be held, continue with intravenous diuresis as tolerated.  Discussed sodium intake with patient, however this will need to be further discussed with dietary.

## 2018-07-28 NOTE — ED PROVIDER NOTES
ED Provider Note  CHIEF COMPLAINT  Chief Complaint   Patient presents with   • Shortness of Breath       HPI  Ryan Arteaga is a 38 y.o. male who presents complaining of some left anterior chest pain associated with difficulty breathing and near syncope.  Patient was hospitalized and discharged from the hospital today.  He was in the cafeteria waiting for ride home he developed this chest pain and difficulty breathing and near syncope.  He said he feels awful.  He arrives in the ED tachycardic.  History of methamphetamine and marijuana abuse.    REVIEW OF SYSTEMS  No headache, no jaw pain, no chest pain, no abdominal pain.  ALL OTHER SYSTEMS NEGATIVE    ALLERGIES  No Known Allergies    CURRENT MEDICATIONS  Home Medications     Reviewed by Jm Garduno R.N. (Registered Nurse) on 07/28/18 at 1531  Med List Status: Not Addressed   Medication Last Dose Status   ALPRAZolam (XANAX) 0.5 MG Tab  Active   aspirin (ASA) 81 MG Chew Tab chewable tablet  Active   carvedilol (COREG) 12.5 MG Tab  Active   lisinopril (PRINIVIL) 5 MG Tab  Active   spironolactone (ALDACTONE) 25 MG Tab  Active                PAST MEDICAL HISTORY  Past Medical History:   Diagnosis Date   • Congestive heart failure (HCC)    • Gastritis        FAMILY HISTORY  Family History   Problem Relation Age of Onset   • Stroke Mother    • Heart Disease Father        SOCIAL HISTORY  Methamphetamine and marijuana abuse    PHYSICAL EXAM  GENERAL: Alert young male adult tachycardia  VITAL SIGNS: /60   Pulse (!) 104   Temp 36.4 °C (97.5 °F)   Resp (!) 28   Ht 1.829 m (6')   Wt 104.8 kg (231 lb)   SpO2 100%   BMI 31.33 kg/m²    Constitutional: Alert male adult HENT: Scalp is normal size and nontender. Ears are clear. Nose is clear. Throat is clear with no stridor no drooling no trismus. Teeth are all intact.  Eyes: Pupils equal round and reactive to light, extraocular motor fall. There is no scleral icterus.  Neck: Neck is supple and nontender. There is no  meningismus. No adenitis. No thyromegaly.  Lymphatic: No adenopathy.   Cardiovascular: Heart regular rhythm without murmurs or gallops.  Sinus tachycardia rate of 110.  Thorax & Lungs: No chest wall tenderness. Lungs are clear. Patient has good breath sounds bilateral. No rales, no rhonchi, no wheezes.  Abdomen: Abdomen is soft, nontender, not rigid, no guarding, and no organomegaly. There is no palpable hernia   Skin: Warm, pink, and dry with no erythema and no rash.   Back: Nontender, no midline bony tenderness, no flank tenderness.    Extremities: Full range of motion  No tenderness to palpation and no deformities noted. No calf or thigh swelling. No calf or thigh tenderness. No clinical DVT.  Neurologic: Alert & oriented . Cranial nerves are grossly intact as tested. Patient moves all 4 extremities well. Patient has good strong flexion and extension of the ankle joints knee joints hip joints and elbow joints. Sensation is normal and symmetrical in the upper and lower extremities.   Psychiatric: Patient is alert oriented coherent and rational.     EKG  EKG as interpreted by me shows normal sinus rhythm.  No specific ST-T wave change.  Sinus tachycardia.  Rate of 110.  RI interval is normal QRS is normal.    RADIOLOGY/PROCEDURES  DX-CHEST-PORTABLE (1 VIEW)   Final Result      1.  There are continued changes of an enlarged cardiac silhouette with associated pulmonary edema.          COURSE & MEDICAL DECISION MAKING  Patient was just discharged from the hospital today went to the cafeteria and developed chest pain and difficulty breathing.  He arrives here tachycardic.  He has a history of heart failure.  He has a history of methamphetamine abuse and marijuana use.    Plan: #1 IV #2 cardiac monitor, pulse ox monitor, blood pressure monitor.  3.  EKG and chest x-ray #4.  Lab evaluation including CBC, CMP, troponin etc.  5.  Review previous medical records.  Patient be given a bolus of fluids because of the syncopal  episode.    Laboratory and reexamination: Patient has an ejection fraction of less than 30%.  Chest x-ray is normal.  EKG shows no acute change.  I have discussed case with the hospitalist.  The patient stable on admission for evaluation of congestive heart failure and tachycardia and an ejection fraction of less than 30%.  Lactate level is 2.1.  CBC is normal with no anemia no bandemia.  Chemistry panel is unremarkable.  D-dimer is less than 250.    Results for orders placed or performed during the hospital encounter of 07/28/18   CBC WITH DIFFERENTIAL   Result Value Ref Range    WBC 9.9 4.8 - 10.8 K/uL    RBC 4.15 (L) 4.70 - 6.10 M/uL    Hemoglobin 12.4 (L) 14.0 - 18.0 g/dL    Hematocrit 38.5 (L) 42.0 - 52.0 %    MCV 92.8 81.4 - 97.8 fL    MCH 29.9 27.0 - 33.0 pg    MCHC 32.2 (L) 33.7 - 35.3 g/dL    RDW 48.7 35.9 - 50.0 fL    Platelet Count 391 164 - 446 K/uL    MPV 9.8 9.0 - 12.9 fL    Neutrophils-Polys 60.90 44.00 - 72.00 %    Lymphocytes 28.40 22.00 - 41.00 %    Monocytes 6.30 0.00 - 13.40 %    Eosinophils 3.10 0.00 - 6.90 %    Basophils 0.80 0.00 - 1.80 %    Immature Granulocytes 0.50 0.00 - 0.90 %    Nucleated RBC 0.00 /100 WBC    Neutrophils (Absolute) 6.04 1.82 - 7.42 K/uL    Lymphs (Absolute) 2.81 1.00 - 4.80 K/uL    Monos (Absolute) 0.62 0.00 - 0.85 K/uL    Eos (Absolute) 0.31 0.00 - 0.51 K/uL    Baso (Absolute) 0.08 0.00 - 0.12 K/uL    Immature Granulocytes (abs) 0.05 0.00 - 0.11 K/uL    NRBC (Absolute) 0.00 K/uL   COMP METABOLIC PANEL   Result Value Ref Range    Sodium 133 (L) 135 - 145 mmol/L    Potassium 4.3 3.6 - 5.5 mmol/L    Chloride 100 96 - 112 mmol/L    Co2 25 20 - 33 mmol/L    Anion Gap 8.0 0.0 - 11.9    Glucose 172 (H) 65 - 99 mg/dL    Bun 22 8 - 22 mg/dL    Creatinine 1.08 0.50 - 1.40 mg/dL    Calcium 9.1 8.5 - 10.5 mg/dL    AST(SGOT) 20 12 - 45 U/L    ALT(SGPT) 43 2 - 50 U/L    Alkaline Phosphatase 46 30 - 99 U/L    Total Bilirubin 0.6 0.1 - 1.5 mg/dL    Albumin 3.7 3.2 - 4.9 g/dL    Total  Protein 6.6 6.0 - 8.2 g/dL    Globulin 2.9 1.9 - 3.5 g/dL    A-G Ratio 1.3 g/dL   Lactic acid (lactate)   Result Value Ref Range    Lactic Acid 2.1 (H) 0.5 - 2.0 mmol/L   ESTIMATED GFR   Result Value Ref Range    GFR If African American >60 >60 mL/min/1.73 m 2    GFR If Non African American >60 >60 mL/min/1.73 m 2   D-DIMER   Result Value Ref Range    D-Dimer Screen 238 <250 ng/mL(D-DU)   EKG (NOW)   Result Value Ref Range    Report       [facility]    Test Date:  2018  Pt Name:    REINA QIU                Department: ER  MRN:        [id number]                      Room:        22  Gender:     Male                         Technician: [id number]  :        1980                   Requested By:ER TRIAGE PROTOCOL  Order #:    [id number]                    Reading MD: GARY GANSERT, MD    Measurements  Intervals                                Axis  Rate:       102                          P:          47  MA:         160                          QRS:        -24  QRSD:       94                           T:          20  QT:         380  QTc:        496    Interpretive Statements  SINUS TACHYCARDIA  LEFT ATRIAL ABNORMALITY  PROBABLE LEFT VENTRICULAR HYPERTROPHY  BORDERLINE PROLONGED QT INTERVAL  Compared to ECG 2018 01:53:02  T-wave abnormality no longer present    Electronically Signed On 2018 16:48:46 PDT by GARY GANSERT, MD        Patient's blood pressure came up and he did well.  No more fluids were given because of the heart failure.  Stable on admission.  FINAL IMPRESSION  1.  Chest pain and difficulty breathing  2.  Tachycardia 3.  Congestive heart failure with an ejection fraction of less than 30%.      Electronically signed by: Gary Gansert, 2018/ 6 PM

## 2018-07-28 NOTE — DISCHARGE INSTRUCTIONS
Discharge Instructions    Discharged to home by taxi with self. Discharged via wheelchair, hospital escort: Refused.  Special equipment needed: Not Applicable    Be sure to schedule a follow-up appointment with your primary care doctor or any specialists as instructed.     Discharge Plan:   Diet Plan: Discussed  Activity Level: Discussed  Smoking Cessation Offered: Patient Refused  Confirmed Follow up Appointment: Appointment Scheduled  Confirmed Symptoms Management: Discussed  Medication Reconciliation Updated: Yes  Pneumococcal Vaccine Administered/Refused: Not given - Patient refused pneumococcal vaccine  Influenza Vaccine Indication: Patient Refuses    I understand that a diet low in cholesterol, fat, and sodium is recommended for good health. Unless I have been given specific instructions below for another diet, I accept this instruction as my diet prescription.   Other diet: Low sodium diet    Special Instructions:   HF Patient Discharge Instructions  · Monitor your weight daily, and maintain a weight chart, to track your weight changes.   · Activity as tolerated, unless your Doctor has ordered otherwise. Other activity order: as tolerated.  · Follow a low fat, low cholesterol, low salt diet unless instructed otherwise by your Doctor. Read the labels on the back of food products and track your intake of fat, cholesterol and salt.   · Fluid Restriction No. If a Fluid Restriction has been ordered by your Doctor, measure fluids with a measuring cup to ensure that you are not exceeding the restriction.   · No smoking.  · Oxygen No. If your Doctor has ordered that you wear Oxygen at home, it is important to wear it as ordered.  · Did you receive an explanation from staff on the importance of taking each of your medications and why it is necessary to keep taking them unless your doctor says to stop? Yes  · Were all of your questions answered about how to manage your heart failure and what to do if you have increased  signs and symptoms after you go home? Yes  · Do you feel like your heart failure care team involved you in the care treatment plan and allowed you to make decisions regarding your care while in the hospital and addressed any discharge needs you might have? Yes    See the educational handout provided at discharge for more information on monitoring your daily weight, activity and diet. This also explains more about Heart Failure, symptoms of a flare-up and some of the tests that you have undergone.     Warning Signs of a Flare-Up include:  · Swelling in the ankles or lower legs.  · Shortness of breath, while at rest, or while doing normal activities.   · Shortness of breath at night when in bed, or coughing in bed.   · Requiring more pillows to sleep at night, or needing to sit up at night to sleep.  · Feeling weak, dizzy or fatigued.     When to call your Doctor:  · Call [facility] seven days a week from 8:00 a.m. to 8:00 p.m. for medical questions [phone].  · Call your Primary Care Physician or Cardiologist if:   1. You experience any pain radiating to your jaw or neck.  2. You have any difficulty breathing.  3. You experience weight gain of 3 lbs in a day or 5 lbs in a week.   4. You feel any palpitations or irregular heartbeats.  5. You become dizzy or lose consciousness.   If you have had an angiogram or had a pacemaker or AICD placed, and experience:  1. Bleeding, drainage or swelling at the surgical / puncture site.  2. Fever greater than 100.0 F  3. Shock from internal defibrillator.  4. Cool and / or numb extremities.      · Is patient discharged on Warfarin / Coumadin?   No     Depression / Suicide Risk    As you are discharged from this [facility], it is important to learn how to keep safe from harming yourself.    Recognize the warning signs:  · Abrupt changes in personality, positive or negative- including increase in energy   · Giving away possessions  · Change in eating  patterns- significant weight changes-  positive or negative  · Change in sleeping patterns- unable to sleep or sleeping all the time   · Unwillingness or inability to communicate  · Depression  · Unusual sadness, discouragement and loneliness  · Talk of wanting to die  · Neglect of personal appearance   · Rebelliousness- reckless behavior  · Withdrawal from people/activities they love  · Confusion- inability to concentrate     If you or a loved one observes any of these behaviors or has concerns about self-harm, here's what you can do:  · Talk about it- your feelings and reasons for harming yourself  · Remove any means that you might use to hurt yourself (examples: pills, rope, extension cords, firearm)  · Get professional help from the community (Mental Health, Substance Abuse, psychological counseling)  · Do not be alone:Call your Safe Contact- someone whom you trust who will be there for you.  · Call your local CRISIS HOTLINE 321-5032 or 362-547-3144  · Call your local Children's Mobile Crisis Response Team [place] (998) 735-6412 or wwwConformity  · Call the toll free National Suicide Prevention Hotlines   · National Suicide Prevention Lifeline 250-959-TCIQ (6733)  · National Hope Line Network 800-SUICIDE (302-3771)          Heart Failure  Heart failure is a condition in which the heart has trouble pumping blood because it has become weak or stiff. This means that the heart does not pump blood efficiently for the body to work well. For some people with heart failure, fluid may back up into the lungs and there may be swelling (edema) in the lower legs. Heart failure is usually a long-term (chronic) condition. It is important for you to take good care of yourself and follow the treatment plan from your health care provider.  What are the causes?  This condition is caused by some health problems, including:  · High blood pressure (hypertension). Hypertension causes the heart muscle to work harder than  normal. High blood pressure eventually causes the heart to become stiff and weak.  · Coronary artery disease (CAD). CAD is the buildup of cholesterol and fat (plaques) in the arteries of the heart.  · Heart attack (myocardial infarction). Injured tissue, which is caused by the heart attack, does not contract as well and the heart's ability to pump blood is weakened.  · Abnormal heart valves. When the heart valves do not open and close properly, the heart muscle must pump harder to keep the blood flowing.  · Heart muscle disease (cardiomyopathy or myocarditis). Heart muscle disease is damage to the heart muscle from a variety of causes, such as drug or alcohol abuse, infections, or unknown causes. These can increase the risk of heart failure.  · Lung disease. When the lungs do not work properly, the heart must work harder.  What increases the risk?  Risk of heart failure increases as a person ages. This condition is also more likely to develop in people who:  · Are overweight.  · Are male.  · Smoke or chew tobacco.  · Abuse alcohol or illegal drugs.  · Have taken medicines that can damage the heart, such as chemotherapy drugs.  · Have diabetes.  ¨ High blood sugar (glucose) is associated with high fat (lipid) levels in the blood.  ¨ Diabetes can also damage tiny blood vessels that carry nutrients to the heart muscle.  · Have abnormal heart rhythms.  · Have thyroid problems.  · Have low blood counts (anemia).  What are the signs or symptoms?  Symptoms of this condition include:  · Shortness of breath with activity, such as when climbing stairs.  · Persistent cough.  · Swelling of the feet, ankles, legs, or abdomen.  · Unexplained weight gain.  · Difficulty breathing when lying flat (orthopnea).  · Waking from sleep because of the need to sit up and get more air.  · Rapid heartbeat.  · Fatigue and loss of energy.  · Feeling light-headed, dizzy, or close to fainting.  · Loss of appetite.  · Nausea.  · Increased  urination during the night (nocturia).  · Confusion.  How is this diagnosed?  This condition is diagnosed based on:  · Medical history, symptoms, and a physical exam.  · Diagnostic tests, which may include:  ¨ Echocardiogram.  ¨ Electrocardiogram (ECG).  ¨ Chest X-ray.  ¨ Blood tests.  ¨ Exercise stress test.  ¨ Radionuclide scans.  ¨ Cardiac catheterization and angiogram.  How is this treated?  Treatment for this condition is aimed at managing the symptoms of heart failure. Medicines, behavioral changes, or other treatments may be necessary to treat heart failure.  Medicines   These may include:  · Angiotensin-converting enzyme (ACE) inhibitors. This type of medicine blocks the effects of a blood protein called angiotensin-converting enzyme. ACE inhibitors relax (dilate) the blood vessels and help to lower blood pressure.  · Angiotensin receptor blockers (ARBs). This type of medicine blocks the actions of a blood protein called angiotensin. ARBs dilate the blood vessels and help to lower blood pressure.  · Water pills (diuretics). Diuretics cause the kidneys to remove salt and water from the blood. The extra fluid is removed through urination, leaving a lower volume of blood that the heart has to pump.  · Beta blockers. These improve heart muscle strength and they prevent the heart from beating too quickly.  · Digoxin. This increases the force of the heartbeat.  Healthy behavior changes   These may include:  · Reaching and maintaining a healthy weight.  · Stopping smoking or chewing tobacco.  · Eating heart-healthy foods.  · Limiting or avoiding alcohol.  · Stopping use of street drugs (illegal drugs).  · Physical activity.  Other treatments   These may include:  · Surgery to open blocked coronary arteries or repair damaged heart valves.  · Placement of a biventricular pacemaker to improve heart muscle function (cardiac resynchronization therapy). This device paces both the right ventricle and left  ventricle.  · Placement of a device to treat serious abnormal heart rhythms (implantable cardioverter defibrillator, or ICD).  · Placement of a device to improve the pumping ability of the heart (left ventricular assist device, or LVAD).  · Heart transplant. This can cure heart failure, and it is considered for certain patients who do not improve with other therapies.  Follow these instructions at home:  Medicines  · Take over-the-counter and prescription medicines only as told by your health care provider. Medicines are important in reducing the workload of your heart, slowing the progression of heart failure, and improving your symptoms.  ¨ Do not stop taking your medicine unless your health care provider told you to do that.  ¨ Do not skip any dose of medicine.  ¨ Refill your prescriptions before you run out of medicine. You need your medicines every day.  Eating and drinking  · Eat heart-healthy foods. Talk with a dietitian to make an eating plan that is right for you.  ¨ Choose foods that contain no trans fat and are low in saturated fat and cholesterol. Healthy choices include fresh or frozen fruits and vegetables, fish, lean meats, legumes, fat-free or low-fat dairy products, and whole-grain or high-fiber foods.  ¨ Limit salt (sodium) if directed by your health care provider. Sodium restriction may reduce symptoms of heart failure. Ask a dietitian to recommend heart-healthy seasonings.  ¨ Use healthy cooking methods instead of frying. Healthy methods include roasting, grilling, broiling, baking, poaching, steaming, and stir-frying.  · Limit your fluid intake if directed by your health care provider. Fluid restriction may reduce symptoms of heart failure.  Lifestyle  · Stop smoking or using chewing tobacco. Nicotine and tobacco can damage your heart and your blood vessels. Do not use nicotine gum or patches before talking to your health care provider.  · Limit alcohol intake to no more than 1 drink per day for  non-pregnant women and 2 drinks per day for men. One drink equals 12 oz of beer, 5 oz of wine, or 1½ oz of hard liquor.  ¨ Drinking more than that is harmful to your heart. Tell your health care provider if you drink alcohol several times a week.  ¨ Talk with your health care provider about whether any level of alcohol use is safe for you.  ¨ If your heart has already been damaged by alcohol or you have severe heart failure, drinking alcohol should be stopped completely.  · Stop use of illegal drugs.  · Lose weight if directed by your health care provider. Weight loss may reduce symptoms of heart failure.  · Do moderate physical activity if directed by your health care provider. People who are elderly and people with severe heart failure should consult with a health care provider for physical activity recommendations.  Monitor important information  · Weigh yourself every day. Keeping track of your weight daily helps you to notice excess fluid sooner.  ¨ Weigh yourself every morning after you urinate and before you eat breakfast.  ¨ Wear the same amount of clothing each time you weigh yourself.  ¨ Record your daily weight. Provide your health care provider with your weight record.  · Monitor and record your blood pressure as told by your health care provider.  · Check your pulse as told by your health care provider.  Dealing with extreme temperatures  · If the weather is extremely hot:  ¨ Avoid vigorous physical activity.  ¨ Use air conditioning or fans or seek a cooler location.  ¨ Avoid caffeine and alcohol.  ¨ Wear loose-fitting, lightweight, and light-colored clothing.  · If the weather is extremely cold:  ¨ Avoid vigorous physical activity.  ¨ Layer your clothes.  ¨ Wear mittens or gloves, a hat, and a scarf when you go outside.  ¨ Avoid alcohol.  General instructions  · Manage other health conditions such as hypertension, diabetes, thyroid disease, or abnormal heart rhythms as told by your health care  provider.  · Learn to manage stress. If you need help to do this, ask your health care provider.  · Plan rest periods when fatigued.  · Get ongoing education and support as needed.  · Participate in or seek rehabilitation as needed to maintain or improve independence and quality of life.  · Stay up to date with immunizations. Keeping current on pneumococcal and influenza immunizations is especially important to prevent respiratory infections.  · Keep all follow-up visits as told by your health care provider. This is important.  Contact a health care provider if:  · You have a rapid weight gain.  · You have increasing shortness of breath that is unusual for you.  · You are unable to participate in your usual physical activities.  · You tire easily.  · You cough more than normal, especially with physical activity.  · You have any swelling or more swelling in areas such as your hands, feet, ankles, or abdomen.  · You are unable to sleep because it is hard to breathe.  · You feel like your heart is beating quickly (palpitations).  · You become dizzy or light-headed when you stand up.  Get help right away if:  · You have difficulty breathing.  · You notice or your family notices a change in your awareness, such as having trouble staying awake or having difficulty with concentration.  · You have pain or discomfort in your chest.  · You have an episode of fainting (syncope).  This information is not intended to replace advice given to you by your health care provider. Make sure you discuss any questions you have with your health care provider.  Document Released: 12/18/2006 Document Revised: 08/22/2017 Document Reviewed: 07/12/2017  [employer] Interactive Patient Education © 2017 [employer]

## 2018-07-28 NOTE — PROGRESS NOTES
"Patient discharged to home. Hospitalist aware of patient's low EF hx. Orders for discharge, patient educated to be compliant with his medications and provided prescription for anxiety medication. Patient anxious to leave, but refuses assistance with wheel chair. Patient states \" I will just walk outside.\" Tele monitor and IVs removed. All belongings with patient.   "

## 2018-07-28 NOTE — DISCHARGE SUMMARY
Discharge Summary    CHIEF COMPLAINT ON ADMISSION  Chief Complaint   Patient presents with   • Shortness of Breath   • Chest Pain       Reason for Admission  Shortness of breath     Admission Date  7/27/2018    CODE STATUS  Full Code    HPI & HOSPITAL COURSE  This is a 38 y.o. male here with PMHx of chronic systolic/diastolic heart failure due to polysubstance abuse was admitted to the hospital for acute on chronic systolic/diastolic heart failure with acute respiratory failure with hypoxia due to medical and dietary noncompliance.  Patient was initially started on O2 supplementation and IV Lasix for diuresis.  As patient continue to diurese, he was able to be weaned off O2 supplementation.  Patient was restarted on supposed home cardiac regimen and remained stable.  Patient was educated on the importance of maintaining medical and dietary compliance in addition to polysubstance cessation counseling. Patient also was having anxiety durign hospital course which was managed with prn Xanax and will be given 1 week's worth of medication until he follows up with PCP in one week.  Heart failure clinic appointment has also been scheduled.  Importance of medical follow up was stressed to patient prior to discharge.       Therefore, he is discharged in fair and stable condition to home with close outpatient follow-up.    The patient recovered much more quickly than anticipated on admission.    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 *      FOLLOW UP  Future Appointments  Date Time Provider Department Center    7/31/2018 2:00 PM MYRANDA Bah. [facility] None   8/10/2018 10:50 AM Destini Lee M.D. [facility]     No follow-up provider specified.    MEDICATIONS ON DISCHARGE     Medication List      START taking these medications      Instructions   ALPRAZolam 0.5 MG Tabs  Commonly known as:  XANAX   Take 1 Tab by mouth 3 times a day as needed for Anxiety for up to 7 days.  Dose:  0.5 mg        CONTINUE taking these medications      Instructions   aspirin 81 MG Chew chewable tablet  Commonly known as:  ASA   Take 1 Tab by mouth every day.  Dose:  81 mg     carvedilol 12.5 MG Tabs  Commonly known as:  COREG   Take 1 Tab by mouth 2 times a day, with meals.  Dose:  12.5 mg     lisinopril 5 MG Tabs  Commonly known as:  PRINIVIL   Take 1 Tab by mouth every day.  Dose:  5 mg     spironolactone 25 MG Tabs  Commonly known as:  ALDACTONE   Take 1 Tab by mouth every day.  Dose:  25 mg            Allergies  No Known Allergies    DIET  Orders Placed This Encounter   Procedures   • Diet Order 2 Gram Sodium     Standing Status:   Standing     Number of Occurrences:   1     Order Specific Question:   Diet:     Answer:   2 Gram Sodium [7]       ACTIVITY  As tolerated.  Weight bearing as tolerated    CONSULTATIONS  NONE    PROCEDURES  NONE    LABORATORY  Lab Results   Component Value Date    SODIUM 138 07/28/2018    POTASSIUM 4.4 07/28/2018    CHLORIDE 104 07/28/2018    CO2 25 07/28/2018    GLUCOSE 117 (H) 07/28/2018    BUN 21 07/28/2018    CREATININE 0.89 07/28/2018    CREATININE 0.9 02/17/2008        Lab Results   Component Value Date    WBC 10.9 (H) 07/28/2018    HEMOGLOBIN 12.2 (L) 07/28/2018    HEMATOCRIT 38.2 (L) 07/28/2018    PLATELETCT 378 07/28/2018        Total time of the discharge process exceeds 46 minutes.

## 2018-07-28 NOTE — PROGRESS NOTES
Pt expressing increasing anxiety. Pt requested to go outside for a cigarette and fresh air. Pt educated that he is not allowed to go outside related to his tele monitor or smoke a cigarette. Pt educated that RN will call MD regarding nicotine patch and gum.    Pt attempted to walk around unit with this RN. Pt stated he felt short of breath after a few steps and wanted to go back to his room. O2sat checked, 91% on RA. RN offered to wheel pt around unit in wheelchair. 1L NC applied for pt comfort. 5 laps completed. Pt brought back to room to try and get some sleep.

## 2018-07-28 NOTE — CODE DOCUMENTATION
Rapid response called to the cafeteria. Being charted as a code blue since patient was technically discharged and therefor considered a visitor.    Patient found responsive by RRT, sitting up in a chair, with discharge paperwork in hand. He was in the cafeteria when physicians lunching nearby noticed he was slumped in his chair with difficulty breathing, rapid response was called. Patient had just been discharged from T818 with CHF. RRT responded, put the patient on a gurney and hooked him up to 2L/min oxygen and a monitor, and transported him to the ED with 3x ICU RNs and RT. ED charge notified of patient impending arrival. Vital signs stable en route. Admitted into [facility], ED nurse at the bedside.     Patient was still admitted into T[facility] in the computer despite being discharged from the unit. Tele 8 charge Ramses notified and ED charge notified. Per NAM, discharging MD needs to decide if patient returns to T818 or if patient is to be seen in the ED. RRT signing off to ED RN.

## 2018-07-28 NOTE — CARE PLAN
Problem: Safety  Goal: Will remain free from injury  Outcome: PROGRESSING AS EXPECTED  Bed locked and in lowest position.  Bed alarm on.  Treaded socks.  Call light and belongings with in reach.  Pt educated to call for assistance. Pt verbalized understanding.  Hourly rounding in place.     Problem: Knowledge Deficit  Goal: Knowledge of disease process/condition, treatment plan, diagnostic tests, and medications will improve  Outcome: PROGRESSING AS EXPECTED  Discussed POC and medications with patient.  Patient verbalized understanding.

## 2018-07-28 NOTE — RESPIRATORY CARE
COPD EDUCATION by COPD CLINICAL EDUCATOR  7/28/2018 at 6:22 AM by Cherelle Thakur     Patient reviewed by COPD education team. Patient does not qualify for COPD program.

## 2018-07-28 NOTE — ED NOTES
Pt was found in the cafeteria after being dc'd from tele. Pt was short of breath, staggering around, and appeared confused. Rapid response called. Pt brought to ER.

## 2018-07-28 NOTE — ED NOTES
Pt yelling that he cant breath, pt educated that his O2 saturations are good at this time. Pt assisted to reposition.

## 2018-07-28 NOTE — PROGRESS NOTES
Pt complaining of increasing anxiety and wanting a cigarette.  MD called and updated.  Orders for Seroquel 50 mg and nicotine patch 14 mg were received.

## 2018-07-28 NOTE — PROGRESS NOTES
Bedside report received. PT is sitting up in bed and anxious. Patient A&O x 4 on RA.  Complains of 8/10 abdominal pain, will medicate per MAR.  POC discussed with patient.  Patient verbalized understanding.  Call light and belongings with in reach.  Bed locked and in lowest position, alarm and fall precautions in place.

## 2018-07-29 PROBLEM — I50.22 CHRONIC SYSTOLIC (CONGESTIVE) HEART FAILURE (HCC): Status: ACTIVE | Noted: 2018-01-01

## 2018-07-29 PROBLEM — F19.10 POLYSUBSTANCE ABUSE (HCC): Status: ACTIVE | Noted: 2018-01-01

## 2018-07-29 PROBLEM — E87.1 HYPONATREMIA: Status: ACTIVE | Noted: 2018-01-01

## 2018-07-29 PROBLEM — F41.1 GENERALIZED ANXIETY DISORDER: Status: ACTIVE | Noted: 2018-01-01

## 2018-07-29 PROBLEM — G93.40 ENCEPHALOPATHY: Status: ACTIVE | Noted: 2018-01-01

## 2018-07-29 PROBLEM — R00.0 SINUS TACHYCARDIA: Status: ACTIVE | Noted: 2018-01-01

## 2018-07-29 NOTE — ED NOTES
[place] fall assessment completed. No fall risk identified. Personal needs and safety assessed.

## 2018-07-29 NOTE — ED NOTES
Pt discharge from hospital today. Pt did not leave the facility .  Med rec updated and complete.

## 2018-07-29 NOTE — RESPIRATORY CARE
COPD EDUCATION by COPD CLINICAL EDUCATOR  7/29/2018 at 7:33 AM by Emily Mclean     Patient reviewed by COPD education team. Patient does not qualify for COPD program.

## 2018-07-29 NOTE — PROGRESS NOTES
Attempted to do stroke education for patient; signs and symptoms of stroke explained; no verbal confirmation of teaching/refused teaching

## 2018-07-29 NOTE — ED NOTES
Pt refusing to wear monitoring equipment, pt educated about the importance of the monitor, continues to refuse.

## 2018-07-29 NOTE — PROGRESS NOTES
Patient uncooperative. Patient refusing to stay in bed. Patient ignoring nurses request to use call bell or stay in bed. Patient impulsive and using frequent profanities.    Patient refusing meds and some parts of assessment.     Once again, patient refusing to follow fall precautions such as using call bell and wearing non slip socks. Patient remains agitated despite use of PRN medications    Charge nurse notified.    Charting completed.     Will continue to monitor.

## 2018-07-29 NOTE — ED NOTES
"Pt stating \"Im having a severe anxiety attack,\" Dr. Ordoñez notified, no new orders received. Pt encouraged to take slow deep breaths and other calming techniques attempted.   "

## 2018-07-29 NOTE — PROGRESS NOTES
Chart reviewed.  Pt was discharged from [facility] 7/28/18 and readmitted to [facility] 7/28/18.  Pt is currently admitted to [facility] and does not qualify for discharge outreach phone call at this time.

## 2018-07-29 NOTE — PROGRESS NOTES
2 RN skin check done with CLEMENTE Powers. Patient has generalized scabs on abdomen and scattered bruises to his lower abdomen; blanchable redness to bilateral elbows; all other skin areas and bony prominences checked and free of any irregularities.

## 2018-07-29 NOTE — CARE PLAN
Problem: Safety  Goal: Will remain free from falls    Intervention: Implement fall precautions   07/28/18 2100 07/29/18 0101   OTHER   Environmental Precautions Treaded Slipper Socks on Patient;Personal Belongings, Wastebasket, Call Bell etc. in Easy Reach;Report Given to Other Health Care Providers Regarding Fall Risk;Bed in Low Position;Communication Sign for Patients & Families;Mobility Assessed & Appropriate Sign Placed --    IV Pole on Same Side of Bed as Bathroom --  Yes   Bedrails --  Bedrails Closest to Bathroom Down   Chair/Bed Strip Alarm --  Yes - Alarm On   Bed Alarm --  Yes - Alarm On   Strip alarm for chair also set up      Problem: Knowledge Deficit  Goal: Knowledge of disease process/condition, treatment plan, diagnostic tests, and medications will improve    Intervention: Explain information regarding disease process/condition, treatment plan, diagnostic tests, and medications and document in education  Plan of care, disease process information, medications, tests, orientation to floor, phone, and call light, continuous heart monitoring; verbalized understanding but needs reinforcement

## 2018-07-29 NOTE — PROGRESS NOTES
Bedside shift report received from night RN, pt not co opoarating  To complete NIH stroke scale. Pt wont leave oxygen on,  States he just wants to sleep and wants medication to sleep. Explained to pt he has already been medicated and pt very upset. Safety and fall precautions in place at this time,call light in reach, will continue to monitor

## 2018-07-29 NOTE — H&P
Hospital Medicine History & Physical Note    Date of Service  07/28/2018    Primary Care Physician  Pcp Pt States None    Consultants  Cardiology     Code Status  FULL CODE    Chief Complaint  Encephaloapthy           History of Presenting Illness  38 y.o. male who presented 7/28/2018 with confusion.  Patient with history of chronic systolic congestive heart failure.  He was admitted to the hospital on July 27, 2018 with shortness of breath.  Noted to be noncompliant with medical recommendations and dietary recommendations.  Ongoing polysubstance abuse.  During his hospital course patient was weaned off oxygen therapy and he was restarted on his home cardiac regimen and consult and educated.  He was given Xanax for anxiety.  1 week of medications prescribed and advised follow-up with primary care physician and heart failure clinic.  Following discharge patient was found in the cafeteria short of breath, standing around and appearing confused and goofy.  He was brought to the emergency room after rapid response was called.  In the emergency room patient received Ativan and I was consulted for needs for inpatient admission.  Upon evaluation by me this patient is now more goofy and somnolent.  He is not interested in providing me history of presenting illness and reports that he has a lot of anxiety and is requesting benzodiazepines to be given to him.  He does not specify that why he has so much anxiety.  He reports that he has nausea.  He is aware that he is in the hospital but will not answer further questions of orientation.  Per nursing staff, abnormal behavior and noncompliance.  Per emergency room physician, was concern for tachycardia, near syncope, shortness of breath, chest pain.  In asking these questions, he reported no as answers.  He is not engaging in his history of presenting illness with me.  I am uncertain if this is truly an underlying encephalopathic state, nonparticipation, related to the use of  Ativan or other concerns.  I discussed with Dr. Navarro from cardiology.    Review of Systems  Review of Systems   Unable to perform ROS: Mental acuity      Past Medical History   has a past medical history of Congestive heart failure (HCC) and Gastritis.    Surgical History  Negative no prior surgeries    Family History  family history includes Heart Disease in his father; Stroke in his mother.     Social History   reports that he has been smoking Cigarettes.  He has been smoking about 0.07 packs per day. He has never used smokeless tobacco. He reports that he drinks alcohol. He reports that he uses drugs.    Allergies  No Known Allergies    Medications  Prior to Admission Medications   Prescriptions Last Dose Informant Patient Reported? Taking?   ALPRAZolam (XANAX) 0.5 MG Tab new at script Historical No No   Sig: Take 1 Tab by mouth 3 times a day as needed for Anxiety for up to 7 days.   aspirin (ASA) 81 MG Chew Tab chewable tablet new at script Historical No No   Sig: Take 1 Tab by mouth every day.   carvedilol (COREG) 12.5 MG Tab new at script Historical No No   Sig: Take 1 Tab by mouth 2 times a day, with meals.   lisinopril (PRINIVIL) 5 MG Tab new at script Historical No No   Sig: Take 1 Tab by mouth every day.   spironolactone (ALDACTONE) 25 MG Tab new at script Historical No No   Sig: Take 1 Tab by mouth every day.      Facility-Administered Medications: None       Physical Exam  Blood Pressure: 114/81   Temperature: 36.2 °C (97.1 °F)   Pulse: (!) 103   Respiration: 20   Pulse Oximetry: 90 % (O2 saturation up and down. Respirations too)     Physical Exam   Constitutional: He appears well-developed and well-nourished. No distress.   HENT:   Head: Normocephalic.   Mouth/Throat: Oropharynx is clear and moist. No oropharyngeal exudate.   Eyes: Pupils are equal, round, and reactive to light. Conjunctivae and EOM are normal. No scleral icterus.   Neck: JVD present. No thyromegaly present.   Cardiovascular: Normal  rate, regular rhythm and normal heart sounds.  Exam reveals no gallop and no friction rub.    No murmur heard.  Pulses:       Posterior tibial pulses are 2+ on the right side, and 2+ on the left side.   Cap refill < 3s   Pulmonary/Chest: No stridor. No respiratory distress. He has no wheezes. He has rales.   Abdominal: Soft. Bowel sounds are normal. He exhibits no distension. There is no tenderness. There is no rebound and no guarding.   Musculoskeletal: He exhibits edema (minimal). He exhibits no tenderness or deformity.   Neurological: He is alert. No cranial nerve deficit.   Skin: Skin is warm and dry. He is not diaphoretic.   Psychiatric: His affect is inappropriate. His speech is delayed. He is slowed. He expresses impulsivity.       Laboratory:  Recent Labs      07/27/18 0146 07/28/18   0512 07/28/18   1525   WBC  13.3*  10.9*  9.9   RBC  4.08*  4.13*  4.15*   HEMOGLOBIN  11.8*  12.2*  12.4*   HEMATOCRIT  38.2*  38.2*  38.5*   MCV  93.6  92.5  92.8   MCH  28.9  29.5  29.9   MCHC  30.9*  31.9*  32.2*   RDW  49.3  48.6  48.7   PLATELETCT  359  378  391   MPV  10.2  10.3  9.8     Recent Labs      07/27/18 0146 07/28/18   0513  07/28/18   1525   SODIUM  138  138  133*   POTASSIUM  4.0  4.4  4.3   CHLORIDE  106  104  100   CO2  20  25  25   GLUCOSE  203*  117*  172*   BUN  15  21  22   CREATININE  0.70  0.89  1.08   CALCIUM  8.8  9.0  9.1     Recent Labs      07/27/18   0146  07/28/18   0513  07/28/18   1525   ALTSGPT  54*   --   43   ASTSGOT  18   --   20   ALKPHOSPHAT  47   --   46   TBILIRUBIN  0.5   --   0.6   LIPASE  38   --    --    GLUCOSE  203*  117*  172*     Recent Labs      07/28/18   1525   APTT  29.3   INR  1.15*     Recent Labs      07/27/18   0146   BNPBTYPENAT  640*         Lab Results   Component Value Date    TROPONINI 0.02 07/28/2018       Urinalysis:    Lab Results   Component Value Date    SPECGRAVITY 1.024 07/28/2018    GLUCOSEUR Negative 07/28/2018    KETONES Negative 07/28/2018     NITRITE Negative 07/28/2018        Imaging:  ECHOCARDIOGRAM LTD W/ CONT   Final Result      CT-CTA NECK WITH & W/O-POST PROCESSING   Final Result      1.  CT angiogram of the neck demonstrating no evidence of hemodynamically significant carotid artery stenosis or occlusion.   2.  There are redundant and looped proximal internal carotid arteries bilaterally but they are patent.   3.  Bilateral groundglass opacities in the lung apices could be due to changes of pulmonary edema or pneumonia.      CT-CTA HEAD WITH & W/O-POST PROCESS   Final Result      1.  CT angiogram of the [place] within normal limits.      CT-CEREBRAL PERFUSION ANALYSIS   Final Result      1.  CT perfusion examination over the limited section of brain reveals 0 mL of brain parenchyma has less than 30% of cerebral blood flow (CBF).      2.  Please note that the cerebral perfusion was performed on the limited brain tissue around the basal ganglia region. Infarct/ischemia outside the CT perfusion sections can be missed in this study.      CT-HEAD W/O   Final Result      1.  Head CT without contrast within normal limits. No evidence of acute cerebral infarction, hemorrhage or mass lesion.      DX-CHEST-PORTABLE (1 VIEW)   Final Result      1.  There are continued changes of an enlarged cardiac silhouette with associated pulmonary edema.      MR-BRAIN-W/O    (Results Pending)         Assessment/Plan:  I anticipate this patient is appropriate for observation status at this time.    Encephalopathy- (present on admission)   Assessment & Plan    Unclear, patient would not provide details  Remains a poor historian upon my evaluation, received Ativan  Does not provide me any further details, appears and pretends to be goofy  Underlying systolic congestive heart failure, risk of mural thrombus and stroke  Obtain a limited echocardiogram with contrast, Dr. Navarro from cardiology notified  Stroke protocol, obtain MRI brain  Obtain EEG  ?  Arrhythmia  with congestive heart failure, cardiology to evaluate        Chronic systolic (congestive) heart failure (HCC)- (present on admission)   Assessment & Plan    Uncertain if there is an acute component or not  IV Lasix 20 mg twice daily  For now holding heart failure regimen and to stroke is ruled out to allow for permissive hypertension  Dr. Navarro from cardiology team was notified by me  We will await formal cardiology consultation        Sinus tachycardia- (present on admission)   Assessment & Plan    Monitor on telemetry        Generalized anxiety disorder- (present on admission)   Assessment & Plan    Continue home Xanax        Polysubstance abuse- (present on admission)   Assessment & Plan    Methamphetamine and cannabinoids        Hyponatremia- (present on admission)   Assessment & Plan    Mild assess response to IV diuresis        Obesity (BMI 35.0-39.9 without comorbidity)- (present on admission)   Assessment & Plan    Body mass index is 31.13 kg/m².            VTE prophylaxis: SCD

## 2018-07-29 NOTE — ASSESSMENT & PLAN NOTE
- refusing Fluoxetine; starting Seroquel per pt request  - prn Xanax on board  - uncontrolled; will obtain Psych consult

## 2018-07-29 NOTE — PROGRESS NOTES
Patient impulsive and very anxious; spells of dyspnea and hyperventilation; unable to get comfortable, shifty in bed and transferring from bed to chair every 15-20 min. Patient is complaining of anxiety, shortness of breath, and unable to sleep, yelling for seroquel. Paged for Hospitalist on call at [phone], [phone], and 0312; patched through directly at [phone]. Spoke with Dr. Harden regarding patient's status, request for seroquel, and made aware of xanax dose and frequency to see if it can be increased in dose and/or frequency. Order for one extra dose of xanax given. Orders noted and carried out.

## 2018-07-29 NOTE — PROGRESS NOTES
Patient transferred from chair back to bed; on and off dyspnea. Breathing techniques, relaxation techniques re-education, fall prevention, and use of call light. No further needs at this time. Bed is locked and in lowest position. Side rails up x 2. Strip alarm is on and use. Call light and phone left within reach. Will continue to monitor.

## 2018-07-29 NOTE — CONSULTS
Patient seen in the emergency room yesterday after discussing with Dr. Ordoñez.  Vital signs stable, he chooses not to speak to me.    According to the chart and staff, nearly immediately after discharge yesterday he was found in the cafeteria acting strangely.  Tox screen negative for meth and alcohol    Echo done early yesterday read by me, echo done later yesterday with echo contrast also read by me.  Both show chronic systolic heart failure that is severe, both show no evidence of left ventricular thrombus.    This admission does not represent an exacerbation of this chronic disease state.    No evidence of cardiac etiology of current confusion/delirium/non-interest in care.  He certainly will need to choose to care for his heart disease and abstain from multi-substance drug abuse and alcohol.  He has a standing follow-up in our office.  No additional recommendations at this point    Do not hesitate to reach out today if you have concerns.

## 2018-07-29 NOTE — ASSESSMENT & PLAN NOTE
- cont outpt cardiac meds per Cards recommendation  - s/p limited ECHO in ED read by Cardiology with no changes  - cont IV Lasix BID

## 2018-07-29 NOTE — PROGRESS NOTES
Hospital Medicine Interval Note  Date of Service:  7/29/2018    Chief Complaint  38 y.o.-year-old male admitted 7/28/2018 with acute encephalopathy most likely due to acute anxiety.      Interval Problem Update  Pt states that he just wants to sleep.    Consultants/Specialty  Cardiology    Disposition  Home when medically stable     Review of Systems   Constitutional: Negative.    HENT: Negative.    Eyes: Negative.    Respiratory: Negative.    Cardiovascular: Negative.    Gastrointestinal: Negative.    Musculoskeletal: Negative.    Skin: Negative.    Neurological: Negative.    Psychiatric/Behavioral: The patient is nervous/anxious and has insomnia.       Physical Exam Laboratory/Imaging   Vitals:    07/28/18 2053 07/28/18 2100 07/28/18 2349 07/29/18 0400   BP:  106/65 114/81 127/91   Pulse: (!) 110 (!) 107 (!) 103 69   Resp: (!) 22 16 20 20   Temp:  36.1 °C (97 °F) 36.2 °C (97.1 °F) 36.7 °C (98.1 °F)   SpO2: 99% 90% 90% 90%   Weight:  104.1 kg (229 lb 8 oz)     Height:         Physical Exam   Constitutional: He is oriented to person, place, and time. He appears well-developed and well-nourished. No distress.   HENT:   Head: Normocephalic and atraumatic.   Mouth/Throat: No oropharyngeal exudate.   Eyes: Pupils are equal, round, and reactive to light. Conjunctivae and EOM are normal. No scleral icterus.   Neck: Normal range of motion. Neck supple.   Cardiovascular: Exam reveals no gallop and no friction rub.    No murmur heard.  Pulmonary/Chest: Effort normal and breath sounds normal.   Abdominal: Soft. Bowel sounds are normal.   Musculoskeletal: Normal range of motion.   Neurological: He is alert and oriented to person, place, and time. He has normal reflexes.   Skin: Skin is warm and dry.   Psychiatric: His mood appears anxious. He is agitated.   Nursing note and vitals reviewed.   Lab Results   Component Value Date/Time    WBC 10.8 07/29/2018 01:46 AM    HEMOGLOBIN 12.5 (L) 07/29/2018 01:46 AM    HEMATOCRIT 38.7  (L) 07/29/2018 01:46 AM    PLATELETCT 395 07/29/2018 01:46 AM     Lab Results   Component Value Date/Time    SODIUM 136 07/29/2018 01:46 AM    POTASSIUM 4.4 07/29/2018 01:46 AM    CHLORIDE 101 07/29/2018 01:46 AM    CO2 25 07/29/2018 01:46 AM    GLUCOSE 126 (H) 07/29/2018 01:46 AM    BUN 20 07/29/2018 01:46 AM    CREATININE 0.91 07/29/2018 01:46 AM    CREATININE 0.9 02/17/2008 06:58 AM      Assessment/Plan    * Encephalopathy- (present on admission)   Assessment & Plan    - most likely due to medication/Ativan use v. Undiagnosed generalized anxiety disorder  - CVA workup unremarkable so far; EEG pending, MRI brain pending  - Cardiac workup and Cardiology evaluation noted; will resume cardiac meds when CVA ruled out  - refusing Fluoxetine and asking for Seroquel; will give, cont prn Ativan and monitor        Chronic systolic (congestive) heart failure (HCC)- (present on admission)   Assessment & Plan    - cont outpt cardiac meds per Cards recommendation once CVA ruled out  - s/p limited ECHO in ED read by Cardiology with no changes  - cont IV Lasix BID        Sinus tachycardia- (present on admission)   Assessment & Plan    Monitor on telemetry        Generalized anxiety disorder- (present on admission)   Assessment & Plan    - refusing Fluoxetine; starting Seroquel per pt request  - prn Xanax on board        Polysubstance abuse- (present on admission)   Assessment & Plan    Methamphetamine and cannabinoids noted from [facility]  - cessation counseling provided        Hyponatremia- (present on admission)   Assessment & Plan    - resolved; monitoring        Obesity (BMI 35.0-39.9 without comorbidity)- (present on admission)   Assessment & Plan    Body mass index is 31.13 kg/m².           Quality-Core Measures

## 2018-07-29 NOTE — ED NOTES
Pt has pulled off cords and monitor wires. Pt requesting food, informed pt of NPO status until otherwise notified by provider.

## 2018-07-29 NOTE — ASSESSMENT & PLAN NOTE
- most likely due to medication/Ativan use v. Undiagnosed generalized anxiety disorder  - CVA workup unremarkable; EEG pending, MRI brain unremarkable  - Cardiac workup and Cardiology evaluation noted; will resume cardiac meds  - refusing Fluoxetine and asking for Seroquel; cont prn Ativan and monitor

## 2018-07-30 NOTE — PROGRESS NOTES
Hospital Medicine Interval Note  Date of Service:  7/30/2018    Chief Complaint  38 y.o.-year-old male admitted 7/28/2018 with acute encephalopathy most likely due to acute anxiety.  Please see D/C summary (7/28) for details.  Patient has history of severe chronic systolic/diastolic heart failure with EF of 20% due to polysubstance abuse and noncompliant with medical management (abstaining from polysubstance abuse and taking cardiac meds).  After discharge, patient was found in the hospital cafeteria with SOB and rapid response was called.  Patient was transferred to the ED and subsequently admitted for above.  Cardiology was consulted and performed bedside ECHO which revealed no acute changes.  Cards recommended medical management.  Psych has been consulted for management of anxiety as this appears to be major component of acute dyspnea while saturating well on room air.     Interval Problem Update  Pt states he gets SOB with minimal exertion (walking to the bathroom) but maintains normal saturations while on RA during exertion.     Consultants/Specialty  Cardiology  Psychiatry    Disposition  Home when medically stable     Review of Systems   Constitutional: Negative.    HENT: Negative.    Eyes: Negative.    Respiratory: Positive for shortness of breath.    Cardiovascular: Negative.    Gastrointestinal: Negative.    Musculoskeletal: Negative.    Skin: Negative.    Neurological: Negative.    Psychiatric/Behavioral: The patient is nervous/anxious and has insomnia.       Physical Exam Laboratory/Imaging   Vitals:    07/30/18 0146 07/30/18 0400 07/30/18 0526 07/30/18 0800   BP: 116/97 120/84 102/87 112/83   Pulse: 90 (!) 102 (!) 109 76   Resp:  20  16   Temp:  36.5 °C (97.7 °F)  36.1 °C (97 °F)   SpO2: 96% 98%  98%   Weight:       Height:         Physical Exam   Constitutional: He is oriented to person, place, and time. He appears well-developed and well-nourished. No distress.   HENT:   Head: Normocephalic and  atraumatic.   Mouth/Throat: No oropharyngeal exudate.   Eyes: Pupils are equal, round, and reactive to light. Conjunctivae and EOM are normal. No scleral icterus.   Neck: Normal range of motion. Neck supple.   Cardiovascular: Exam reveals no gallop and no friction rub.    No murmur heard.  Pulmonary/Chest: Effort normal and breath sounds normal.   Abdominal: Soft. Bowel sounds are normal.   Musculoskeletal: Normal range of motion.   Neurological: He is alert and oriented to person, place, and time. He has normal reflexes.   Skin: Skin is warm and dry.   Psychiatric: His mood appears anxious. He is agitated.   Nursing note and vitals reviewed.   Lab Results   Component Value Date/Time    WBC 10.8 07/29/2018 01:46 AM    HEMOGLOBIN 12.5 (L) 07/29/2018 01:46 AM    HEMATOCRIT 38.7 (L) 07/29/2018 01:46 AM    PLATELETCT 395 07/29/2018 01:46 AM     Lab Results   Component Value Date/Time    SODIUM 136 07/29/2018 01:46 AM    POTASSIUM 4.4 07/29/2018 01:46 AM    CHLORIDE 101 07/29/2018 01:46 AM    CO2 25 07/29/2018 01:46 AM    GLUCOSE 126 (H) 07/29/2018 01:46 AM    BUN 20 07/29/2018 01:46 AM    CREATININE 0.91 07/29/2018 01:46 AM    CREATININE 0.9 02/17/2008 06:58 AM      Assessment/Plan    * Encephalopathy- (present on admission)   Assessment & Plan    - most likely due to medication/Ativan use v. Undiagnosed generalized anxiety disorder  - CVA workup unremarkable; EEG pending, MRI brain unremarkable  - Cardiac workup and Cardiology evaluation noted; will resume cardiac meds  - refusing Fluoxetine and asking for Seroquel; cont prn Ativan and monitor        Chronic systolic (congestive) heart failure (HCC)- (present on admission)   Assessment & Plan    - cont outpt cardiac meds per Cards recommendation  - s/p limited ECHO in ED read by Cardiology with no changes  - cont IV Lasix BID        Sinus tachycardia- (present on admission)   Assessment & Plan    - restarting BB and monitor        Generalized anxiety disorder- (present  on admission)   Assessment & Plan    - refusing Fluoxetine; starting Seroquel per pt request  - prn Xanax on board  - uncontrolled; will obtain Psych consult        Polysubstance abuse- (present on admission)   Assessment & Plan    Methamphetamine and cannabinoids noted from [facility]  - cessation counseling provided        Hyponatremia- (present on admission)   Assessment & Plan    - resolved; monitoring        Obesity (BMI 35.0-39.9 without comorbidity)- (present on admission)   Assessment & Plan    Body mass index is 31.13 kg/m².           Quality-Core Measures

## 2018-07-30 NOTE — HEART FAILURE PROGRAM
"Cardiovascular Nurse Navigator () Advanced Heart Failure Program Inpatient Progress Note:    Patient was just discharged on7/28, Saturday at 1434.      At 1518 a rapid response was called for the patient who was in the cafeteria found responsive sitting up in chair with dc paperwork in hand by code team apparently rapid was called because physicians lunching nearby noticed he was slumped over with \"difficulty breathing\".    Dr. Merary Navarro consulted in ER and noted that this patient was not in acute heart failure. Patient had an appointment with the FP clinic, Ale[facility]ERETT on 7/31. This [employer] requested that hospital schedulers push this appointment out about a week as today's BSRRossi GRANDE does not believe patient will be discharged.    Patient's current principal problem is encephalopathy most likely due to ativan use. However, this will count as a heart failure readmission.    Aug 07, 2018  9:00 AM PDT  Heart Failure New Patient with JESSE Bah  [facility] (--) [address], [facility] [address]  [facility] [address]  [phone]   Aug 10, 2018 10:50 AM PDT  New Patient with Destini Lee M.D.  [facility] [address] [facility] [address]  [phone]         Thank you and please call with questions, Concetta"

## 2018-07-30 NOTE — CARE PLAN
Problem: Knowledge Deficit  Goal: Knowledge of disease process/condition, treatment plan, diagnostic tests, and medications will improve  Outcome: PROGRESSING AS EXPECTED  Discussed current plan of care with the patient

## 2018-07-30 NOTE — PROGRESS NOTES
"Pt states \"my JESSA card with 1500 bucks and my e-pen is gone\". This RN was present as he searched through his belongs. This RN called security to find out if they had any information which was then transferred to safekeeping, ER registration and lead registration. Pt's stated items were not found, charge RN aware of the situation and told pt that it will investigated further during daytime. Will continue to monitor  "

## 2018-07-30 NOTE — PROGRESS NOTES
Received report from previous shift RN, patient sleeping with no signs of distress noted. Will continue to monitor

## 2018-07-30 NOTE — PSYCHIATRY
"PSYCHIATRIC CONSULTATION:  Reason for admission: Encephalopathy  Reason for consult: Anxiety  Requesting Physician: Dr. Ralph Perez  Supervising Physician: Dr. Liseth Lindo    Legal status: No hold    Chief Complaint: \"I hate everything about my life right now.\"    HPI:     Mr. Arteaga is a 38 year old  male with history of CHF who was recently admitted for CHF exacerbation; he was discharged a day later but then noted to be confused and SOB in the [facility] cafeteria. He was re-admitted for encephalopathy, thought possibly secondary to Ativan. Cardiology was consulted and felt that he was at baseline cardiac function. We are consulted for extreme anxiety as patient is saturating well on RA but still feels extremely SOB all the time, as well as agitated.    On interview, Mr. Arteaga says that he is upset about losing his money, ID card, credit card, and unemployment card. He says, \"I just want to die. There's nothing left for me to live for anymore. I'm dying and my parents kicked me out on my birthday. I have nowhere to go.\" When I told him that I may have to place him on a legal hold for SI, he says, \"I'm not suicidal. I don't actually plan on killing myself, I just hate my life. There's a difference.\" History was very limited as the patient did not answer a lot of questions and kept on talking about how angry he was at various things in his life. In addition, every few minutes or so he would suddenly tense up and start breathing very rapidly and shallow, saying, \"I can't breathe! I can't breathe!\" He was able to be calmed down and encouraged to take deep breaths successfully only a few times.     What history I got from him is that he feels that he was discharged too early. \"They saw me walking down the hallways like, *makes panting noises* and I was stumbling. And they still discharged me. Can you believe this place?\" He had been living with his parents when they kicked him and his son out " "on , apparently. He continues to perseverate on that, saying, \"My own father told me that my kids would come to my own  in a few years. What kind of person says that?\" He was also fired from his job on . He would not give details on where he stayed since, although he apparently did stay briefly at the shelter and then one night at [facility] recently. He also did not give details on why he stopped seeing cardiology two years ago. Patient kept on perseverating on his lost wallet; when pressed on his finances, he was able to admit that he will get about $11,400 next month.    He made various references to how he is always unfairly treated. For example, he spent a long time talking about how the world \"doesn't treat redheads well; we're in a different class.\" Later when I saw him, he had his lunch at bedside but he told me \"They took 3 hours to bring one pre-packaged pudding to my room. 3 hours! They're just messing with me, I know it. They're doing it just to piss me off. I\"m so angry that I can't even eat right now, and I'm hungry!\" When I told him that I doubted anyone was doing anything on purpose, he says, \"Why else would they do that? They're doing it specifically to me! So it's all my fault again, is that what you're saying? That's what everyone always says!\" When asked if he felt like harming anyone, he says no.    Psychiatric Review of Systems:  Depression:  Yes  Mireya: Unable to obtain  Anxiety/Panic Attacks  Yes  PTSD symptom:  Unable to obtain  Psychosis: Unable to obtain      Medical Review of Systems: All systems reviewed. Only those found to be + are noted below. All others are negative.   Neurological:    TBIs: Unable to obtain   SZs: Unable to obtain   Strokes: Unable to obtain    Psychiatric Examination:   Vitals: Weight/BMI: Body mass index is 31.13 kg/m². Blood pressure 112/83, pulse 76, temperature 36.1 °C (97 °F), resp. rate 16, height 1.829 m (6'), weight 104.1 kg " "(229 lb 8 oz), SpO2 98 %.   Vitals:    07/30/18 0146 07/30/18 0400 07/30/18 0526 07/30/18 0800   BP: 116/97 120/84 102/87 112/83   Pulse: 90 (!) 102 (!) 109 76   Resp:  20  16   Temp:  36.5 °C (97.7 °F)  36.1 °C (97 °F)   SpO2: 96% 98%  98%   Weight:       Height:         Appearance:  male with tattoos over body; dressed in hospital clothes  Behavior: Angry, irritable, guarded and distrustful, does not make eye contact most of the time unless very angry about something  Thought Content: No SI/HI; no AVH; + persecutory delusions  Thought Process: Very tangential, perseverates on several different topics  Speech: Loud, rambling at times; yells at times  Mood: \"How do you think I feel? I lost everything!\"  Affect: Irritable, agitated, dramatic  Attention/Alertness: Alert, does not attend well to interview  Orientation/Memory: Unable to assess  Insight/Judgement:  Poor/poor    Past Psychiatric Hx:   Never been hospitalized; denies any previous suicide attempts. Has been at [facility] ED(?) before, not admitted. Previous meds: xanax, ativan, wellbutrin    Family Psychiatric Hx:  Unable to obtain    Social Hx:  Social History   Substance Use Topics   • Smoking status: Light Tobacco Smoker     Packs/day: 0.07     Types: Cigarettes   • Smokeless tobacco: Never Used   • Alcohol use Yes      Comment: social     Grew up in [place]. Has two children from two different relationships who do not live with him; his son is 3 years old and living in [place].    Medical Hx:   Past Medical History:   Diagnosis Date   • Congestive heart failure (HCC)    • Gastritis        Medications:  No current facility-administered medications on file prior to encounter.      Current Outpatient Prescriptions on File Prior to Encounter   Medication Sig Dispense Refill   • ALPRAZolam (XANAX) 0.5 MG Tab Take 1 Tab by mouth 3 times a day as needed for Anxiety for up to 7 days. 20 Tab 0   • aspirin (ASA) 81 MG Chew Tab chewable tablet Take 1 Tab by " mouth every day. 100 Tab 0   • lisinopril (PRINIVIL) 5 MG Tab Take 1 Tab by mouth every day. 30 Tab 0   • carvedilol (COREG) 12.5 MG Tab Take 1 Tab by mouth 2 times a day, with meals. 60 Tab 0   • spironolactone (ALDACTONE) 25 MG Tab Take 1 Tab by mouth every day. 30 Tab 3       Labs:  Lab Results   Component Value Date/Time    WBC 10.8 07/29/2018 01:46 AM    RBC 4.25 (L) 07/29/2018 01:46 AM    HEMOGLOBIN 12.5 (L) 07/29/2018 01:46 AM    HEMATOCRIT 38.7 (L) 07/29/2018 01:46 AM    MCV 91.1 07/29/2018 01:46 AM    MCH 29.4 07/29/2018 01:46 AM    MCHC 32.3 (L) 07/29/2018 01:46 AM    MPV 9.7 07/29/2018 01:46 AM    NEUTSPOLYS 68.50 07/29/2018 01:46 AM    LYMPHOCYTES 23.30 07/29/2018 01:46 AM    MONOCYTES 5.60 07/29/2018 01:46 AM    EOSINOPHILS 1.60 07/29/2018 01:46 AM    BASOPHILS 0.60 07/29/2018 01:46 AM      Lab Results   Component Value Date/Time    SODIUM 136 07/29/2018 01:46 AM    POTASSIUM 4.4 07/29/2018 01:46 AM    CHLORIDE 101 07/29/2018 01:46 AM    CO2 25 07/29/2018 01:46 AM    GLUCOSE 126 (H) 07/29/2018 01:46 AM    BUN 20 07/29/2018 01:46 AM    CREATININE 0.91 07/29/2018 01:46 AM    CREATININE 0.9 02/17/2008 06:58 AM      Lab Results   Component Value Date/Time    ALTSGPT 43 07/29/2018 01:46 AM    ASTSGOT 22 07/29/2018 01:46 AM    ALKPHOSPHAT 46 07/29/2018 01:46 AM    TBILIRUBIN 0.7 07/29/2018 01:46 AM    LIPASE 38 07/27/2018 01:46 AM    ALBUMIN 3.7 07/29/2018 01:46 AM    GLOBULIN 2.9 07/29/2018 01:46 AM    INR 1.15 (H) 07/28/2018 03:25 PM     Lab Results   Component Value Date/Time    PROTHROMBTM 14.4 07/28/2018 03:25 PM    INR 1.15 (H) 07/28/2018 03:25 PM        ECG: QTc 507 on 7/29/18    ASSESSMENT:   1. Unspecified anxiety disorder  2. Unspecified mental disorder    This is a 38 year old  male with heart failure, here with SOB believed to be related to anxiety as well as extreme mood dysregulation and agitation for unclear reasons at this time. He is agreeable to remain in the hospital for now, and  does not meet legal criteria for hold. However, will carefully monitor for continued agitation or deterioration to the point where ability to care for self is in serious question. He would benefit from inpatient psychiatric hospitalization when he is medically clear.    PLAN:  Legal status: does not meet criteria for hold    - Start Depakote 250 mg BID for mood dysregulation  - DC Xanax, will start Klonopin 0.5 mg BID PRN  - DC Seroquel because of prolonged QT, start Risperdal 1 mg BID for agitation (less likely to prolong QT)    Psychiatry will follow.  Thank you for the consult.

## 2018-07-30 NOTE — PROGRESS NOTES
BS-[id number] at this time and patient currently refusing insulin injection. Discuss with patient and agree to another blood sugar check an hour later and implement SSI if needed. Will continue to monitor

## 2018-07-30 NOTE — PROGRESS NOTES
Pt. Took off heart monitor; refuses to put monitor back on until he receives food. Staff member from kitchen went in to help deescalate the situation. She called the kitchen in front of the patient, asked for snacks to be brought up continually in between meals. Pt was agreeable.

## 2018-07-30 NOTE — THERAPY
OT orders received. Attempted OT eval however pt currently in EEG procedure. Will attempt again as able and appropriate.     Leah Marvin, OTR/L  Pager: [phone]

## 2018-07-30 NOTE — PROGRESS NOTES
1145: pt requested a snack/CLEMENTE wick called kitchen and left message with kitchen to bring up chocolate ice cream.   1230: pt took off heart monitor, and refused to put it back on until he received food. Staff member from the kitchen went in to deescalate the situation. She called the kitchen in front of the pt, she asked for snacks to be brought up continually in between meals. Pt was agreeable and calm.   1245: pt's mood escalated to yelling profanities down the hallway and he got out of bed and started to walk down the hallway to leave and security was called because the pt still had IV inserted. Security arrived around 1255. Pt became more agitated and his mood escalating more with his family at bed side unable to calm the pt down.   1315: Removed family from bedside, tried to use therapeutic communication and distraction.    1321: Order for PRN one time dose of 1mg IV Ativan and prn 0.5mg clonazepam PO administered per MAR.   1330: reassessment pt's behavior had deescalated to a calmer less anxious state.

## 2018-07-30 NOTE — PSYCHIATRY
BRIEF PSYCHIATRIC CONSULT NOTE: patient seen, full note to follow.  -Legal Hold:not indicated (does not meet criteria at this time)  -Assessment: severe anxiety and emotional dysregulation, worry for inability to care for self in setting of homelessness and CHF  -Plan:    - Would benefit from inpatient psychiatry, will place consult to  for admission   - Will start Klonopin 0.5 mg BID PRN, no standing orders for benzos for now   - Will start Depakote 250 mg BID for mood lability

## 2018-07-30 NOTE — PROGRESS NOTES
Bedside report received from night RN; assumed pt care. Pt refused RN to do a complete assessment. Reviewed plan of care with pt. Tele box on and rhythm verified. Chart and labs reviewed. Bed in lowest position, and 2 side rails up. Pt educated on call light; call light with in reach.

## 2018-07-30 NOTE — CARE PLAN
Problem: Safety  Goal: Will remain free from injury  Outcome: PROGRESSING AS EXPECTED  Discussed safety protocol and fall prevention program with the patient

## 2018-07-31 NOTE — PROGRESS NOTES
Pt given clonazepam at 2142 for anxiety. Pt is visibly drowsy. Pt unable to maintain eye contact and dozes off during conversation. Pt requested that seroquel be given to him for sleep. Explained to pt reasoning that seroquel is not available for him and that it is unsafe to give any other medications for sleep because he is already drowsy. Will continue to monitor.

## 2018-07-31 NOTE — PROGRESS NOTES
Pt continuously gets up from bed without calling. Will continuously move back and forth from bed to chair. Pt is visibly unstable and has been educated on his risk for injury and falls. Bed alarm was in use for patient. Pt has stated his frustration with the bed alarm and has refused to have the bed alarm on. Pt continues to refuse bed alarm even though education on importance given.

## 2018-07-31 NOTE — PROGRESS NOTES
Patient is less agitated and is able to ambulate in hallway without loss of balance. Informed of safety and call system. Not monitored. Friend at bedside.

## 2018-07-31 NOTE — PROGRESS NOTES
Spiritual Care Note           Patient's Name: Ryan Arteaga   MRN: [id number]    Age and Gender: 38 y.o. male   YOB: 1980   Place of Residence: [place], [place]   Family/Friends/Others Present: 1 friend   Unit: Telemetry   Room (and Bed): [place] [address]   Ethnicity or Nationality: white   Primary Language: English   Medical Diagnoses: chronic systolic (congestive) heart failure  sinus tachycardia  generalized anxiety disorder  polysubstance abuse       methamphetamine and cannabinoids    Moravian Affiliation: none   Code Status: Full Code    Date of Admission: 7/28/2018    Length of Stay: 2 days   Date of Visit: 7/30/2018       Situation/Reason for Visit:  Received  Services Request Order.    Background:  See above diagnoses.     Observations:  Patient was sitting up in chair and alert.  Throughout the conversation he would lean back and forth, making gasping sounds like he wasn't getting his breath.  Patient appeared to maintain alertness and was oriented through it all.    Summary of Interaction/Conversation with Patient and/or Family/Friends/Others:  Patient talked about his heart failure, his worry about his daughter and son, his belief that he would die soon, his belief that he wasn't being treated effectively here at [facility].  He indicated that he wanted me to pray with him.    Assessment of Cultural/Social/Emotional/Spiritual Issues:  Anxiety    Interventions:  Compassionate presence, emotional support, reflective listening, prayer.    Outcomes:  Uncertain.    Consultations/Referrals:  none    Requests/Recommendations:  none    Continuing Care:  Upon request.      Contact Information:  Chaplain BERNARDA Rebolledo  [phone]   [email or web address]

## 2018-07-31 NOTE — PROGRESS NOTES
Bedside rounds with primary physician. Patient has been sleeping most of morning. Informed of plan of care. He is requesting to go home. Awaiting EEG results.

## 2018-07-31 NOTE — CARE PLAN
Problem: Safety  Goal: Will remain free from injury  Outcome: PROGRESSING AS EXPECTED  Pt safety precautions in place; (bed alarm in place; call light with in reach; education on use of call light and when to call the nurse; non slip socks in place)  Intervention: Provide assistance with mobility   07/30/18 1824   OTHER   Assistance / Tolerance Standby Assist;Tolerates Well         Problem: Psychosocial Needs:  Goal: Level of anxiety will decrease  Outcome: NOT MET   07/30/18 1400   OTHER   Patient Behaviors Agitated;Aggressive Verbally;Angry;Anxious;Hostile;Irritable;Restless;Tearful;Uncooperative   1145: pt requested a snack/CLEMENTE wick called kitchen and left message with kitchen to bring up chocolate ice cream.   1230: pt took off heart monitor, and refused to put it back on until he received food. Staff member from the kitchen went in to deescalate the situation. She called the kitchen in front of the pt, she asked for snacks to be brought up continually in between meals. Pt was agreeable and calm.   1245: pt's mood escalated to yelling profanities down the hallway and he got out of bed and started to walk down the hallway to leave and security was called because the pt still had IV inserted. Security arrived around 1255. Pt became more agitated and his mood escalating more with his family at bed side unable to calm the pt down.   1315: Removed family from bedside, tried to use therapeutic communication and distraction.    1321: Order for PRN one time dose of 1mg IV Ativan and prn 0.5mg clonazepam PO administered per MAR.   1330: reassessment pt's behavior had deescalated to a calmer less anxious state.    Psychiatrist involed and added Klonopin to pt's MAR; still unable to control pt anxiety.

## 2018-07-31 NOTE — THERAPY
"Physical Therapy Evaluation completed.   Bed Mobility:  Supine to Sit: Modified Independent  Transfers: Sit to Stand: Supervised  Gait: Level Of Assist: Supervised with No Equipment Needed       Plan of Care: Patient with no further skilled PT needs in the acute care setting at this time  Discharge Recommendations: Equipment: No Equipment Needed.     See \"Rehab Therapy-Acute\" Patient Summary Report for complete documentation.     Pt was recently admitted for acute anxiety and confusion. Pt presented to PT for primary risk reduction for LOB/falling. Pt was able to demonstrate SPV to Mod I for all functinal mobility at this time with no AD use. No alexia LOB was noted during flat surface ambulation, or during high dynamic testing such as picking object off floor, SLS, narrow MARLENA standing with eyes closed/pertabations, and head turns during ambulation. Pt appears to be near his functional baseline with ambulation and balance. Encouraged pt and nsg staff to continue ambulating pt while in acute care setting to avoid deconditioning. Pt is in no acute skilled PT needs at this time. Anticipate pt to d/c home once medically clear.   "

## 2018-07-31 NOTE — PROCEDURES
DATE OF SERVICE:  07/30/2018    HISTORY OF PRESENT ILLNESS:  The patient is a 38-year-old male with a history   of near syncopal events associated with shortness of breath and chest pain.    No actual loss of consciousness was ever documented.  There was a history of   multiple substance abuse, associated anxiety and possible withdrawal.  EEG is   requested to rule out underlying seizure activity.  There is no history of   epilepsy, no previous tracings are available for comparison.    CONDITION OF RECORDING:  This is a 21-channel, portable, digital video EEG   tracing of approximately 30-minute duration.  Bipolar and referential montages   are used.  Only photic stimulation is done.  The patient is awake throughout   the tracing.  He is on Xanax, Seroquel, Klonopin, Depakote, and Ativan.    TRACING DESCRIPTION:  As the tracing begins, when the patient is awake, alert,   and with his eyes closed, an alpha frequency rhythm can be seen briefly over   the occipital hemispheres.  There is no asymmetry, otherwise.  No paroxysmal   activity is seen.  Diffuse beta frequency is also seen consistent with   sedative effect.    Throughout the tracing, patient is seen to be active, talking frequently, and   this is associated with generalized muscle artifact as well as movement   artifact.  No clear seizure activity is seen in association.  Rhythmic leg   movements are seen again with rhythmic muscle artifact on the tracing.  No   focal slowing or paroxysmal activity is seen in association.    The patient does not achieve drowsiness or light stage sleep for the tracing.    Photic stimulation revealed only slight driving response bilaterally, no   activation occurred.    IMPRESSION:  This is an EEG that is within the range of normal for a patient   of this age in the awake state.  No seizure activity is seen.  No focal   slowing or other paroxysmal qualities are documented.  Clinical correlation is   suggested.        ____________________________________     MD MARIA GUADALUPE YEN    DD:  07/31/2018 13:26:44  DT:  07/31/2018 13:42:51    D#:  [id number]  Job#:  [id number]

## 2018-07-31 NOTE — THERAPY
"Occupational Therapy Evaluation completed.   Functional Status:  Pt SPV for BADLs and functional transfers.   Plan of Care: Patient with no further skilled OT needs in the acute care setting at this time  Discharge Recommendations:  Equipment: No Equipment Needed. Post-acute therapy Currently anticipate no further skilled therapy needs once patient is discharged from the inpatient setting.    See \"Rehab Therapy-Acute\" Patient Summary Report for complete documentation.    OT eval completed on 39 YO M admitted with confusion after recent d/c. Pt's SO present for tx session. Pt reports he has not been sleeping well during hospital stay and is feeling drowsy. Pt unsure of d/c location however reports he may d/c to parents. Pt does not require acute OT services at this time and anticipate pt will not require continued therapy upon d/c.   "

## 2018-07-31 NOTE — PROGRESS NOTES
Received Bedside report. Assumed care at 1900. This pt is AOx4, ambulatory with SBA, voiding appropriately, denies pain. Patient and RN discussed plan of care: questions answered. Labs noted, assessment complete, patient tolerating regular diet. Tele box in place. Pt is on RA to 3L of O2 via NC. Pt has episodes of where he states to have some SOB. Call light in place, fall precautions in place, patient educated on importance of calling for assistance. No additional needs at this time. VSS

## 2018-07-31 NOTE — PROGRESS NOTES
"Pt concerned about his valuables. Ambulated self to the hallway and made statements about leaving AMA. Also made statements of being upset that he was discharged previously when unable to walk down the hallway and that we \"were not doing anything for him\". Reassured patient that we are doing our best to care for him and that it will be decided in the morning whether he is stable enough to go home. Pt stated that no one cared about his valuables. Security was contacted. Security assisted patient back into bed and discussed his concerns over his items. Pt has decided to stay at this time. Security notified this RN that pt has stated that he'd die to go out and smoke a cigarette. Dr. Basil kruger. Received orders for nicotine patch and gum. Both administered to the patient. Will continue to monitor.  "

## 2018-07-31 NOTE — PROGRESS NOTES
"Pt states he felt \"suffocated being inside the hospital room\". Due to pt being drowsy, nodding off during conversation, and unable to maintain eye contact, pt was wheel-chaired around the unit. When transferring pt back to bed, pt was again unsteady. Educated pt on his level of risk as well as bed alarm. Pt continues to refuse bed alarm and safety interventions.   "

## 2018-07-31 NOTE — CARE PLAN
Problem: Safety  Goal: Will remain free from injury  Outcome: PROGRESSING SLOWER THAN EXPECTED  Patient's risk for injury and falls assessed. Appropriate safety precautions in place. Patient educated to utilize call light for needs. Pt does not utilize call light, and gets up without assistance.    Problem: Respiratory:  Goal: Respiratory status will improve  Outcome: PROGRESSING SLOWER THAN EXPECTED  Patient respiratory status assessed. Patient educated on importance of coughing and deep breathing. Pt has continuous pulse monitoring. Pt needs coaching on taking deep breaths. Pt was given incentive spirometer to help deepen breaths.

## 2018-08-01 NOTE — PROGRESS NOTES
Notified physician that patient wants to leave AMA. He is dressed and removed IV. Refuses to sign form.

## 2018-08-01 NOTE — PROGRESS NOTES
Ryan Mtzsaul patient has chosen to leave the hospital against medical advice. The attending physician has not discharged the patient. Patient is not a risk to himself or others. I have discussed with the patient the following:  Physician has not determined patient is ready for discharge.      Discharge against medical advice form has been Patient refused to sign.      Patient is a   and a referral to  was made.    Attending physician has been notified.

## 2018-08-03 ENCOUNTER — HOSPITAL ENCOUNTER (EMERGENCY)
Dept: HOSPITAL 8 - ED | Age: 38
Discharge: LEFT BEFORE DISCHARGE | End: 2018-08-03
Payer: MEDICAID

## 2018-08-03 VITALS — WEIGHT: 218.26 LBS | BODY MASS INDEX: 29.56 KG/M2 | HEIGHT: 72 IN

## 2018-08-03 VITALS — DIASTOLIC BLOOD PRESSURE: 73 MMHG | SYSTOLIC BLOOD PRESSURE: 123 MMHG

## 2018-08-03 DIAGNOSIS — F17.200: ICD-10-CM

## 2018-08-03 DIAGNOSIS — I50.9: Primary | ICD-10-CM

## 2018-08-03 LAB
ALBUMIN SERPL-MCNC: 3.5 G/DL (ref 3.4–5)
ALP SERPL-CCNC: 55 U/L (ref 45–117)
ALT SERPL-CCNC: 41 U/L (ref 12–78)
ANION GAP SERPL CALC-SCNC: 13 MMOL/L (ref 5–15)
BASOPHILS # BLD AUTO: 0.06 X10^3/UL (ref 0–0.1)
BASOPHILS NFR BLD AUTO: 1 % (ref 0–1)
BILIRUB SERPL-MCNC: 1.2 MG/DL (ref 0.2–1)
CALCIUM SERPL-MCNC: 8.3 MG/DL (ref 8.5–10.1)
CHLORIDE SERPL-SCNC: 107 MMOL/L (ref 98–107)
CREAT SERPL-MCNC: 1.05 MG/DL (ref 0.7–1.3)
EOSINOPHIL # BLD AUTO: 0.09 X10^3/UL (ref 0–0.4)
EOSINOPHIL NFR BLD AUTO: 1 % (ref 1–7)
ERYTHROCYTE [DISTWIDTH] IN BLOOD BY AUTOMATED COUNT: 15 % (ref 9.4–14.8)
INR PPP: 1.13 (ref 0.93–1.1)
LYMPHOCYTES # BLD AUTO: 2.12 X10^3/UL (ref 1–3.4)
LYMPHOCYTES NFR BLD AUTO: 20 % (ref 22–44)
MCH RBC QN AUTO: 28.8 PG (ref 27.5–34.5)
MCHC RBC AUTO-ENTMCNC: 33.2 G/DL (ref 33.2–36.2)
MCV RBC AUTO: 86.8 FL (ref 81–97)
MD: NO
MONOCYTES # BLD AUTO: 0.65 X10^3/UL (ref 0.2–0.8)
MONOCYTES NFR BLD AUTO: 6 % (ref 2–9)
NEUTROPHILS # BLD AUTO: 7.89 X10^3/UL (ref 1.8–6.8)
NEUTROPHILS NFR BLD AUTO: 73 % (ref 42–75)
PLATELET # BLD AUTO: 332 X10^3/UL (ref 130–400)
PMV BLD AUTO: 8.2 FL (ref 7.4–10.4)
PROT SERPL-MCNC: 7.3 G/DL (ref 6.4–8.2)
PROTHROMBIN TIME: 11.6 SECONDS (ref 9.6–11.5)
RBC # BLD AUTO: 4.3 X10^6/UL (ref 4.38–5.82)
TROPONIN I SERPL-MCNC: < 0.015 NG/ML (ref 0–0.04)
TSH SERPL-ACNC: 1.93 MIU/L (ref 0.36–3.74)

## 2018-08-03 PROCEDURE — 80307 DRUG TEST PRSMV CHEM ANLYZR: CPT

## 2018-08-03 PROCEDURE — 71045 X-RAY EXAM CHEST 1 VIEW: CPT

## 2018-08-03 PROCEDURE — 80053 COMPREHEN METABOLIC PANEL: CPT

## 2018-08-03 PROCEDURE — 93005 ELECTROCARDIOGRAM TRACING: CPT

## 2018-08-03 PROCEDURE — 83880 ASSAY OF NATRIURETIC PEPTIDE: CPT

## 2018-08-03 PROCEDURE — 85610 PROTHROMBIN TIME: CPT

## 2018-08-03 PROCEDURE — 84484 ASSAY OF TROPONIN QUANT: CPT

## 2018-08-03 PROCEDURE — 96374 THER/PROPH/DIAG INJ IV PUSH: CPT

## 2018-08-03 PROCEDURE — 83735 ASSAY OF MAGNESIUM: CPT

## 2018-08-03 PROCEDURE — 85025 COMPLETE CBC W/AUTO DIFF WBC: CPT

## 2018-08-03 PROCEDURE — 84443 ASSAY THYROID STIM HORMONE: CPT

## 2018-08-03 PROCEDURE — 99285 EMERGENCY DEPT VISIT HI MDM: CPT

## 2018-08-03 PROCEDURE — 36415 COLL VENOUS BLD VENIPUNCTURE: CPT

## 2018-08-06 PROBLEM — R06.02 SHORTNESS OF BREATH: Status: ACTIVE | Noted: 2018-01-01

## 2018-08-06 PROBLEM — I50.42 CHRONIC COMBINED SYSTOLIC AND DIASTOLIC CONGESTIVE HEART FAILURE (HCC): Status: ACTIVE | Noted: 2018-01-01

## 2018-08-06 PROBLEM — E87.20 LACTIC ACIDOSIS: Status: ACTIVE | Noted: 2018-01-01

## 2018-08-06 PROBLEM — R79.9 ELEVATED BUN: Status: ACTIVE | Noted: 2018-01-01

## 2018-08-06 NOTE — ED TRIAGE NOTES
"Chief Complaint   Patient presents with   • Difficulty Breathing   • Weakness     Patient BIB [facility] to triage. Patient states that \"I can't catch my breath, I am getting weaker, my whole body hurts\". Patient has CHF. Does not take any medications. Patient admitted twice last month. /80   Pulse (!) 105   Temp 36.6 °C (97.9 °F)   Resp 20   Ht 1.829 m (6')   Wt 104 kg (229 lb 4.5 oz)   SpO2 98%   BMI 31.10 kg/m²     "

## 2018-08-06 NOTE — ASSESSMENT & PLAN NOTE
We are no longer going to monitor at this point his lactic acid levels he is at this point elected to go on comfort care.

## 2018-08-06 NOTE — PROGRESS NOTES
[facility] Hospitalist Progress Note    Date of Service: 8/6/2018    Chief Complaint  38 y.o. male admitted 8/5/2018 with shortness of breath.    Interval Problem Update  Patient initially was admitted because of worsening congestive heart failure. The heart failure is acute and biventricular [place] class IV. The patient wanted a heart transplant. Due to the fact that the patient abuses methamphetamines he cannot get a heart transplant at this point in time. The patient was explained that the medications would help him optimize his cardiac status but he does not want optimization he just wants to be comfortable thank you after discussing it with him today the patient wants to be a hospice and comfort care and thus at this point this has been ordered for him.    Consultants/Specialty  None    Disposition  Options will be to discharge home with hospice versus inpatient hospice versus comfort care inpatient.        Review of Systems   Constitutional: Positive for weight loss. Negative for chills, diaphoresis and fever.   HENT: Negative.    Eyes: Negative.  Negative for double vision.   Respiratory: Positive for shortness of breath. Negative for cough, hemoptysis and wheezing.    Cardiovascular: Positive for chest pain. Negative for palpitations and leg swelling.   Gastrointestinal: Negative.  Negative for abdominal pain, blood in stool, constipation, diarrhea, heartburn, nausea and vomiting.   Genitourinary: Negative.  Negative for frequency, hematuria and urgency.   Musculoskeletal: Negative.  Negative for joint pain.   Skin: Negative.  Negative for itching and rash.   Neurological: Positive for weakness. Negative for dizziness, focal weakness, seizures, loss of consciousness and headaches.   Endo/Heme/Allergies: Negative.  Does not bruise/bleed easily.   Psychiatric/Behavioral: Positive for depression. Negative for suicidal ideas. The patient is nervous/anxious.    All other systems reviewed and are negative.      Physical Exam  Laboratory/Imaging   Hemodynamics  Temp (24hrs), Av.4 °C (97.6 °F), Min:36.2 °C (97.1 °F), Max:36.7 °C (98.1 °F)   Temperature: 36.2 °C (97.1 °F)  Pulse  Av  Min: 93  Max: 111 Heart Rate (Monitored): (!) 104  Blood Pressure: (!) 91/64, NIBP: (!) 99/75      Respiratory      Respiration: 16, Pulse Oximetry: 97 %, O2 Daily Delivery Respiratory : Room Air with O2 Available        RUL Breath Sounds: Diminished, RML Breath Sounds: Diminished, RLL Breath Sounds: Diminished, RENE Breath Sounds: Diminished, LLL Breath Sounds: Diminished    Fluids    Intake/Output Summary (Last 24 hours) at 18 1506  Last data filed at 18 1400   Gross per 24 hour   Intake              520 ml   Output             1400 ml   Net             -880 ml       Nutrition  Orders Placed This Encounter   Procedures   • Diet Order Regular     Standing Status:   Standing     Number of Occurrences:   1     Order Specific Question:   Diet:     Answer:   Regular [1]     Physical Exam   Constitutional: He is oriented to person, place, and time. He appears well-developed and well-nourished.   HENT:   Head: Normocephalic and atraumatic.   Right Ear: External ear normal.   Left Ear: External ear normal.   Nose: Nose normal.   Mouth/Throat: Oropharynx is clear and moist.   Eyes: Pupils are equal, round, and reactive to light. Conjunctivae and EOM are normal.   Neck: Normal range of motion. Neck supple. JVD present. No thyromegaly present.   Cardiovascular: Normal rate and regular rhythm.    Murmur heard.  Pulmonary/Chest: He has no wheezes. He has no rales. He exhibits no tenderness.   Abdominal: He exhibits no distension and no mass. There is no tenderness. There is no rebound and no guarding.   Musculoskeletal: Normal range of motion. He exhibits edema. He exhibits no tenderness.   Lymphadenopathy:     He has no cervical adenopathy.   Neurological: He is alert and oriented to person, place, and time. He has normal reflexes.  No cranial nerve deficit.   Skin: Skin is warm and dry. No rash noted. No erythema.   Multiple tattoos    Psychiatric: His mood appears anxious. His speech is delayed. He is slowed. Cognition and memory are normal. He expresses impulsivity.   Nursing note and vitals reviewed.      Recent Labs      08/05/18   2350   WBC  8.6   RBC  4.55*   HEMOGLOBIN  12.9*   HEMATOCRIT  40.6*   MCV  89.2   MCH  28.4   MCHC  31.8*   RDW  45.7   PLATELETCT  318   MPV  9.7     Recent Labs      08/05/18   2350   SODIUM  138   POTASSIUM  4.1   CHLORIDE  107   CO2  19*   GLUCOSE  108*   BUN  25*   CREATININE  0.86   CALCIUM  8.9         Recent Labs      08/05/18   2350  08/06/18   0849   BNPBTYPENAT  714*  598*              Assessment/Plan     * Shortness of breath   Assessment & Plan    Shortness of breath at this point is secondary to his end-stage combined heart failure. The patient at this point will be continued on pain management and anxiety management.        Chronic combined systolic and diastolic congestive heart failure (HCC)- (present on admission)   Assessment & Plan    Patient has an ejection fraction of only 20%.  Patient has decided that he no longer wants to live like this and he would like comfort care and hospice care. At this point the patient will be placed on comfort care we have at this point spoken with hospice. They stated that because of his 20% ejection fraction he is a hospice candidate. Thus at this point the patient will be taken off his other medications and will be made comfortable.        Elevated BUN   Assessment & Plan    At this point we are not continuing with monitoring of his BUN.        Lactic acidosis   Assessment & Plan    We are no longer going to monitor at this point his lactic acid levels he is at this point elected to go on comfort care.        Sinus tachycardia- (present on admission)   Assessment & Plan    Takeoff the telemetric monitoring patient's going to be comfortable and he will be  transferred to the medical floor        Generalized anxiety disorder- (present on admission)   Assessment & Plan    Patient will be placed on Ativan for his anxiety is much as necessary        Polysubstance abuse- (present on admission)   Assessment & Plan    Patient's history of methamphetamine is what caused him to have an ejection fraction of only 20%.        Obesity (BMI 30.0-34.9)- (present on admission)   Assessment & Plan    At this point BMI currently is 30.8          Quality-Core Measures   Reviewed items::  EKG reviewed, Labs reviewed, Medications reviewed and Radiology images reviewed  Dai catheter::  No Dai  DVT prophylaxis pharmacological::  Not indicated at this time, ambulatory  Ulcer Prophylaxis::  Not indicated  Assessed for rehabilitation services:  Patient returned to prior level of function, rehabilitation not indicated at this time

## 2018-08-06 NOTE — H&P
Hospital Medicine History & Physical Note    Date of Service  8/6/2018    Primary Care Physician  Pcp Pt States None    Consultants  None    Code Status  full    Chief Complaint  Shortness of breath     History of Presenting Illness  38 y.o. male who presented 8/5/2018 with shortness of breath.  This patient has known history of cardiomyopathy likely due to polysubstance abuse.  He has ongoing polysubstance abuse.  He was recently admitted to the hospital about a week ago for similar symptoms.  Which improved after IV diuresis.  He was discharged home with regular heart failure medications.  However the patient has not been able to fill any of his medications and now re-presents with shortness of breath is worse with exertion and lying flat.  He also has history of severe anxiety and when he gets shortness of breath he develops anxiety attacks which worsens his shortness of breath.  He is also ran out of his benzodiazepines that he was discharged with.  He has no known alleviating or exacerbating factors otherwise.    Review of Systems  Review of Systems   Constitutional: Negative for chills and fever.   HENT: Negative for congestion, hearing loss and tinnitus.    Eyes: Negative for blurred vision, double vision and discharge.   Respiratory: Positive for shortness of breath. Negative for cough and hemoptysis.    Cardiovascular: Positive for orthopnea and leg swelling. Negative for chest pain and palpitations.   Gastrointestinal: Negative for abdominal pain, heartburn, nausea and vomiting.   Genitourinary: Negative for dysuria and flank pain.   Musculoskeletal: Negative for joint pain and myalgias.   Skin: Negative for rash.   Neurological: Negative for dizziness, sensory change, speech change, focal weakness and weakness.   Endo/Heme/Allergies: Negative for environmental allergies. Does not bruise/bleed easily.   Psychiatric/Behavioral: Negative for depression, hallucinations and substance abuse.       Past Medical  History   has a past medical history of Congestive heart failure (HCC) and Gastritis.    Surgical History  Reviewed and noncontributory    Family History  family history includes Heart Disease in his father; Stroke in his mother.     Social History   reports that he has been smoking Cigarettes.  He has been smoking about 0.07 packs per day. He has never used smokeless tobacco. He reports that he drinks alcohol. He reports that he uses drugs.    Allergies  No Known Allergies    Medications  Prior to Admission Medications   Prescriptions Last Dose Informant Patient Reported? Taking?   aspirin (ASA) 81 MG Chew Tab chewable tablet new at script Historical No No   Sig: Take 1 Tab by mouth every day.   carvedilol (COREG) 12.5 MG Tab new at script Historical No No   Sig: Take 1 Tab by mouth 2 times a day, with meals.   lisinopril (PRINIVIL) 5 MG Tab new at script Historical No No   Sig: Take 1 Tab by mouth every day.   spironolactone (ALDACTONE) 25 MG Tab new at script Historical No No   Sig: Take 1 Tab by mouth every day.      Facility-Administered Medications: None       Physical Exam  Blood Pressure: 112/80   Temperature: 36.6 °C (97.9 °F)   Pulse: (!) 109   Respiration: 18   Pulse Oximetry: 92 %     Physical Exam   Constitutional: He is oriented to person, place, and time. He appears well-developed and well-nourished.   HENT:   Head: Normocephalic and atraumatic.   Dry oral mucosa   Eyes: Pupils are equal, round, and reactive to light. Conjunctivae and EOM are normal.   Neck: Normal range of motion. Neck supple. No JVD present.   Cardiovascular: Regular rhythm, normal heart sounds and intact distal pulses.    No murmur heard.  tachycardic   Pulmonary/Chest: He is in respiratory distress.   diminished   Abdominal: Soft. Bowel sounds are normal. He exhibits no distension. There is no tenderness.   Musculoskeletal: Normal range of motion. He exhibits no edema.   Neurological: He is alert and oriented to person, place, and  time. No cranial nerve deficit. He exhibits normal muscle tone.   Skin: Skin is warm and dry. No erythema.   Psychiatric:   anxious   Nursing note and vitals reviewed.      Laboratory:  Recent Labs      08/05/18   2350   WBC  8.6   RBC  4.55*   HEMOGLOBIN  12.9*   HEMATOCRIT  40.6*   MCV  89.2   MCH  28.4   MCHC  31.8*   RDW  45.7   PLATELETCT  318   MPV  9.7     Recent Labs      08/05/18   2350   SODIUM  138   POTASSIUM  4.1   CHLORIDE  107   CO2  19*   GLUCOSE  108*   BUN  25*   CREATININE  0.86   CALCIUM  8.9     Recent Labs      08/05/18   2350   ALTSGPT  21   ASTSGOT  19   ALKPHOSPHAT  47   TBILIRUBIN  1.0   GLUCOSE  108*         Recent Labs      08/05/18   2350   BNPBTYPENAT  714*         Lab Results   Component Value Date    TROPONINI 0.02 08/05/2018       Urinalysis:    No results found     Imaging:  DX-CHEST-PORTABLE (1 VIEW)   Final Result      1.  Moderate enlargement of the cardiomediastinal silhouette.   2.  Mild vascular prominence likely representing vascular congestion. This is decreased since the previous study.            Assessment/Plan:  I anticipate this patient is appropriate for observation status at this time.    * Shortness of breath   Assessment & Plan    This patient presents with shortness of breath has a history of diastolic and systolic heart failure secondary to polysubstance abuse.  He does appear to be dry on clinical exam and however appears to be in respiratory distress seems more like anxiety to me as he has no O2 requirement vascular congestion on chest x-ray is improved from prior admission about a week ago.  Patient states he feels overloaded and that's the cause of his shortness of breath.  Will give dose of lasix and assess response.         Chronic combined systolic and diastolic congestive heart failure (HCC)- (present on admission)   Assessment & Plan    Shortness of breath likely due to noncompliance with medications   Will continue with heart failure medications Ace,  BB  Aldactone; will give dose of lasix and assess response  Patient actually seems more on the dry side to me but acts like he is in resp distress no 02 req,. He does have pulmonary vascular congestion on chest x-ray and elevated BNP  Follow clinically, needs Social work for medications         Elevated BUN   Assessment & Plan    Cont to monitor        Lactic acidosis   Assessment & Plan    No signs of infectious etiology   Cont to monitor         Sinus tachycardia- (present on admission)   Assessment & Plan    Sinus tachy seems to be related to his anxiety   Cont ot monitor on telemetry  Meth? Check uds        Generalized anxiety disorder- (present on admission)   Assessment & Plan    Klonopin BID         Polysubstance abuse- (present on admission)   Assessment & Plan    Hx of meth and marajuana usage        Obesity (BMI 35.0-39.9 without comorbidity)- (present on admission)   Assessment & Plan    Encouraged weight loss            VTE prophylaxis: heparin

## 2018-08-06 NOTE — PROGRESS NOTES
When patient is awake, patient is anxious and trying to catch his breathe. 98% in room air. Talk to patient to relax, lungs are clear. Sinus Tachycardia. 108  BPM pulse.

## 2018-08-06 NOTE — ASSESSMENT & PLAN NOTE
- Patient's history of methamphetamine is what caused him to have an ejection fraction of only 20%.  - Patient counseled regarding illicit drug use  - Patient tends to get agitated when speaking about his drug abuse and states his cardiomyopathy is due to him drinking energy drinks.

## 2018-08-06 NOTE — PROGRESS NOTES
Assumed patient care. Patient is asleep. Monitor in placed Sinus Tachycardia. Update his Care plan board.

## 2018-08-06 NOTE — ASSESSMENT & PLAN NOTE
- Controlled with current pain medication regimen  - Has history of anxiety, however not currently on medication at home and due to previously explained circumstances   - Will attempt nonpharmacological measures and reassess for need if anxiety persists

## 2018-08-06 NOTE — CARE PLAN
Problem: Psychosocial Needs:  Goal: Level of anxiety will decrease  Outcome: PROGRESSING AS EXPECTED  Discuss relaxation technique .

## 2018-08-06 NOTE — ASSESSMENT & PLAN NOTE
- LVEF 20%  - Acute on chronic  - Systolic and diastolic dysfunction  - 2nd to drug abuse  - Continue Coreg, Lisinopril, Lasix, aldactone

## 2018-08-06 NOTE — PROGRESS NOTES
"Pt reports unable to fill his medications on his last hospital visit due to \"no car and I cannot walk\".  States he walks only a few steps and then have to stop due to shortness of breath.  \"if I have a wheelchair then I can roll myself to the pharmacy\". Pt does move all extremity but does have generalized weakness. States he fell \"about a month ago in the cafeteria here\". He is aware that his last EF was <20%.   "

## 2018-08-06 NOTE — PROGRESS NOTES
Patient complain SOB. Re-check patient again. 104 BPM and 97% in room air. Continue relaxation technique . Call light with in reach. Lungs sounds clear.

## 2018-08-06 NOTE — ED PROVIDER NOTES
ED Provider Note    Scribed for Michele Curiel M.D. by Neo Pearce. 8/5/2018, 11:50 PM.    Primary care provider: Pcp Pt States None  Means of arrival: Ambulance  History obtained from: Patient  History limited by: None     CHIEF COMPLAINT  Chief Complaint   Patient presents with   • Difficulty Breathing   • Weakness       HPI  Ryan Arteaga is a 38 y.o. male who presents to the Emergency Department for evaluation of intermittent shortness of breath. Patient was last seen in the ED on 7/28/18 secondary to the same complaint. The patient reports his shortness of breath is worsened while walking. He also went to [place] on 7/30/18 where he was informed of needing a heart transplant, and reports panicking due to it. The patient denies fever. Patient confirms a medical history of congestive heart failure. He was last seen by Dr. Galvan, Cardiologist, in 2016. The patient states he is unable to work secondary to his heart problems. Per patient, his heart failure may be secondary to excessive energy-drink consumption. The patient is currently living with his parents. Patient denies taking alcohol or any drugs.    REVIEW OF SYSTEMS  Positive for shortness of breath and panic. Negative for fever. See HPI for further details. All other systems are negative. C.     PAST MEDICAL HISTORY   has a past medical history of Congestive heart failure (HCC) and Gastritis.    SURGICAL HISTORY  patient denies any surgical history    SOCIAL HISTORY  Social History   Substance Use Topics   • Smoking status: Light Tobacco Smoker     Packs/day: 0.07     Types: Cigarettes   • Smokeless tobacco: Never Used   • Alcohol use Yes      Comment: social      History   Drug Use     Comment: currently uses THC pen, hx of methamphetamine use       FAMILY HISTORY  Family History   Problem Relation Age of Onset   • Stroke Mother    • Heart Disease Father        CURRENT MEDICATIONS  Reviewed.  See Encounter Summary.     ALLERGIES  No Known  Allergies    PHYSICAL EXAM  VITAL SIGNS: /80   Pulse (!) 105   Temp 36.6 °C (97.9 °F)   Resp 20   Ht 1.829 m (6')   Wt 104 kg (229 lb 4.5 oz)   SpO2 98%   BMI 31.10 kg/m²   Constitutional: Alert in no apparent distress. Anxious.   HENT: No signs of trauma, Bilateral external ears normal. Dry mucus membranes.   Eyes: Pupils are equal and reactive, Conjunctiva normal, Non-icteric.   Neck: Normal range of motion, No tenderness, Supple, No stridor.   Lymphatic: No lymphadenopathy noted.   Cardiovascular: Regular rate and rhythm, no murmurs.   Thorax & Lungs: Normal breath sounds, No respiratory distress, No wheezing, No chest tenderness.   Abdomen: Bowel sounds normal, Soft, No tenderness, No masses, No pulsatile masses. No peritoneal signs.  Skin: Warm, Dry, No erythema, No rash.   Back: No bony tenderness, No CVA tenderness.   Extremities: Intact distal pulses, No edema, No tenderness, No cyanosis  Musculoskeletal: Good range of motion in all major joints. No tenderness to palpation or major deformities noted.   Neurologic: Alert , Normal motor function, Normal sensory function, No focal deficits noted.   Psychiatric: Affect normal, Judgment normal. Anxious.       DIAGNOSTIC STUDIES / PROCEDURES     LABS  Results for orders placed or performed during the hospital encounter of 08/05/18   CBC w/ Differential   Result Value Ref Range    WBC 8.6 4.8 - 10.8 K/uL    RBC 4.55 (L) 4.70 - 6.10 M/uL    Hemoglobin 12.9 (L) 14.0 - 18.0 g/dL    Hematocrit 40.6 (L) 42.0 - 52.0 %    MCV 89.2 81.4 - 97.8 fL    MCH 28.4 27.0 - 33.0 pg    MCHC 31.8 (L) 33.7 - 35.3 g/dL    RDW 45.7 35.9 - 50.0 fL    Platelet Count 318 164 - 446 K/uL    MPV 9.7 9.0 - 12.9 fL    Neutrophils-Polys 56.60 44.00 - 72.00 %    Lymphocytes 31.10 22.00 - 41.00 %    Monocytes 9.10 0.00 - 13.40 %    Eosinophils 2.00 0.00 - 6.90 %    Basophils 0.90 0.00 - 1.80 %    Immature Granulocytes 0.30 0.00 - 0.90 %    Nucleated RBC 0.00 /100 WBC    Neutrophils  (Absolute) 4.87 1.82 - 7.42 K/uL    Lymphs (Absolute) 2.68 1.00 - 4.80 K/uL    Monos (Absolute) 0.78 0.00 - 0.85 K/uL    Eos (Absolute) 0.17 0.00 - 0.51 K/uL    Baso (Absolute) 0.08 0.00 - 0.12 K/uL    Immature Granulocytes (abs) 0.03 0.00 - 0.11 K/uL    NRBC (Absolute) 0.00 K/uL   Complete Metabolic Panel (CMP)   Result Value Ref Range    Sodium 138 135 - 145 mmol/L    Potassium 4.1 3.6 - 5.5 mmol/L    Chloride 107 96 - 112 mmol/L    Co2 19 (L) 20 - 33 mmol/L    Anion Gap 12.0 (H) 0.0 - 11.9    Glucose 108 (H) 65 - 99 mg/dL    Bun 25 (H) 8 - 22 mg/dL    Creatinine 0.86 0.50 - 1.40 mg/dL    Calcium 8.9 8.5 - 10.5 mg/dL    AST(SGOT) 19 12 - 45 U/L    ALT(SGPT) 21 2 - 50 U/L    Alkaline Phosphatase 47 30 - 99 U/L    Total Bilirubin 1.0 0.1 - 1.5 mg/dL    Albumin 4.2 3.2 - 4.9 g/dL    Total Protein 7.0 6.0 - 8.2 g/dL    Globulin 2.8 1.9 - 3.5 g/dL    A-G Ratio 1.5 g/dL   Btype Natriuretic Peptide   Result Value Ref Range    B Natriuretic Peptide 714 (H) 0 - 100 pg/mL   Troponin STAT   Result Value Ref Range    Troponin I 0.02 0.00 - 0.04 ng/mL   LACTIC ACID   Result Value Ref Range    Lactic Acid 2.3 (H) 0.5 - 2.0 mmol/L   LACTIC ACID   Result Value Ref Range    Lactic Acid 1.7 0.5 - 2.0 mmol/L   ESTIMATED GFR   Result Value Ref Range    GFR If African American >60 >60 mL/min/1.73 m 2    GFR If Non African American >60 >60 mL/min/1.73 m 2   TSH (Thyroid Stimulating Hormone)   Result Value Ref Range    TSH 1.040 0.380 - 5.330 uIU/mL   Hemoglobin A1c   Result Value Ref Range    Glycohemoglobin 5.8 (H) 0.0 - 5.6 %    Est Avg Glucose 120 mg/dL   URINE DRUG SCREEN   Result Value Ref Range    Amphetamines Urine Negative Negative    Barbiturates Negative Negative    Benzodiazepines Positive (A) Negative    Cocaine Metabolite Negative Negative    Methadone Negative Negative    Opiates Negative Negative    Oxycodone Negative Negative    Phencyclidine -Pcp Negative Negative    Propoxyphene Negative Negative    Cannabinoid Metab  Positive (A) Negative   LACTIC ACID   Result Value Ref Range    Lactic Acid 1.9 0.5 - 2.0 mmol/L   D-DIMER   Result Value Ref Range    D-Dimer Screen 209 <250 ng/mL(D-DU)   BTYPE NATRIURETIC PEPTIDE   Result Value Ref Range    B Natriuretic Peptide 598 (H) 0 - 100 pg/mL   EKG (ER)   Result Value Ref Range    Report       [facility] Emergency Dept.    Test Date:  2018  Pt Name:    REINA QIU                Department: ER  MRN:        [id number]                      Room:  Gender:     Male                         Technician: [id number]  :        1980                   Requested By:ER TRIAGE PROTOCOL  Order #:    [id number]                    Reading MD:    Measurements  Intervals                                Axis  Rate:       104                          P:          56  KY:         156                          QRS:        -31  QRSD:       98                           T:          73  QT:         384  QTc:        506    Interpretive Statements  SINUS TACHYCARDIA  PROBABLE LEFT ATRIAL ABNORMALITY  LEFT AXIS DEVIATION  LEFT VENTRICULAR HYPERTROPHY  PROLONGED QT INTERVAL  Compared to ECG 2018 05:34:40  Left-axis deviation now present     All labs were reviewed by me.    EKG  12 Lead EKG interpreted by me to show:  Sinus rhythm  Rate 104  Axis: Left  Intervals: Prolonged QTC  Nonspecific ST changes, LVH  LVH compared to prior 2018    RADIOLOGY  DX-CHEST-PORTABLE (1 VIEW)   Final Result      1.  Moderate enlargement of the cardiomediastinal silhouette.   2.  Mild vascular prominence likely representing vascular congestion. This is decreased since the previous study.      The radiologist's interpretation of all radiological studies and images have been reviewed by me.    COURSE & MEDICAL DECISION MAKING  Pertinent Labs & Imaging studies reviewed. (See chart for details)      11:50 PM - Patient seen and examined at bedside. Patient will be treated with Aspirin Chewable 324 mg, Ativan 1  mg, Aspirin suppository 300 mg. Ordered EKG to evaluate his symptoms. Patient will additionally receive 1L NS secondary to dry mucus membranes and tachycardia.     1:09 AM - Reviewed patient's records obtained from [place] which show the admission comments on EF 20% secondary to severe cardiomegaly secondary to polysubstance abuse and medication noncompliance.    2:20 AM - Reviewed patient's radiology and lab results.     2:30 AM - Consulted Dr. Robledo, Hospitalist, who agrees to admit patient.      Decision Making:  This is a 38 y.o. year old male who presents with recurrent shortness of breath and anxiety.  Chart review both from [facility] as well as [place] records as required show recurrent medication noncompliance.  Patient still has yet to fill medications as provided at discharge either from [facility] or [place].  He left AMA from his most recent admission here.  With attempt at admission at [place] the patient was seen by hospitalist and ultimately discharged to home with outpatient follow-up scheduled.  At this point I do believe that the patient has a stronger component of anxiety rather than CHF although there are still clinical exam findings consistent with ongoing CHF exacerbation.  The patient continues to have subjective dyspnea on exertion.  Given the above findings I want the patient brought into the hospital service tonight for ongoing inpatient care.  Of note the patient does clinically appear dehydrated with dry mucous membranes and he has improved with slow IV hydration although this was completed at such a manner due to primary concern for CHF.    DISPOSITION:  Patient will be admitted to Dr. Robledo, Hospitalist, in guarded condition.      FINAL IMPRESSION  1. Acute on chronic congestive heart failure, unspecified heart failure type (HCC)    2. WELLS (dyspnea on exertion)    3. Anxiety    4. Chronic combined systolic and diastolic congestive heart failure (HCC)          Neo FULTON  (Scribe), am scribing for, and in the presence of, Michele Curiel M.D..    Electronically signed by: Neo Pearce (Scribe), 8/5/2018    IMichele M.D. personally performed the services described in this documentation, as scribed by Neo Pearce in my presence, and it is both accurate and complete.    The note accurately reflects work and decisions made by me.  Michele Curiel  8/6/2018  11:47 PM

## 2018-08-06 NOTE — DISCHARGE PLANNING
Hospice choice form presented to patient .  Patient uncertain of which Hospice to choose so blanket form will be sent out.  Choice form faxed to [facility].

## 2018-08-06 NOTE — PROGRESS NOTES
Pt admitted to room T209 from ER at 0245.  Pt A&o, anxious, moans and groans. Complaints of SOB, dyspnea on exertion and hyperventilating. Instructed to take slow deep breaths and use purse lip technique. On 92% on ra, placed on 2L o2 nc for comfort and will wean as able. Generalized pain reported at 2 on a scale of 0-10. Oriented to room call light and vitals frequency.  -105 on monitor. Reviewed plan of care (equipment, medications, activity, diet, fall precautions, skin care, labs, procedure and pain management) with patient. Passed RN bedside swallow evaluation without s/sx of aspiration. All questions answered. Verbalized understanding and agrees. Able to make needs known.

## 2018-08-06 NOTE — DISCHARGE PLANNING
Received Choice Form @ 6408  Sent to [facility], [place], [place], [place]  Faxed @ [phone]

## 2018-08-06 NOTE — CARE PLAN
Problem: Safety  Goal: Will remain free from injury  Outcome: PROGRESSING AS EXPECTED  Pt has generalized weakness. Instructed to use call light prior to getting oob. Verbalized understanding and agrees.     Problem: Respiratory:  Goal: Respiratory status will improve  Outcome: PROGRESSING AS EXPECTED  Pt was tachypneic earlier today. Resting now and appears breathing is unlabored. Will continue to wean oxygen as able.

## 2018-08-06 NOTE — PROGRESS NOTES
"Pt has nonproductive cough. States he has not slept for \"days\" and wants medicine to relax him. Klonopin given as scheduled.   "

## 2018-08-06 NOTE — ASSESSMENT & PLAN NOTE
Takeoff the telemetric monitoring patient's going to be comfortable and he will be transferred to the medical floor

## 2018-08-06 NOTE — ASSESSMENT & PLAN NOTE
Shortness of breath at this point is secondary to his end-stage combined heart failure. The patient at this point will be continued on pain management and anxiety management.

## 2018-08-07 PROBLEM — Z51.5 COMFORT MEASURES ONLY STATUS: Status: ACTIVE | Noted: 2018-01-01

## 2018-08-07 PROBLEM — G93.40 ENCEPHALOPATHY: Status: RESOLVED | Noted: 2018-01-01 | Resolved: 2018-01-01

## 2018-08-07 PROBLEM — R00.0 SINUS TACHYCARDIA: Status: RESOLVED | Noted: 2018-01-01 | Resolved: 2018-01-01

## 2018-08-07 PROBLEM — E87.1 HYPONATREMIA: Status: RESOLVED | Noted: 2018-01-01 | Resolved: 2018-01-01

## 2018-08-07 NOTE — DISCHARGE PLANNING
Agency/Facility Name: [facility]  Correspondence  Outcome: Patient declined, Non-contracted insurance provider.

## 2018-08-07 NOTE — PROGRESS NOTES
Pt's sister updated with plan of care with patient's permission. Patient and sister talking on the phone right now.

## 2018-08-07 NOTE — HOSPICE
Went to assess pt and unable to have a conversation about hospice with pt due to his most recent dose of 10 mg of morphine. Attempted to wake him however he was not able to stay awake to be involved with the conversation. Sundar was informed and will try and round back with pt tomorrow.

## 2018-08-07 NOTE — DISCHARGE PLANNING
Received Choice form at 1851  Agency/Facility Name: [facility]  Referral sent per Choice form @ 1440

## 2018-08-07 NOTE — DISCHARGE PLANNING
Agency/Facility Name: [facility]  Spoke To: Tashia  Outcome: They will have a hospice nurse bedside at 1000 on 08-07-18. ER HORCAE Gomez has been advised.

## 2018-08-07 NOTE — DISCHARGE PLANNING
"Care Transition Team Assessment    Assessment limited as pt very drowsy although he intermittently will wake up and have a conversation for a minute or so. According to pt he cannot return to his parents home as he is not speaking with them. Has been seen by [place] [facility] hospice RN who said he did not meet criteria and had concerns regarding his compliance. Meli from [facility] has reviewed pt's history and will call back with an update. Discussed with pt his discharge plan, pt said he had no money. According to pt prior to hospital admission he had been receiving unemployment but this was stopped when he was admitted to hospital. Provided pt with resources for homeless, he said that he had been to shelter before and did not want to return there. Pt was unable to come up with any plan regarding his plans at discharge, he kept repeating he wanted to go on hospice. Informed Liseth EVERETT. CM will continue to follow for needs, have reached out to ER SW for assistance with further resources.    Information Source  Orientation : Disoriented to Place, Disoriented to Person, Disoriented to Event  Information Given By: Patient  Informant's Name:     Readmission Evaluation  Is this a readmission?: Yes - unplanned readmission  Why do you think you were readmitted?: \"I could not breath\"  Was an appointment arranged for you prior to discharge?: Yes, did not attend appointment  Why didn't you go to your appointment?: Didn't know where to go  Were there new prescriptions you were supposed to fill after you were discharged?: Yes, did not fill prescriptions  Why didn't you fill your prescription?: Other (comment) (\"I had no money\")  Did you understand your discharge instructions?: Yes  Did you have enough support after your last discharge?:  (Pt did not respond)    Interdisciplinary Discharge Planning  Does Admitting Nurse Feel This Could be a Complex Discharge?: Yes  Primary Care Physician: none  Lives with - Patient's " Self Care Capacity: Parents (unable to return to parents)  Patient or legal guardian wants to designate a caregiver (see row info): No  Support Systems: Parent (Pt said he cannot return to his parents home)  Housing / Facility: Mobile Home  Do You Take your Prescribed Medications Regularly: No (Did not fill prescriptions)  Reasons Why Not Taking Medications : Financial Reasons  Able to Return to Previous ADL's: Other (Unable to assess, complains of weakness)  Mobility Issues: Yes (generalised weakness)  Prior Services: None  Patient Expects to be Discharged to:: Likely shelter  Assistance Needed: Unknown at this Time (Unable to determine as pt drowsy)  Durable Medical Equipment: Not Applicable    Discharge Preparedness  What are your discharge supports?: Other (comment) (currently none as not speaking with parents)  Prior Functional Level: Independent with Activities of Daily Living, Ambulatory  Difficulity with ADLs: None  Difficulity with IADLs: Managing medication, Keeping track of finances    Functional Assesment  Prior Functional Level: Independent with Activities of Daily Living, Ambulatory    Finances  Financial Barriers to Discharge: Yes  Prescription Coverage: Yes  Discharge Risks or Barriers  Discharge risks or barriers?: No PCP, Substance abuse, Homeless / couch surfing  Patient risk factors: Homeless, Noncompliance, No PCP, Substance abuse    Anticipated Discharge Information  Anticipated discharge disposition: Shelter  Discharge Contact Phone Number: [phone]

## 2018-08-07 NOTE — DISCHARGE PLANNING
Telephoned VANCE from [facility] an hour ago and left voice message for call back to determine if pt had bed at SNF.    Telephoned VANCE again and she advised this writer that would contact [facility] again. VANCE advised that she had already left message at [facility] and [facility] currently  waiting for response. VANCE confirmed she will telephone both [facility] and [facility] again and call this writer with their response.

## 2018-08-07 NOTE — DISCHARGE PLANNING
Agency/Facility Name: [facility]  Spoke To: [facility]  Outcome: Patient declined, non-insurance provider.

## 2018-08-07 NOTE — DISCHARGE PLANNING
Agency/Facility Name: [facility]  Correspondence  Outcome: Patient Accepted    Agency/Facility Name: [facility]  Spoke To: Rachel  Outcome: Attempted to get status on referral, no answer. Left message.

## 2018-08-07 NOTE — DISCHARGE PLANNING
VANCE from [facility] met with pt. According to AJ pt has Medicaid FFS and said pt had agreed to referral to SNF. Pt provided with choice form, signed form sent to [facility], pt chose [facility].

## 2018-08-07 NOTE — DISCHARGE PLANNING
Spoke with VANCE who said that she had spoken with Dianna, representative from [facility] who needed to speak with admissions at facility. VANCE suggested trying another SNF, provided pt with choice and he signed form for [facility] care. Ref[facility] faxed to [facility] currently with [facility]. Pt's mother at bedside.

## 2018-08-07 NOTE — PROGRESS NOTES
"Pt's mom Sonia called and spoke wiith this RN and patient. States she is about \"4 hours away\" and will come visit today. I did update her that hospice will be here today around 10am.  She was asking if \"prognosis is better\". I educated mom how CHF is manageable but not curable and with his history of noncompliance and substance abuse hx, he is not a candidate for heart transplant. I explained to mom that last night, patient have episodes where he is awake, talking, eating but also gets very SOB, anxious, restless and was throwing up this morning. Reiterate that meds are given throughout the night to make him comfortable.  She verbalized understanding.   "

## 2018-08-07 NOTE — DISCHARGE PLANNING
Received Choice form at 8532  Agency/Facility Name: [facility] [place]  Referral sent per Choice form @ 6416

## 2018-08-07 NOTE — HOSPICE
Non admit per Dr. Barillas after review of pts medical Hx and does not meet Criteria for hospice. Talked to Mari Swain RN CM and she stated that she understands and [facility] refussed as well

## 2018-08-07 NOTE — HEART FAILURE PROGRAM
Cardiovascular Nurse Navigator () Advanced Heart Failure Program Inpatient Progress Note:    Please note, on 's most recent admission (where he was admitted because he was found in the cafeteria sleeping after a discharge and having refused to allow his discharging nurse to take him out in a wheelchair), he left AMA.     told the care coordinator assistant this morning in the CDU that he hasn't filled his prescriptions because he has no money.  has Medicaid which pays for HF medications completely.     There may be some other issue, such as transportation to the pharmacy, but the issue is NOT affordability, medications do not get more affordable than no out of pocket cost.    Psychiatry was following  on this prior admission and recommended inpatient psych placement after acute care but again, he left AMA.    On 's last visit with the [facility] clinic in June of 2016, Dr. Galvan ordered a repeat echo to see if he was a candidate for a device, no echocardiograms or further appointments on record until July of 2018 when  was admitted to acute care.    Now, with 's ongoing non compliance with GDMT, he is not a candidate for a device even though his EF is 20%.    Hospice evaluated  today and he was found to not meet criteria. With that said, not being a candidate for hospice at this time does not preclude completion of a POLST so that 's wishes follow him in the community as well as in the hospital.      has estranged relations with his next of kin (parents) which would make for a very difficult situation if he were unable to make his own decisions for care in the future which with an EF of 20% is not an unimaginable scenario.    Thank you and please call with questions, Concetta

## 2018-08-07 NOTE — PROGRESS NOTES
Seen by MD and discuss Discharged plan. Did call Mother and on the way to [place] coming from [place].

## 2018-08-07 NOTE — PROGRESS NOTES
Pt had large emesis of 400ml brown/undigested food. Reports feeling better after. He is looking through his phone and messages. He talks about how he misses his daughter Camila.

## 2018-08-07 NOTE — DISCHARGE PLANNING
Agency/Facility Name: [facility]  Spoke To: Juliette  Outcome: Attempting to get status on referral, Rachel referred me to AJ no answer from AJ left message.

## 2018-08-07 NOTE — PROGRESS NOTES
"Pt sleepy, arousable. Still having SOB at times with exertion. Pt gave this RN permission to call his mom for update. Mom Sonia notified and had a short conversation regarding hospice and comfort care with patient on the phone. Sonia is currently out of state for \"a week\" but she will try and call the patient's dad if he can come visit. Phone number for the unit is given if they have any questions. Sonia states she will call tomorrow again for update.   "

## 2018-08-07 NOTE — PROGRESS NOTES
Transfer patient to T218. Have his own bathroom and bigger space. Got all of his belongings. Up on recliner and resting.

## 2018-08-07 NOTE — PROGRESS NOTES
Refused to have Ativan, request for Full morphine dose. Discuss Comfort care with Charge RN at bedside for patient education. Got his hospital gown . Discuss about safety. Refused wearing a socks.

## 2018-08-07 NOTE — PROGRESS NOTES
"Pt ambulated on hallway with one person assist. Unsteady and drowsy. Ate dinner without s/sx of aspiration noted. He states he is happy that he feels better and can breathe better. He did mention that he is happy he spoke with his family but sad that they are \"judging me with what I did in one year\".    "

## 2018-08-08 PROBLEM — R11.0 NAUSEA: Status: ACTIVE | Noted: 2018-01-01

## 2018-08-08 NOTE — DISCHARGE PLANNING
Spoke with VANCE by phone who said that she had spoken with Dianna recently and Dianna informed VANCE that she was still waiting for to speak with her .     This writer left voice message for supervisor Nisreen Gaspar to discuss pt.

## 2018-08-08 NOTE — PROGRESS NOTES
Seen pt, AOx 4. C/o generalized pain and nausea. Plan of care discussed includes Safety, pain management and continue medications while waiting danyell placement/disposition and pt understands.    Pt refusing IV insertion.

## 2018-08-08 NOTE — RESPIRATORY CARE
COPD EDUCATION by COPD CLINICAL EDUCATOR  8/8/2018 at 6:40 AM by Emily Mclaen     Patient reviewed by COPD education team. Patient does not qualify for COPD program.

## 2018-08-08 NOTE — DISCHARGE PLANNING
As pt not being accepted at SNF, [facility]. Voice message left for VANCE of [facility] who is working with Dianna, representative for [facility] to call this writer.    Met with pt and informed him of difficulty on finding accepting facility. Pt agreed this writer could call his Mum. Telephoned pt's Mum Sonia, who said that she was not willing to take him home. Sonia went on to say that  knew why she would not let him return home. According to Sonia pt had lived with his parents for 8-9 years on and off.

## 2018-08-08 NOTE — PROGRESS NOTES
I have personally seen and examined / evaluated the patient, discussed the patient's evaluation & management plan with ALBERT Way and I have reviewed the note below.     I agree with the findings, history, examination and assessment / plan as listed below with changes/addendum as listed below by me in my addendum / attestation separetely.     Underlying systolic congestive heart failure, noncompliant.  Transition to comfort measures yesterday by Dr. Hall.     Per /case management, complex disposition.  Hospice team has been involved.  Patient evaluated, discussed with patient given difficulty of hospice placement, not a hospice candidate per report from hospice team to be discharged home.  At this time patient started acting goofy, inappropriate.  Similar to what happened upon my recent admission H&P and evaluation for this patient.  Overall pattern highly consistent with malingering, in order to refuse discharge.  Extremely sedated on medication this morning, no acute need for narcotics.  Not imminently dying.  Drug-seeking behavior, narcotic seeking and benzodiazepine seeking behavior.  I had an extensive discussion with this patient, discussed his wishes.  Discussed if he wants to continue ongoing comfort measures/ongoing management or what his preferences are.  Later in the day another extensive discussion in front of his wife and daughter.  Patient reports is not ready to go home.  Has nowhere to go home.  Has poor social support.  Reports he will take his medication and wants ongoing pain medications for comfort.  He reports that he wants to maintain comfort measures CODE STATUS and wants to continue therapy for his cardiac conditions with oral medications only.    At this time we will maintain this patient on aspirin 81, carvedilol, Lasix 20, lisinopril 5 and Aldactone 25.    Nausea control would be maintained.    I do not see an acute need for ongoing pain control in this patient, he is  not imminently dying at this point in time he was profoundly sedated this morning with a profound drug-seeking behavior.  I have ordered as needed Tylenol and tramadol for pain control.  Which should be appropriate at this point in time.  This has been discussed with the patient.  I have informed the patient will continue to titrate his therapy as clinically appropriate.    Overall remains a complex disposition, not appropriate for CDU level of care at this point in time.  Discussed with bed control, pursue transfer to medical regular nursing floor.    Time spend: 40 minutes FTF with patient, numerous evaluations. Discussion with patient / family.     Argelia Ordoñez M.D.  08/07/18  6:58 PM

## 2018-08-08 NOTE — PROGRESS NOTES
[facility] Hospitalist Progress Note    Date of Service: 8/8/2018    Chief Complaint  38 y.o. male admitted 8/5/2018 with shortness of breath. Patient initially was admitted because of worsening congestive heart failure. The heart failure is acute and biventricular [place] class IV, and patient is found to have a EF of 20%.  The patient wanted a heart transplant, however due to the fact that the patient abuses methamphetamines he cannot get a heart transplant at this point in time, and on admission requesting to be placed Comfort Care measures only.    Interval Problem Update  -Patient today slightly more awake but continued drowsiness with only tramadol and Tylenol for pain due to significant drowsiness and inability to stay awake yesterday.  Patient appears in no acute distress upon assessment however does state nausea with vomiting twice after eating yesterday and today.  -KUB ordered to rule out obstruction and Reglan started as needed to help with nausea.  -Case management and social work involved extensively with upper management/administration aware of situation and pending recommendations as far as placement at this time.    Consultants/Specialty  Hospice    Disposition  Pending placement -difficult disposition, not hospice candidate.        Review of Systems   Constitutional: Positive for malaise/fatigue. Negative for chills, diaphoresis, fever and weight loss.   HENT: Negative.    Eyes: Negative.  Negative for blurred vision and double vision.   Respiratory: Positive for shortness of breath. Negative for cough, hemoptysis, sputum production and wheezing.    Cardiovascular: Positive for orthopnea. Negative for chest pain, palpitations, claudication, leg swelling and PND.   Gastrointestinal: Positive for nausea and vomiting. Negative for abdominal pain, blood in stool, constipation, diarrhea, heartburn and melena.   Genitourinary: Negative for dysuria, flank pain, frequency, hematuria and urgency.    Musculoskeletal: Negative for back pain, falls, joint pain, myalgias and neck pain.   Skin: Negative.  Negative for itching and rash.   Neurological: Positive for dizziness and weakness. Negative for tingling, tremors, sensory change, speech change, focal weakness, seizures, loss of consciousness and headaches.   Endo/Heme/Allergies: Negative.  Negative for environmental allergies and polydipsia. Does not bruise/bleed easily.   Psychiatric/Behavioral: Positive for depression and substance abuse. Negative for hallucinations, memory loss and suicidal ideas. The patient is nervous/anxious and has insomnia.    All other systems reviewed and are negative.     Physical Exam  Laboratory/Imaging   Hemodynamics  Temp (24hrs), Av.3 °C (97.3 °F), Min:36.1 °C (96.9 °F), Max:36.8 °C (98.2 °F)   Temperature: 36.1 °C (96.9 °F)  Pulse  Av.2  Min: 78  Max: 111   Blood Pressure: 100/64      Respiratory      Respiration: 20, Pulse Oximetry: 100 %        RUL Breath Sounds: Diminished, RML Breath Sounds: Diminished, RLL Breath Sounds: Diminished, RENE Breath Sounds: Diminished, LLL Breath Sounds: Diminished    Fluids    Intake/Output Summary (Last 24 hours) at 18 1557  Last data filed at 18 2050   Gross per 24 hour   Intake              120 ml   Output                0 ml   Net              120 ml       Nutrition  Orders Placed This Encounter   Procedures   • Diet Order Cardiac, 2 Gram Sodium     Standing Status:   Standing     Number of Occurrences:   1     Order Specific Question:   Diet:     Answer:   Cardiac [6]     Order Specific Question:   Diet:     Answer:   2 Gram Sodium [7]     Physical Exam   Constitutional: He is oriented to person, place, and time. Vital signs are normal. He appears well-developed and well-nourished. He is sleeping. No distress.   Sleepy but arousable   HENT:   Head: Normocephalic and atraumatic.   Eyes: Pupils are equal, round, and reactive to light. Conjunctivae and EOM are normal.    Neck: Normal range of motion. Neck supple. JVD present. No thyromegaly present.   Cardiovascular: Normal rate, regular rhythm and intact distal pulses.    Murmur heard.  Pulmonary/Chest: He has no wheezes. He has no rales. He exhibits no tenderness.   Abdominal: Soft. He exhibits no distension and no mass. There is no tenderness. There is no rebound and no guarding.   Musculoskeletal: Normal range of motion. He exhibits edema. He exhibits no tenderness.   Lymphadenopathy:     He has no cervical adenopathy.   Neurological: He is oriented to person, place, and time. No cranial nerve deficit.   Skin: Skin is warm and dry. No rash noted. No erythema.   Multiple tattoos    Psychiatric: His mood appears anxious. His affect is labile. His speech is delayed and slurred. He is slowed. Cognition and memory are normal. He expresses impulsivity and inappropriate judgment. He is inattentive.   Nursing note and vitals reviewed.      Recent Labs      08/05/18   2350   WBC  8.6   RBC  4.55*   HEMOGLOBIN  12.9*   HEMATOCRIT  40.6*   MCV  89.2   MCH  28.4   MCHC  31.8*   RDW  45.7   PLATELETCT  318   MPV  9.7     Recent Labs      08/05/18   2350   SODIUM  138   POTASSIUM  4.1   CHLORIDE  107   CO2  19*   GLUCOSE  108*   BUN  25*   CREATININE  0.86   CALCIUM  8.9         Recent Labs      08/05/18   2350  08/06/18   0849   BNPBTYPENAT  714*  598*              Assessment/Plan     Chronic combined systolic and diastolic congestive heart failure (HCC)- (present on admission)   Assessment & Plan    No current issues  - patient has an ejection fraction of 20%, initially requesting comfort care measures only however due to patient being indecisive and inconsistent behaviors, along with patient not being a hospice candidate  will continue medications in order to help optimize heart function as best as possible while placement is being obtained in order for him to be comfortable.    -At this time we will maintain this patient on aspirin 81mg  p.o. daily, carvedilol 3.125 p.o. twice daily, Lasix 20mg p.o. daily, lisinopril 5mg p.o. daily and Aldactone 25mg po daily  -Oxygen as needed for comfort        Nausea   Assessment & Plan    Today patient with continued nausea and intermittent vomiting after eating.  Patient with good bowel sounds no tenderness on palpation to abdomen with last BM on 8/7/2018 which was liquid in consistency.    -KUB ordered to rule out obstruction due to chronic narcotic use  -Antiemetics as needed  -Reglan added as needed before meals        Comfort measures only status   Assessment & Plan    Patient is a complex disposition.  Hospice team has been involved.  Patient evaluated, discussed with patient given difficulty of hospice placement, not a hospice candidate per report from hospice team to be discharged home.  At this time patient started acting goofy, inappropriate.  Similar to what happened upon my recent admission H&P and evaluation for this patient.  Overall pattern highly consistent with malingering, in order to refuse discharge.        Today slightly still sleepy, with no acute need for narcotics.  Not imminently dying.  Drug-seeking behavior, narcotic seeking and benzodiazepine seeking behavior.  Extensive discussion with this patient, discussed his wishes.  Discussed if he wants to continue ongoing comfort measures/ongoing management or what his preferences are.  Later in the day another extensive discussion in front of his wife and daughter.  Patient reports is not ready to go home.  Has nowhere to go home.  Has poor social support.  Reports he will take his medication and wants ongoing pain medications for comfort.  He reports that he wants to maintain comfort measures CODE STATUS and wants to continue therapy for his cardiac conditions with oral medications only.            Elevated BUN   Assessment & Plan    At this point we are not continuing with monitoring of his BUN.        Shortness of breath   Assessment &  Plan    Shortness of breath at this point is secondary to his end-stage combined heart failure. The patient at this point will be continued on pain management and anxiety management.        Lactic acidosis   Assessment & Plan    We are no longer going to monitor at this point his lactic acid levels he is at this point elected to go on comfort care.        Generalized anxiety disorder- (present on admission)   Assessment & Plan    Controlled with current pain medication regimen - patient with history of anxiety, however not currently on medication at home and due to previously explained circumstances will attempt nonpharmacological measures and reassess for need if anxiety persists         Polysubstance abuse- (present on admission)   Assessment & Plan    Patient's history of methamphetamine is what caused him to have an ejection fraction of only 20%.        Obesity (BMI 30.0-34.9)- (present on admission)   Assessment & Plan    At this point BMI currently is 30.8          Quality-Core Measures   Reviewed items::  Medications reviewed and Radiology images reviewed  Dai catheter::  No Dai  DVT prophylaxis pharmacological::  Not indicated at this time, ambulatory  DVT prophylaxis - mechanical:  Not indicated at this time, ambulatory  Ulcer Prophylaxis::  Not indicated  Assessed for rehabilitation services:  Patient returned to prior level of function, rehabilitation not indicated at this time          JESSE Finnegan

## 2018-08-08 NOTE — DISCHARGE PLANNING
Telephoned VANCE ([facility]) to determine if Dianna had responsed whether pt is accepted at [facility]. VANCE said that she had spoken with Dianna who was still waiting to speak with admissions at [facility] who have been in meeting all morning. VANCE confirmed she will call when she hears from Dianna.

## 2018-08-08 NOTE — DISCHARGE SUMMARY
Discharge Summary    CHIEF COMPLAINT ON ADMISSION  Chief Complaint   Patient presents with   • Shortness of Breath       Reason for Admission  other     Admission Date  7/28/2018    CODE STATUS  Prior    HPI & HOSPITAL COURSE  This is a 38 y.o. male with severe chronic systolic/diastolic heart failure with EF 20% secondary to polysubstance abuse and medical noncompliance who was admitted with acute encephalopathy.  Patient had just been admitted and discharged the day before with acute decompensation of heart failure due to medical noncompliance.  He was diuresed and restarted on his home cardiac medications.  After discharge, patient was found in the hospital cafeteria with confusion and dyspnea.  He was transferred to the ED and subsequently admitted.  Cardiology was consulted and repeat echo showed no acute changes.  They recommended medical management, including resuming outpatient cardiac medications.  Subsequent CVA workup for his altered mentation was unremarkable.  MRI brain unremarkable.  EEG within normal range; no seizure activity seen.      Patient’s confusion improved during admission.  Psychiatry was consulted for management of anxiety, which was likely a contributing factor to patient’s acute dyspnea and encephalopathy.  He was found to have an unspecified anxiety disorder and extreme mood dysregulation.  Psychiatry felt inpatient psychiatric hospitalization would be beneficial to patient and plan was to place consult to  for admission.  Patient did not meet criteria for legal hold as he had capacity to make medical decisions.    During admission patient exhibited episodes of increased agitation and displeasure regarding his management and ultimately chose to leave the hospital against medical advice       Therefore, he is discharged in guarded and stable condition against medcial advice.    Discharge Date  7/31/2018    DISCHARGE DIAGNOSES  Principal Problem:    Encephalopathy POA: Yes  Active  Problems:    Chronic systolic (congestive) heart failure (HCC) POA: Yes    Obesity (BMI 30.0-34.9) POA: Yes    Hyponatremia POA: Yes    Polysubstance abuse POA: Yes    Generalized anxiety disorder POA: Yes    Sinus tachycardia POA: Yes  Resolved Problems:    * No resolved hospital problems. *      FOLLOW UP  Future Appointments  Date Time Provider Department Center   8/28/2018 2:10 PM Destini Lee M.D. [facility]   9/13/2018 2:00 PM JESSE Bah [facility] None     Pcp Pt States None  [id number]            MEDICATIONS ON DISCHARGE     Medication List      ASK your doctor about these medications      Instructions   ALPRAZolam 0.5 MG Tabs  Commonly known as:  XANAX  Ask about: Should I take this medication?   Take 1 Tab by mouth 3 times a day as needed for Anxiety for up to 7 days.  Dose:  0.5 mg          CONTINUE taking these medications.  ASA 81mg daily  Carvedilol 12.5mg BID  Lisinopril 5mg daily  Spironolactone 25mg daily      Allergies  No Known Allergies    DIET  No orders of the defined types were placed in this encounter.      ACTIVITY  As tolerated.  Weight bearing as tolerated    CONSULTATIONS  Cardiology  Psychiatry    PROCEDURES  MR-BRAIN-W/O   Final Result         1.  Study significantly degraded by patient motion artifact.      2.  Otherwise unremarkable MRI scan of the brain without contrast.      ECHOCARDIOGRAM LTD W/ CONT   Final Result      CT-CTA NECK WITH & W/O-POST PROCESSING   Final Result      1.  CT angiogram of the neck demonstrating no evidence of hemodynamically significant carotid artery stenosis or occlusion.   2.  There are redundant and looped proximal internal carotid arteries bilaterally but they are patent.   3.  Bilateral groundglass opacities in the lung apices could be due to changes of pulmonary edema or pneumonia.      CT-CTA HEAD WITH & W/O-POST PROCESS   Final Result      1.  CT angiogram of the [place] within normal limits.      CT-CEREBRAL PERFUSION ANALYSIS    Final Result      1.  CT perfusion examination over the limited section of brain reveals 0 mL of brain parenchyma has less than 30% of cerebral blood flow (CBF).      2.  Please note that the cerebral perfusion was performed on the limited brain tissue around the basal ganglia region. Infarct/ischemia outside the CT perfusion sections can be missed in this study.      CT-HEAD W/O   Final Result      1.  Head CT without contrast within normal limits. No evidence of acute cerebral infarction, hemorrhage or mass lesion.      DX-CHEST-PORTABLE (1 VIEW)   Final Result      1.  There are continued changes of an enlarged cardiac silhouette with associated pulmonary edema.        Echo-  Compare to prior echo from 2016 - no significant change.  Left ventricular ejection fraction is visually estimated to be 20%.  Severe mitral and tricuspid regurgitation due to annular dilation.  Biatrial enlargement.  Estimated right ventricular systolic pressure of 50 mmHg is likely   underestimated due to severe tricuspid regurgitation.  Thrombus is not observed in the left ventricular apex.    LABORATORY  Lab Results   Component Value Date    SODIUM 138 08/05/2018    POTASSIUM 4.1 08/05/2018    CHLORIDE 107 08/05/2018    CO2 19 (L) 08/05/2018    GLUCOSE 108 (H) 08/05/2018    BUN 25 (H) 08/05/2018    CREATININE 0.86 08/05/2018    CREATININE 0.9 02/17/2008        Lab Results   Component Value Date    WBC 8.6 08/05/2018    HEMOGLOBIN 12.9 (L) 08/05/2018    HEMATOCRIT 40.6 (L) 08/05/2018    PLATELETCT 318 08/05/2018        Total time of the discharge process exceeds 38 minutes.

## 2018-08-08 NOTE — DISCHARGE PLANNING
Agency/Facility Name: [facility]  Correspondence  Outcome: Patient declined due to drug use.

## 2018-08-08 NOTE — PROGRESS NOTES
"[facility] Hospitalist Progress Note    Date of Service: 8/7/2018    Chief Complaint  38 y.o. male admitted 8/5/2018 with shortness of breath. Patient initially was admitted because of worsening congestive heart failure. The heart failure is acute and biventricular [place] class IV, and patient is found to have a EF of 20%.  The patient wanted a heart transplant, however due to the fact that the patient abuses methamphetamines he cannot get a heart transplant at this point in time, and on admission requesting to be placed Comfort Care measures only.    Interval Problem Update  -Upon initial assessment patient extremely sedated on medication, falling asleep while talking, and unable to hold a discussion without falling asleep or getting agitated.  Patient complains of shortness of breath and pain all over, which he was unable to specify stating \"I just want my pain medication and that is all I want\"   -Multiple discussions with patient about comfort care measures and about the cardiac medications would help him optimize his cardiac status, however patient continues to be inconsistent and indecisive with decision.  Due to this patient was placed back on cardiac medications until able to stay awake long enough to have a full discussion and make proper decision for self.  Patient will be admitted under observation for further placement needs.    Consultants/Specialty  Hospice    Disposition  Pending placement -difficult disposition, not hospice candidate.    Review of Systems   Constitutional: Positive for malaise/fatigue. Negative for chills, diaphoresis, fever and weight loss.   HENT: Negative.    Eyes: Negative.  Negative for blurred vision and double vision.   Respiratory: Positive for shortness of breath. Negative for cough, hemoptysis, sputum production and wheezing.    Cardiovascular: Positive for orthopnea. Negative for chest pain, palpitations, claudication, leg swelling and PND.   Gastrointestinal: Negative for " abdominal pain, blood in stool, constipation, diarrhea, heartburn, melena, nausea and vomiting.   Genitourinary: Negative for dysuria, flank pain, frequency, hematuria and urgency.   Musculoskeletal: Positive for myalgias. Negative for back pain, falls, joint pain and neck pain.   Skin: Negative.  Negative for itching and rash.   Neurological: Positive for weakness. Negative for dizziness, tingling, tremors, sensory change, speech change, focal weakness, seizures, loss of consciousness and headaches.   Endo/Heme/Allergies: Negative for environmental allergies and polydipsia. Does not bruise/bleed easily.   Psychiatric/Behavioral: Positive for depression and substance abuse. Negative for hallucinations, memory loss and suicidal ideas. The patient is nervous/anxious and has insomnia.    All other systems reviewed and are negative.     Physical Exam  Laboratory/Imaging   Hemodynamics  Temp (24hrs), Av.5 °C (97.7 °F), Min:36.1 °C (96.9 °F), Max:36.8 °C (98.3 °F)   Temperature: 36.4 °C (97.5 °F)  Pulse  Av.6  Min: 80  Max: 111   Blood Pressure: 114/76      Respiratory      Respiration: 20, Pulse Oximetry: 93 %        RUL Breath Sounds: Diminished, RML Breath Sounds: Diminished, RLL Breath Sounds: Diminished, RENE Breath Sounds: Diminished, LLL Breath Sounds: Diminished    Fluids    Intake/Output Summary (Last 24 hours) at 18  Last data filed at 18 0600   Gross per 24 hour   Intake                0 ml   Output              600 ml   Net             -600 ml       Nutrition  Orders Placed This Encounter   Procedures   • Diet Order Cardiac, 2 Gram Sodium     Standing Status:   Standing     Number of Occurrences:   1     Order Specific Question:   Diet:     Answer:   Cardiac [6]     Order Specific Question:   Diet:     Answer:   2 Gram Sodium [7]     Physical Exam   Constitutional: He is oriented to person, place, and time. He appears well-developed and well-nourished.   HENT:   Head: Normocephalic  and atraumatic.   Mouth/Throat: Oropharynx is clear and moist.   Eyes: Pupils are equal, round, and reactive to light. Conjunctivae and EOM are normal.   Neck: Normal range of motion. Neck supple. JVD present. No thyromegaly present.   Cardiovascular: Normal rate and regular rhythm.    Murmur heard.  Pulmonary/Chest: He has no wheezes. He has no rales. He exhibits no tenderness.   Abdominal: Soft. He exhibits no distension and no mass. There is no tenderness. There is no rebound and no guarding.   Musculoskeletal: Normal range of motion. He exhibits edema. He exhibits no tenderness.   Lymphadenopathy:     He has no cervical adenopathy.   Neurological: He is alert and oriented to person, place, and time. No cranial nerve deficit.   Skin: Skin is warm and dry. No rash noted. No erythema.   Multiple tattoos    Psychiatric: His mood appears anxious. His affect is labile. His speech is delayed and slurred. He is slowed. Cognition and memory are normal. He expresses impulsivity and inappropriate judgment. He is inattentive.   Nursing note and vitals reviewed.      Recent Labs      08/05/18   2350   WBC  8.6   RBC  4.55*   HEMOGLOBIN  12.9*   HEMATOCRIT  40.6*   MCV  89.2   MCH  28.4   MCHC  31.8*   RDW  45.7   PLATELETCT  318   MPV  9.7     Recent Labs      08/05/18   2350   SODIUM  138   POTASSIUM  4.1   CHLORIDE  107   CO2  19*   GLUCOSE  108*   BUN  25*   CREATININE  0.86   CALCIUM  8.9         Recent Labs      08/05/18   2350  08/06/18   0849   BNPBTYPENAT  714*  598*              Assessment/Plan     Chronic combined systolic and diastolic congestive heart failure (HCC)- (present on admission)   Assessment & Plan    Patient has an ejection fraction of 20%, initially requesting comfort care measures only however due to patient being indecisive and inconsistent behaviors, along with patient not being a hospice candidate  will continue medications in order to help optimize heart function as best as possible while  placement is being obtained in order for him to be comfortable.    -At this time we will maintain this patient on aspirin 81mg p.o. daily, carvedilol 3.125 p.o. twice daily, Lasix 20mg p.o. daily, lisinopril 5mg p.o. daily and Aldactone 25mg po daily  -Oxygen as needed for comfort        Comfort measures only status   Assessment & Plan    Patient is a complex disposition.  Hospice team has been involved.  Patient evaluated, discussed with patient given difficulty of hospice placement, not a hospice candidate per report from hospice team to be discharged home.  At this time patient started acting goofy, inappropriate.  Similar to what happened upon my recent admission H&P and evaluation for this patient.  Overall pattern highly consistent with malingering, in order to refuse discharge.  Extremely sedated on medication this morning, no acute need for narcotics.  Not imminently dying.  Drug-seeking behavior, narcotic seeking and benzodiazepine seeking behavior.  I had an extensive discussion with this patient, discussed his wishes.  Discussed if he wants to continue ongoing comfort measures/ongoing management or what his preferences are.  Later in the day another extensive discussion in front of his wife and daughter.  Patient reports is not ready to go home.  Has nowhere to go home.  Has poor social support.  Reports he will take his medication and wants ongoing pain medications for comfort.  He reports that he wants to maintain comfort measures CODE STATUS and wants to continue therapy for his cardiac conditions with oral medications only.    -Will admit for observation with placement pending        Elevated BUN   Assessment & Plan    At this point we are not continuing with monitoring of his BUN.        Shortness of breath   Assessment & Plan    Shortness of breath at this point is secondary to his end-stage combined heart failure. The patient at this point will be continued on pain management and anxiety  management.        Lactic acidosis   Assessment & Plan    We are no longer going to monitor at this point his lactic acid levels he is at this point elected to go on comfort care.        Generalized anxiety disorder- (present on admission)   Assessment & Plan    Patient with history of anxiety, however not currently on medication at home and due to previously explained circumstances will attempt nonpharmacological measures and reassess for need if anxiety persists         Polysubstance abuse- (present on admission)   Assessment & Plan    Patient's history of methamphetamine is what caused him to have an ejection fraction of only 20%.        Obesity (BMI 30.0-34.9)- (present on admission)   Assessment & Plan    At this point BMI currently is 30.8          Quality-Core Measures   Reviewed items::  EKG reviewed, Labs reviewed, Medications reviewed and Radiology images reviewed  Dai catheter::  No Dai  DVT prophylaxis pharmacological::  Not indicated at this time, ambulatory  DVT prophylaxis - mechanical:  Not indicated at this time, ambulatory  Ulcer Prophylaxis::  Not indicated  Assessed for rehabilitation services:  Patient returned to prior level of function, rehabilitation not indicated at this time          MYRANDA Finnegan.

## 2018-08-08 NOTE — DISCHARGE PLANNING
Telephoned VANCE from [facility] who confirmed Dianna at [facility] did not accept pt. Met with pt again to discuss other choices, provided choice form, pt signed referral form and chose [facility]. Again asked pt if he had any plans for discharge as it was difficult to find accepting  SNF that would accept him. Pt repeated that he was unable to return to his parents home.     This writer spoke with Supervisor Cheri to discuss other resources. Cheri advised she will call back when she has other recommendations

## 2018-08-08 NOTE — PROGRESS NOTES
Overnight summary: Pt able to sleep, only wakes up with SOB and with Nausea. Vomited once this am after giving oral meds.

## 2018-08-08 NOTE — PROGRESS NOTES
Assumed care of Pt at 0700 after report from CLEMENTE Calderon, assessment complete.  Pt resting comfortably, A & O X 4, VSS; Pt denies pain, discomfort at this time - requesting ginger ale; Pt o2 sats in mid 80s with 3L o2 n/c - reminded Pt to breath through nose and sats immediately increased to 99%.  Pt denies other needs; wants to sleep.  Bed in low position, call light within reach - will continue to monitor.

## 2018-08-08 NOTE — DISCHARGE PLANNING
Received Choice form at 0840  Agency/Facility Name: [facility] local SNF  Referral sent per Choice form @ 0453

## 2018-08-08 NOTE — PROGRESS NOTES
Did health teaching about his plan of care. Kept asking for Morphine and did talk with MD no morphine dose. Oral pain mediation ordered.

## 2018-08-08 NOTE — DISCHARGE PLANNING
Pt visited by Dianna from [facility] who is now considering reviewing pt for [facility] which is a facility Dianna also covers. Telephoned VANCE and informed her of Dianna's visit, VANCE confirmed that she will follow up with Dianna and inform this writer.

## 2018-08-08 NOTE — PROGRESS NOTES
Seen by Hospice. Patient plan for [facility] transfer. Family member at bedside and discuss about plan of care.

## 2018-08-09 NOTE — CARE PLAN
Problem: Pain Management  Goal: Pain level will decrease to patient's comfort goal  Outcome: PROGRESSING AS EXPECTED  Administered medication per order.     Problem: Psychosocial Needs:  Goal: Level of anxiety will decrease  Outcome: PROGRESSING AS EXPECTED  Educated pt on relaxation technique and deep breathing. Pt reported anxiety decreasing after relaxation.

## 2018-08-09 NOTE — PROGRESS NOTES
Discussed shortness of breath with patient. The pt states that he feels anxious, then has difficulty breathing and it is a vicious cycle. He also stated that he has no energy to do anything because he becomes so short of breath when he tries. Stated he can only take 10-15 steps before he is out of breath. I placed the pulse ox on while I was talking to the patient, he started at 96% on room air, he spoke to me for about a minute and became very short of breath, at this point his oxygen saturation dropped to 77%. I asked him to stop talking and take a deep breath and relax, his saturation came up to 98% in about 10 seconds. Discussed potential for a medication to help with anxiety with MD SARA stated he would look into it.

## 2018-08-09 NOTE — PROGRESS NOTES
[facility] Hospitalist Progress Note    Date of Service: 2018    Chief Complaint  38 y.o. male admitted 2018 with chf 2nd to  Drug abuse    Interval Problem Update  On comfort care    Consultants/Specialty  hospice    Disposition  pending        Review of Systems   Unable to perform ROS: Acuity of condition      Physical Exam  Laboratory/Imaging   Hemodynamics  Temp (24hrs), Av.2 °C (97.2 °F), Min:36.1 °C (96.9 °F), Max:36.8 °C (98.2 °F)   Temperature: 36.8 °C (98.2 °F)  Pulse  Av.2  Min: 78  Max: 111   Blood Pressure: (!) 92/58 (RN N otified)      Respiratory      Respiration: (!) 22, Pulse Oximetry: 93 %        RUL Breath Sounds: Diminished, RML Breath Sounds: Diminished, RLL Breath Sounds: Diminished, RENE Breath Sounds: Diminished, LLL Breath Sounds: Diminished    Fluids    Intake/Output Summary (Last 24 hours) at 18 1031  Last data filed at 18 0950   Gross per 24 hour   Intake              480 ml   Output                0 ml   Net              480 ml       Nutrition  Orders Placed This Encounter   Procedures   • Diet Order Cardiac, 2 Gram Sodium     Standing Status:   Standing     Number of Occurrences:   1     Order Specific Question:   Diet:     Answer:   Cardiac [6]     Order Specific Question:   Diet:     Answer:   2 Gram Sodium [7]     Physical Exam   Constitutional: He appears well-developed and well-nourished.   HENT:   Head: Normocephalic and atraumatic.   Eyes: Pupils are equal, round, and reactive to light. Conjunctivae are normal.   Neck: Normal range of motion. Neck supple.   Cardiovascular: Normal rate and regular rhythm.    Pulmonary/Chest: Effort normal and breath sounds normal.   Abdominal: Soft. Bowel sounds are normal.   Musculoskeletal: He exhibits no edema or tenderness.   Neurological: He is alert.   Skin: Skin is warm and dry.                                Assessment/Plan     Chronic combined systolic and diastolic congestive heart failure (HCC)- (present on  admission)   Assessment & Plan    No current issues  - patient has an ejection fraction of 20%, initially requesting comfort care measures only however due to patient being indecisive and inconsistent behaviors, along with patient not being a hospice candidate  will continue medications in order to help optimize heart function as best as possible while placement is being obtained in order for him to be comfortable.    -At this time we will maintain this patient on aspirin 81mg p.o. daily, carvedilol 3.125 p.o. twice daily, Lasix 20mg p.o. daily, lisinopril 5mg p.o. daily and Aldactone 25mg po daily  -Oxygen as needed for comfort        Nausea   Assessment & Plan    Today patient with continued nausea and intermittent vomiting after eating.  Patient with good bowel sounds no tenderness on palpation to abdomen with last BM on 8/7/2018 which was liquid in consistency.    -KUB ordered to rule out obstruction due to chronic narcotic use  -Antiemetics as needed  -Reglan added as needed before meals        Comfort measures only status   Assessment & Plan    Patient is a complex disposition.  Hospice team has been involved.  Patient evaluated, discussed with patient given difficulty of hospice placement, not a hospice candidate per report from hospice team to be discharged home.  At this time patient started acting goofy, inappropriate.  Similar to what happened upon my recent admission H&P and evaluation for this patient.  Overall pattern highly consistent with malingering, in order to refuse discharge.          Not imminently dying.  Drug-seeking behavior, narcotic seeking and benzodiazepine seeking behavior.  Extensive discussion with this patient by previous provider, discussed his wishes.  Discussed if he wants to continue ongoing comfort measures/ongoing management or what his preferences are.  Later in the day another extensive discussion in front of his wife and daughter.  Patient reports is not ready to go home.   Has nowhere to go home.  Has poor social support.  Reports he will take his medication and wants ongoing pain medications for comfort.  He reports that he wants to maintain comfort measures CODE STATUS and wants to continue therapy for his cardiac conditions with oral medications only.  Will continue with current measurements            Elevated BUN   Assessment & Plan    At this point we are not continuing with monitoring of his BUN.        Shortness of breath   Assessment & Plan    Shortness of breath at this point is secondary to his end-stage combined heart failure. The patient at this point will be continued on pain management and anxiety management.        Lactic acidosis   Assessment & Plan    We are no longer going to monitor at this point his lactic acid levels he is at this point elected to go on comfort care.        Generalized anxiety disorder- (present on admission)   Assessment & Plan    Controlled with current pain medication regimen - patient with history of anxiety, however not currently on medication at home and due to previously explained circumstances will attempt nonpharmacological measures and reassess for need if anxiety persists         Polysubstance abuse- (present on admission)   Assessment & Plan    Patient's history of methamphetamine is what caused him to have an ejection fraction of only 20%.        Obesity (BMI 30.0-34.9)- (present on admission)   Assessment & Plan    At this point BMI currently is 30.8          Quality-Core Measures

## 2018-08-09 NOTE — PROGRESS NOTES
Report given to Karina Wong. All questions answered at this time. Will bring patient to room once cleaned.

## 2018-08-09 NOTE — PROGRESS NOTES
Pt arrived to unit. Pt oriented to room. Pt assessed and 2 RN skin check completed. Pt's skin is intact. Nothing worth noting. Will continue to monitor. Call light in reach.

## 2018-08-09 NOTE — CARE PLAN
Problem: Safety  Goal: Will remain free from injury  Outcome: PROGRESSING AS EXPECTED  Will utilize bed alarms if necesarry, non skid socks, remind patient to call for assistance if needed      Problem: Pain Management  Goal: Pain level will decrease to patient's comfort goal  Outcome: PROGRESSING AS EXPECTED  Pt will experience relief of pain from various interventions or from ordered pain medication regimen

## 2018-08-10 NOTE — DISCHARGE PLANNING
Anticipated Discharge Disposition: Hospice if the patient has somewhere to go to get the hospice.    Action: This RN CM spoke with VANCE Jacobo from [facility] and she is not having any luck finding a SNF that can take him, even if we are able to do a Letter of Agreement. The concern for the SNF's is that the patient will attempt to use drugs at the SNF.  Until recently he was living with his mother but she kicked him out and still won't let him come back, even on hospice.    Barriers to Discharge: The patient has nowhere to live or to stay.    Plan: Check with Dianna at [facility] again to see if she will take the patient.

## 2018-08-10 NOTE — CARE PLAN
Problem: Communication  Goal: The ability to communicate needs accurately and effectively will improve    Intervention: Eliot patient and significant other/support system to call light to alert staff of needs  Patient oriented to safety measures and precautions in place.

## 2018-08-10 NOTE — PROGRESS NOTES
Pt AOx4, medicated for generalized pain. encouraged deep breathing technique to help with increased anxiety and put pt on 2L oxygen. Call light within reach, personal belongings available, bed in lowest position, treaded socks on, and hourly rounding in place.

## 2018-08-10 NOTE — HEART FAILURE PROGRAM
.Cardiovascular Nurse Navigator () Advanced Heart Failure Program Inpatient Progress Note:      As of 8/10, pt remains admitted on comfort care. No [facility] Plan note since 8/8. If patient discharges to SNF and/or with hospice, he will not require a seven calendar day HF f/u appointment.     Patient currently has the following appointment scheduled with the anticipation of SNF discharge:    Sep 13, 2018  2:00 PM PDT  Heart Failure New Patient with JESSE Bah  [facility] (--) [address], [address]  [place] [address]  [phone]       If patient is discharged to home without hospice, strongly advise completion of POLST.    As a reminder, the HF Care Bundle/Core Measures consist of:      1. A seven calendar day HF f/u appointment on the AVS, unless patient goes to SNF, inpatient rehab, or d/c with hospice.  2. HF discharge instructions discussed with patient and on the AVS and HF patient education packet provided and discussed  3. Appropriate medications for EF <40%: ACE/ARB or reason from MD why not prescribing in a note.  4. Documentation of left ventricular systolic assessment (echo or angio) or reason from MD why this is not done or will be done outpatient in a note.    Thank you and please call with questions, Concetta

## 2018-08-10 NOTE — CARE PLAN
Problem: Safety  Goal: Will remain free from falls  Safety precautions in place. Bed in low position, treaded socks on, personal possessions in reach, call light in reach and pt using it to call for assistance.     Problem: Psychosocial Needs:  Goal: Level of anxiety will decrease  Pt experiencing increased anxiety. Placed pt on 2L O2 and encouraged to take slow, deep breaths.

## 2018-08-10 NOTE — CARE PLAN
Problem: Psychosocial Needs:  Goal: Level of anxiety will decrease  Discussed ways to minimize anxiety with patient.

## 2018-08-10 NOTE — PROGRESS NOTES
[facility] Hospitalist Progress Note    Date of Service: 8/10/2018    Chief Complaint  38 y.o. male admitted 2018 with chf 2nd to  Drug abuse    Interval Problem Update  On comfort care    Consultants/Specialty  hospice    Disposition  pending        Review of Systems   Unable to perform ROS: Acuity of condition   Constitutional: Negative for chills, fever and weight loss.   HENT: Negative for ear pain, hearing loss and tinnitus.    Eyes: Negative for blurred vision, double vision and photophobia.   Respiratory: Negative for cough, hemoptysis and sputum production.    Cardiovascular: Negative for chest pain, palpitations and claudication.   Gastrointestinal: Negative for heartburn, nausea and vomiting.   Genitourinary: Negative for dysuria, frequency and urgency.   Musculoskeletal: Negative for myalgias and neck pain.   Skin: Negative for itching and rash.   Psychiatric/Behavioral: The patient is nervous/anxious.       Physical Exam  Laboratory/Imaging   Hemodynamics  Temp (24hrs), Av.2 °C (97.2 °F), Min:36.1 °C (97 °F), Max:36.4 °C (97.5 °F)   Temperature: 36.2 °C (97.1 °F)  Pulse  Av.6  Min: 78  Max: 111   Blood Pressure: 107/78      Respiratory      Respiration: (!) 22, Pulse Oximetry: 97 %        RUL Breath Sounds: Diminished, RML Breath Sounds: Diminished, RLL Breath Sounds: Diminished, RENE Breath Sounds: Diminished, LLL Breath Sounds: Diminished    Fluids    Intake/Output Summary (Last 24 hours) at 08/10/18 1023  Last data filed at 18 1351   Gross per 24 hour   Intake              480 ml   Output                0 ml   Net              480 ml       Nutrition  Orders Placed This Encounter   Procedures   • Diet Order Cardiac, 2 Gram Sodium     Standing Status:   Standing     Number of Occurrences:   1     Order Specific Question:   Diet:     Answer:   Cardiac [6]     Order Specific Question:   Diet:     Answer:   2 Gram Sodium [7]     Physical Exam   Constitutional: No distress.   HENT:   Right  Ear: External ear normal.   Left Ear: External ear normal.   Eyes: Right eye exhibits no discharge. Left eye exhibits no discharge.   Neck: No JVD present.   Cardiovascular: Normal heart sounds.    Pulmonary/Chest: No stridor. No respiratory distress. He has no wheezes. He has no rales.   Abdominal: He exhibits no distension. There is no tenderness. There is no rebound.   Musculoskeletal: He exhibits no edema or tenderness.   Neurological: He is alert.   Skin: Skin is warm and dry. He is not diaphoretic.                                Assessment/Plan     Chronic combined systolic and diastolic congestive heart failure (HCC)- (present on admission)   Assessment & Plan    Ef 20%  Acute on chronic  Systolic and diastolic dysfunction  2nd to drug abuse  Coreg  Lisinopril  Lasix  aldactone  -Oxygen as needed for comfort        Nausea   Assessment & Plan    reglan  kub result is noted and no acute pathology        Comfort measures only status   Assessment & Plan    Patient is a complex disposition.  Hospice team has been involved.  Patient evaluated, discussed with patient given difficulty of hospice placement, not a hospice candidate per report from hospice team to be discharged home.  At this time patient started acting goofy, inappropriate.  Similar to what happened upon my recent admission H&P and evaluation for this patient.  Overall pattern highly consistent with malingering, in order to refuse discharge.          Not imminently dying.  Drug-seeking behavior, narcotic seeking and benzodiazepine seeking behavior.  Extensive discussion with this patient by previous provider, discussed his wishes.  Discussed if he wants to continue ongoing comfort measures/ongoing management or what his preferences are.  Later in the day another extensive discussion in front of his wife and daughter.  Patient reports is not ready to go home.  Has nowhere to go home.  Has poor social support.  Reports he will take his medication and  wants ongoing pain medications for comfort.  He reports that he wants to maintain comfort measures CODE STATUS and wants to continue therapy for his cardiac conditions with oral medications only.  Will continue with current measurements  Lorazepam prn  D/w the staff during the rounds            Elevated BUN   Assessment & Plan    At this point we are not continuing with monitoring of his BUN.        Shortness of breath   Assessment & Plan    Shortness of breath at this point is secondary to his end-stage combined heart failure. The patient at this point will be continued on pain management and anxiety management.        Lactic acidosis   Assessment & Plan    We are no longer going to monitor at this point his lactic acid levels he is at this point elected to go on comfort care.        Generalized anxiety disorder- (present on admission)   Assessment & Plan    Controlled with current pain medication regimen - patient with history of anxiety, however not currently on medication at home and due to previously explained circumstances will attempt nonpharmacological measures and reassess for need if anxiety persists         Polysubstance abuse- (present on admission)   Assessment & Plan    Patient's history of methamphetamine is what caused him to have an ejection fraction of only 20%.        Obesity (BMI 30.0-34.9)- (present on admission)   Assessment & Plan    At this point BMI currently is 30.8          Quality-Core Measures   Dai catheter::  No Dai

## 2018-08-11 NOTE — PROGRESS NOTES
Patient alert and oriented x4, c/o anxiety, nausea, and SOB. RN gave PRN 0.5 mg Ativan and 25 mg Phenergan, RN also encouraged pt to use 2 LPM of oxygen supplementation. Patient complied. Pt also complained that he couldn't eat his dinner, that food caused him to feel nauseous and very dry in his mouth. He is requesting that his diet include Ensure/Boost in case he has a difficult time eating. This RN will speak with day RN to mention to hospitalist. Pt was able to eat 1 Jell-O without adverse effects. No distress noted at this time.

## 2018-08-11 NOTE — PROGRESS NOTES
[facility]    Date of Service: 2018    Chief Complaint  38 y.o. male admitted 2018 with chf 2nd to  Drug abuse    Interval Problem Update  On comfort care    Consultants/Specialty  hospice    Disposition  pending        Review of Systems   Constitutional: Positive for malaise/fatigue. Negative for weight loss.   HENT: Negative for ear discharge, ear pain and nosebleeds.    Eyes: Negative for photophobia, pain and discharge.   Respiratory: Positive for shortness of breath. Negative for sputum production and wheezing.    Cardiovascular: Negative for chest pain, palpitations and claudication.   Gastrointestinal: Negative for heartburn, nausea and vomiting.   Genitourinary: Negative for flank pain, frequency and hematuria.   Musculoskeletal: Negative for back pain, falls and joint pain.   Skin: Negative for itching and rash.   Neurological: Positive for weakness. Negative for tingling, tremors and headaches.   Psychiatric/Behavioral: The patient is nervous/anxious.       Physical Exam  Laboratory/Imaging   Hemodynamics  Temp (24hrs), Av.2 °C (97.2 °F), Min:35.9 °C (96.6 °F), Max:36.6 °C (97.8 °F)   Temperature: 36.5 °C (97.7 °F)  Pulse  Av.8  Min: 78  Max: 111   Blood Pressure: (!) 98/67 (rn aware)      Respiratory      Respiration: 20, Pulse Oximetry: 99 %        RUL Breath Sounds: Diminished, RML Breath Sounds: Diminished, RLL Breath Sounds: Diminished, RENE Breath Sounds: Diminished, LLL Breath Sounds: Diminished    Fluids    Intake/Output Summary (Last 24 hours) at 18 0942  Last data filed at 08/10/18 1854   Gross per 24 hour   Intake              238 ml   Output                0 ml   Net              238 ml       Nutrition  Orders Placed This Encounter   Procedures   • Diet Order Cardiac, 2 Gram Sodium     Standing Status:   Standing     Number of Occurrences:   1     Order Specific Question:   Diet:     Answer:   Cardiac [6]     Order Specific Question:   Diet:      Answer:   2 Gram Sodium [7]     Physical Exam   Constitutional: He appears well-developed and well-nourished.   HENT:   Head: Normocephalic and atraumatic.   Eyes: Pupils are equal, round, and reactive to light. Conjunctivae are normal.   Neck: Normal range of motion. Neck supple.   Cardiovascular: Normal rate and regular rhythm.    Pulmonary/Chest: Effort normal and breath sounds normal.   Abdominal: Soft. Bowel sounds are normal.   Musculoskeletal: He exhibits no edema or tenderness.   Neurological: He is alert.   Skin: Skin is warm and dry.                                Assessment/Plan     Chronic combined systolic and diastolic congestive heart failure (HCC)- (present on admission)   Assessment & Plan    Ef 20%  Acute on chronic  Systolic and diastolic dysfunction  2nd to drug abuse  Coreg  Lisinopril  Lasix  aldactone  -Oxygen as needed for comfort        Nausea   Assessment & Plan    reglan  kub result is noted and no acute pathology        Comfort measures only status   Assessment & Plan    Patient is a complex disposition.  Hospice team has been involved.  Patient evaluated, discussed with patient given difficulty of hospice placement, not a hospice candidate per report from hospice team to be discharged home.  At this time patient started acting goofy, inappropriate.  Similar to what happened upon my recent admission H&P and evaluation for this patient.  Overall pattern highly consistent with malingering, in order to refuse discharge.          Not imminently dying.  Drug-seeking behavior, narcotic seeking and benzodiazepine seeking behavior.  Extensive discussion with this patient by previous provider, discussed his wishes.  Discussed if he wants to continue ongoing comfort measures/ongoing management or what his preferences are.  Later in the day another extensive discussion in front of his wife and daughter.  Patient reports is not ready to go home.  Has nowhere to go home.  Has poor social support.   Reports he will take his medication and wants ongoing pain medications for comfort.  He reports that he wants to maintain comfort measures CODE STATUS and wants to continue therapy for his cardiac conditions with oral medications only.  Will continue with current measurements  Lorazepam prn and is decreased in dose  Pt wants to have disbility process started for hime  Will D/w the staff during the rounds            Elevated BUN   Assessment & Plan    At this point we are not continuing with monitoring of his BUN.        Shortness of breath   Assessment & Plan    Shortness of breath at this point is secondary to his end-stage combined heart failure. The patient at this point will be continued on pain management and anxiety management.        Lactic acidosis   Assessment & Plan    We are no longer going to monitor at this point his lactic acid levels he is at this point elected to go on comfort care.        Generalized anxiety disorder- (present on admission)   Assessment & Plan    Controlled with current pain medication regimen - patient with history of anxiety, however not currently on medication at home and due to previously explained circumstances will attempt nonpharmacological measures and reassess for need if anxiety persists         Polysubstance abuse- (present on admission)   Assessment & Plan    Patient's history of methamphetamine is what caused him to have an ejection fraction of only 20%.        Obesity (BMI 30.0-34.9)- (present on admission)   Assessment & Plan    At this point BMI currently is 30.8          Quality-Core Measures   Dai catheter::  No Dai

## 2018-08-11 NOTE — CARE PLAN
Problem: Communication  Goal: The ability to communicate needs accurately and effectively will improve  Outcome: PROGRESSING AS EXPECTED  Pt uses call bell appropriately and is able to communicate needs effectively to staff.    Problem: Infection  Goal: Will remain free from infection  Outcome: PROGRESSING AS EXPECTED  Patient will show no signs of infection during shift. Proper hand hygiene is implemented.

## 2018-08-11 NOTE — PROGRESS NOTES
Assumed care for pt @0700; alert & oriented. Pt appears very drowsy and lethargic. C/O weakness, no energy to chew or eat. Boost supplement ordered. Safety precautions reviewed & patient encouraged to call for assistance before getting up. Patient refusing bed alarm at this time. Call light within reach, bed in lowest position, treaded socks on, and hourly rounding in place.

## 2018-08-12 NOTE — PROGRESS NOTES
"Assumed care of pt. A&Ox4. Currently resting in bed. Complains of difficulty sleeping, states \"every time I lay down I start coughing and can't get any sleep.\" When asked if there has been anything to assist with sleep in the past, pt states Seroquel has worked before. Will follow up with MD. Currently pending hospice placement per  notes. Fall precautions in place and call light within reach. Hourly rounding in place. All needs met at this time.    "

## 2018-08-12 NOTE — CARE PLAN
Problem: Safety  Goal: Will remain free from injury  Outcome: PROGRESSING AS EXPECTED  Safety precautions I place

## 2018-08-12 NOTE — PROGRESS NOTES
[facility] Progress Note    Date of Service: 2018    Chief Complaint  38 y.o. male admitted 2018 with chf 2nd to  Drug abuse    Interval Problem Update  On comfort care    Consultants/Specialty  hospice    Disposition  pending        Review of Systems   Constitutional: Positive for malaise/fatigue. Negative for chills and fever.   HENT: Negative for ear pain, hearing loss and tinnitus.    Eyes: Negative for blurred vision, double vision and photophobia.   Respiratory: Positive for cough and shortness of breath. Negative for sputum production.    Cardiovascular: Positive for orthopnea. Negative for chest pain and claudication.   Gastrointestinal: Negative for abdominal pain, diarrhea and vomiting.   Genitourinary: Negative for dysuria and frequency.   Musculoskeletal: Negative for back pain, myalgias and neck pain.   Skin: Negative for itching and rash.   Neurological: Positive for weakness. Negative for tremors, sensory change and speech change.   Psychiatric/Behavioral: The patient is nervous/anxious.       Physical Exam  Laboratory/Imaging   Hemodynamics  Temp (24hrs), Av.4 °C (97.6 °F), Min:36 °C (96.8 °F), Max:36.7 °C (98.1 °F)   Temperature: 36.7 °C (98.1 °F)  Pulse  Av.9  Min: 78  Max: 111   Blood Pressure: 111/67      Respiratory      Respiration: 18, Pulse Oximetry: 94 %        RUL Breath Sounds: (P) Diminished, RML Breath Sounds: (P) Diminished, RLL Breath Sounds: (P) Diminished, RENE Breath Sounds: (P) Diminished, LLL Breath Sounds: (P) Diminished    Fluids    Intake/Output Summary (Last 24 hours) at 18 0914  Last data filed at 18 1905   Gross per 24 hour   Intake              700 ml   Output                0 ml   Net              700 ml       Nutrition  Orders Placed This Encounter   Procedures   • Diet Order Cardiac, 2 Gram Sodium     Standing Status:   Standing     Number of Occurrences:   1     Order Specific Question:   Diet:     Answer:   Cardiac [6]     Order  Specific Question:   Diet:     Answer:   2 Gram Sodium [7]     Physical Exam   Constitutional: No distress.   HENT:   Right Ear: External ear normal.   Left Ear: External ear normal.   Eyes: Right eye exhibits no discharge. Left eye exhibits no discharge.   Neck: No JVD present.   Cardiovascular: Normal heart sounds.    Pulmonary/Chest: No stridor. No respiratory distress. He has no wheezes. He has no rales.   Abdominal: He exhibits no distension. There is no tenderness. There is no rebound.   Musculoskeletal: He exhibits no edema or tenderness.   Neurological: He is alert.   Skin: Skin is warm and dry. He is not diaphoretic.                                Assessment/Plan     Chronic combined systolic and diastolic congestive heart failure (HCC)- (present on admission)   Assessment & Plan    Ef 20%  Acute on chronic  Systolic and diastolic dysfunction  2nd to drug abuse  Coreg  Lisinopril  Lasix  aldactone  -Oxygen as needed for comfort        Nausea   Assessment & Plan    reglan  kub result is noted and no acute pathology        Comfort measures only status   Assessment & Plan    Patient is a complex disposition.  Hospice team has been involved.  Patient evaluated, discussed with patient given difficulty of hospice placement, not a hospice candidate per report from hospice team to be discharged home.  At this time patient started acting goofy, inappropriate.  Similar to what happened upon my recent admission H&P and evaluation for this patient.  Overall pattern highly consistent with malingering, in order to refuse discharge.          Not imminently dying.  Drug-seeking behavior, narcotic seeking and benzodiazepine seeking behavior.  Extensive discussion with this patient by previous provider, discussed his wishes.  Discussed if he wants to continue ongoing comfort measures/ongoing management or what his preferences are.  Later in the day another extensive discussion in front of his wife and daughter.  Patient  reports is not ready to go home.  Has nowhere to go home.  Has poor social support.  Reports he will take his medication and wants ongoing pain medications for comfort.  He reports that he wants to maintain comfort measures CODE STATUS and wants to continue therapy for his cardiac conditions with oral medications only.  Will continue with current measurements  Lorazepam prn and is decreased in dose  Pt wants to have disbility process started for him  Discussed with the staff during the rounds   will speak to the pt about disability process            Elevated BUN   Assessment & Plan    At this point we are not continuing with monitoring of his BUN.        Shortness of breath   Assessment & Plan    Shortness of breath at this point is secondary to his end-stage combined heart failure. The patient at this point will be continued on pain management and anxiety management.        Lactic acidosis   Assessment & Plan    We are no longer going to monitor at this point his lactic acid levels he is at this point elected to go on comfort care.        Generalized anxiety disorder- (present on admission)   Assessment & Plan    Controlled with current pain medication regimen - patient with history of anxiety, however not currently on medication at home and due to previously explained circumstances will attempt nonpharmacological measures and reassess for need if anxiety persists         Polysubstance abuse- (present on admission)   Assessment & Plan    Patient's history of methamphetamine is what caused him to have an ejection fraction of only 20%.        Obesity (BMI 30.0-34.9)- (present on admission)   Assessment & Plan    At this point BMI currently is 30.8          Quality-Core Measures   Dai catheter::  No Dai

## 2018-08-12 NOTE — PROGRESS NOTES
Patient is alert and oriented x4, up self and was pushing himself around unit in wheelchair. He made two complete laps before returning to room. RN reminded pt not overexert himself and that when he felt out of breath to use his oxygen. Pt uses 2 LPM of supplemental oxygen for comfort care via oxymask. Pt c/o anxiety once back in his room, 0.25 mg Ativan PRN given. Call light within reach, hourly rounding done.

## 2018-08-12 NOTE — CARE PLAN
Problem: Respiratory:  Goal: Respiratory status will improve  Outcome: PROGRESSING SLOWER THAN EXPECTED    Intervention: Administer and titrate oxygen therapy  Pt c/o SOB when ambulating. Pt using 2 LPM of supplemental oxygen PRN for comfort care. Pt educated to HOB for easier breathing.      Problem: Knowledge Deficit  Goal: Knowledge of disease process/condition, treatment plan, diagnostic tests, and medications will improve  Outcome: PROGRESSING AS EXPECTED    Intervention: Explain information regarding disease process/condition, treatment plan, diagnostic tests, and medications and document in education  Discussed treatment plan with pt, he verbalized understanding.

## 2018-08-12 NOTE — CARE PLAN
Problem: Mobility  Goal: Risk for activity intolerance will decrease  Outcome: PROGRESSING SLOWER THAN EXPECTED  Patient is comfort care so monitoring is not as stringent. He is reminded to rest when tired and use oxygen when SOB.

## 2018-08-12 NOTE — PROGRESS NOTES
Patient unable to sleep, up in wheelchair pushing himself around the unit. RN reminded pt to not overexert himself.

## 2018-08-13 NOTE — PROGRESS NOTES
Patient A&Ox4, VS stable, transfers independently to chair, mobilized around unit x2.  Patient fatigued after mobilizing.  Patient slept 4 hours during day. No IV. EG 20%.  Continue with Comfort Care.

## 2018-08-13 NOTE — PROGRESS NOTES
Bedside report received.  Pt is on comfort care and is complaining of pain in the throat due to coughing. Pt requests Seroquel to help pt sleep. Pt was educated that medication is not available per MAR at this time. Pt was offered cough drops and other PRN medication options for insomnia if needed. POC discussed with pt; all questions answered at this time.

## 2018-08-13 NOTE — CARE PLAN
Problem: Psychosocial Needs:  Goal: Level of anxiety will decrease  Outcome: PROGRESSING AS EXPECTED  Pt was medicated per MAR with 0.25 mg Ativan PO for anxiety. Anxiety level has decreased and pt is able to rest comfortably.

## 2018-08-13 NOTE — CARE PLAN
Problem: Respiratory:  Goal: Respiratory status will improve  Outcome: PROGRESSING AS EXPECTED  Pt is on 2 LO 2 nasal cannula and SPO2 is at 96% pt is frequently reminded to put nasal canula back in place and needs frequent reminders  especially when pt has shortness of breath during activity.

## 2018-08-13 NOTE — PROGRESS NOTES
Dr. Adhikari was paged regarding pt request for Cepacol medication for coughing. Dr. Adhikari ordered Cepacol 1 lozenge PO PRN for coughing. Order was read back verbally via telephone to verify correctness.

## 2018-08-13 NOTE — CARE PLAN
Problem: Safety  Goal: Will remain free from injury  Outcome: PROGRESSING AS EXPECTED  Patient will remain safe evaluate dizziness before transfer to .

## 2018-08-13 NOTE — PROGRESS NOTES
"Pt started acting inappropriate during several encounters while communicating with pt in pt's room. Pt is on comfort care, was medicated per MAR appropriately for PRN pain medication and ativan PRN for anxiety per MAR. Pt continues to request medication for pain in the throat and chest from coughing and pt cannot fall asleep. Pt stated \"I cannot sleep can't you give me something to knock me out\" Pt asked \"Why can't you guys just put me out of my misery and give me medication so I can fall asleep.\" Pt was educated regarding the physician's medication orders and the time frequency that the medication times have to be administered.     Pt responded by saying \"Well I won't be alive for much longer anyway with this heart, I probably won't be around for much longer than a few weeks, why can't I just be comfortable at least?\" Pt was asked several assessment questions to follow up on pain level and anxiety level after administration of PRN medications. Pt was unable to clearly communicate or reflect if the pain level has increased or decreased. Pt also was unable to communicate needs other than medication administration that could potentially help to make pt comfortable. Reassurrance, therapeutic communication and repositioning were interventions used to make pt more comfortable. Pt was asked how pt used to cope with this in the past. Pt stated \" I usually cope with this by buying a ton of alcohol and drinking until I pass out and am comfortable enough to sleep.\"    Pt could benefit from  consult or psychological evaluation since pt verbalizes hopelessness.  "

## 2018-08-13 NOTE — CARE PLAN
Problem: Knowledge Deficit  Goal: Knowledge of disease process/condition, treatment plan, diagnostic tests, and medications will improve  Outcome: PROGRESSING AS EXPECTED  Patient to be knowledgeable to all conditions, treatments, diagnostic test, and meds and how to continue with good self care.

## 2018-08-13 NOTE — PROGRESS NOTES
[facility] Hospitalist Progress Note    Date of Service: 2018    Chief Complaint  38 y.o. male admitted 2018 with chf 2nd to  Drug abuse    Interval Problem Update  On comfort care    Consultants/Specialty  hospice    Disposition  pending        Review of Systems   Constitutional: Negative for chills, diaphoresis and fever.   HENT: Negative for congestion, ear discharge and nosebleeds.    Eyes: Negative for photophobia, pain and discharge.   Respiratory: Positive for shortness of breath. Negative for hemoptysis and sputum production.    Cardiovascular: Positive for orthopnea. Negative for chest pain and claudication.   Gastrointestinal: Negative for heartburn, nausea and vomiting.   Genitourinary: Negative for dysuria, frequency and urgency.   Musculoskeletal: Negative for back pain, falls and joint pain.   Skin: Negative for itching and rash.   Neurological: Positive for weakness. Negative for dizziness, tingling and headaches.   Psychiatric/Behavioral: The patient is nervous/anxious.       Physical Exam  Laboratory/Imaging   Hemodynamics  Temp (24hrs), Av.2 °C (97.1 °F), Min:36.1 °C (96.9 °F), Max:36.2 °C (97.2 °F)   Temperature: 36.1 °C (96.9 °F)  Pulse  Av  Min: 75  Max: 111   Blood Pressure: 102/79      Respiratory      Respiration: 18, Pulse Oximetry: 97 %        RUL Breath Sounds: Diminished, RML Breath Sounds: Diminished, RLL Breath Sounds: Diminished, RENE Breath Sounds: Diminished, LLL Breath Sounds: Diminished    Fluids    Intake/Output Summary (Last 24 hours) at 18 1047  Last data filed at 18 0000   Gross per 24 hour   Intake             1260 ml   Output                0 ml   Net             1260 ml       Nutrition  Orders Placed This Encounter   Procedures   • Diet Order Cardiac, 2 Gram Sodium     Standing Status:   Standing     Number of Occurrences:   1     Order Specific Question:   Diet:     Answer:   Cardiac [6]     Order Specific Question:   Diet:     Answer:   2 Gram  Sodium [7]     Physical Exam   Constitutional: No distress.   HENT:   Head: Normocephalic and atraumatic.   Eyes: Pupils are equal, round, and reactive to light. Conjunctivae are normal.   Neck: Normal range of motion. Neck supple.   Cardiovascular: Normal rate and regular rhythm.    Pulmonary/Chest: Effort normal and breath sounds normal.   Abdominal: Soft. Bowel sounds are normal.   Musculoskeletal: He exhibits no edema or tenderness.   Neurological: He is alert.   Skin: Skin is warm and dry. He is not diaphoretic.                                Assessment/Plan     Chronic combined systolic and diastolic congestive heart failure (HCC)- (present on admission)   Assessment & Plan    Ef 20%  Acute on chronic  Systolic and diastolic dysfunction  2nd to drug abuse  Coreg  Lisinopril  Lasix  aldactone  -Oxygen as needed for comfort  No acute complaint this morning        Nausea   Assessment & Plan    reglan  kub result is noted and no acute pathology        Comfort measures only status   Assessment & Plan    Patient is a complex disposition.  Hospice team has been involved.  Patient evaluated, discussed with patient given difficulty of hospice placement, not a hospice candidate per report from hospice team to be discharged home.  At this time patient started acting goofy, inappropriate.  Similar to what happened upon my recent admission H&P and evaluation for this patient.  Overall pattern highly consistent with malingering, in order to refuse discharge.          Not imminently dying.  Drug-seeking behavior, narcotic seeking and benzodiazepine seeking behavior.  Extensive discussion with this patient by previous provider, discussed his wishes.  Discussed if he wants to continue ongoing comfort measures/ongoing management or what his preferences are.  Later in the day another extensive discussion in front of his wife and daughter.  Patient reports is not ready to go home.  Has nowhere to go home.  Has poor social  support.  Reports he will take his medication and wants ongoing pain medications for comfort.  He reports that he wants to maintain comfort measures CODE STATUS and wants to continue therapy for his cardiac conditions with oral medications only.  Will continue with current measurements  Lorazepam prn and is decreased in dose  Pt wants to have disbility process started for him  Discussed with the staff during the rounds  Pt is difficult discharge since his comfort care            Elevated BUN   Assessment & Plan    At this point we are not continuing with monitoring of his BUN.        Shortness of breath   Assessment & Plan    Shortness of breath at this point is secondary to his end-stage combined heart failure. The patient at this point will be continued on pain management and anxiety management.        Lactic acidosis   Assessment & Plan    We are no longer going to monitor at this point his lactic acid levels he is at this point elected to go on comfort care.        Generalized anxiety disorder- (present on admission)   Assessment & Plan    Controlled with current pain medication regimen - patient with history of anxiety, however not currently on medication at home and due to previously explained circumstances will attempt nonpharmacological measures and reassess for need if anxiety persists         Polysubstance abuse- (present on admission)   Assessment & Plan    Patient's history of methamphetamine is what caused him to have an ejection fraction of only 20%.        Obesity (BMI 30.0-34.9)- (present on admission)   Assessment & Plan    At this point BMI currently is 30.8          Quality-Core Measures   Dai catheter::  No Dai

## 2018-08-14 NOTE — PROGRESS NOTES
[facility] Medicine Daily Progress Note    Date of Service  8/14/2018    Chief Complaint  38 y.o. male admitted 8/5/2018 with shortness of breath.  This patient has known history of cardiomyopathy likely due to polysubstance abuse.  He has ongoing polysubstance abuse.  He was recently admitted to the hospital about a week ago for similar symptoms.  Which improved after IV diuresis.  He was discharged home with regular heart failure medications.  However the patient has not been able to fill any of his medications and now re-presents with shortness of breath is worse with exertion and lying flat.  He also has history of severe anxiety and when he gets shortness of breath he develops anxiety attacks which worsens his shortness of breath.  He is also ran out of his benzodiazepines that he was discharged with.  He has no known alleviating or exacerbating factors otherwise.    Interval Problem Update  Patient sitting up in wheelchair eating breakfast. States he is slowly improing and notes that he was able to ambulate 10-15ft with a cane. Patient otherwise states that he doesn't know what the plan is for him and wants to talk to CM/SW    Consultants/Specialty  Hospice    Disposition  PENDING    Review of Systems  Review of Systems   Constitutional: Negative.    HENT: Negative.    Eyes: Negative.    Respiratory: Negative.    Cardiovascular: Negative.    Gastrointestinal: Negative.    Musculoskeletal: Negative.    Neurological: Negative.    All other systems reviewed and are negative.       Physical Exam  Blood Pressure: 107/68   Temperature: 37 °C (98.6 °F)   Pulse: 96   Respiration: 14   Pulse Oximetry: 98 %     Physical Exam   Constitutional: He is oriented to person, place, and time. No distress.   HENT:   Head: Normocephalic and atraumatic.   Eyes: Pupils are equal, round, and reactive to light.   Neck: Normal range of motion. Neck supple.   Cardiovascular: Normal rate, regular rhythm and normal heart sounds.     Pulmonary/Chest: Effort normal and breath sounds normal.   Abdominal: Soft. Bowel sounds are normal.   Musculoskeletal: Normal range of motion.   Neurological: He is alert and oriented to person, place, and time.   Skin: He is not diaphoretic.   Vitals reviewed.      Fluids    Intake/Output Summary (Last 24 hours) at 08/14/18 0842  Last data filed at 08/14/18 0200   Gross per 24 hour   Intake              705 ml   Output                0 ml   Net              705 ml       Laboratory                          Assessment/Plan  Chronic combined systolic and diastolic congestive heart failure (HCC)- (present on admission)   Assessment & Plan    Ef 20%  - Acute on chronic  - Systolic and diastolic dysfunction  - 2nd to drug abuse  - Continue Coreg, Lisinopril, Lasix, aldactone  -Oxygen as needed for comfort  No acute complaint this morning        Nausea   Assessment & Plan    - Continue Reglan  - KUB noted and no acute pathology        Comfort measures only status   Assessment & Plan    Patient is a complex disposition.  Hospice team has been involved.  Patient evaluated, discussed with patient given difficulty of hospice placement, not a hospice candidate per report from hospice team to be discharged home.  At this time patient started acting goofy, inappropriate.  Similar to what happened upon my recent admission H&P and evaluation for this patient.  Overall pattern highly consistent with malingering, in order to refuse discharge.        - NOT imminently dying.  Drug-seeking behavior, narcotic seeking and benzodiazepine seeking behavior.  Extensive discussion with this patient by previous provider, discussed his wishes.  Discussed if he wants to continue ongoing comfort measures/ongoing management or what his preferences are.  Later in the day another extensive discussion in front of his wife and daughter.  Patient reports is not ready to go home.  Has nowhere to go home.  Has poor social support.  Reports he will  take his medication and wants ongoing pain medications for comfort.  He reports that he wants to maintain comfort measures CODE STATUS and wants to continue therapy for his cardiac conditions with oral medications only.  - Will continue with current measurements  - Continue Lorazepam PRN at decreased dose  - Pt wants to have disbility process started for him  - Discussed with the staff during the rounds  - Pt is difficult discharge since his comfort care            Elevated BUN   Assessment & Plan    At this point we are not continuing with monitoring of his BUN.        Shortness of breath   Assessment & Plan    Shortness of breath at this point is secondary to his end-stage combined heart failure. The patient at this point will be continued on pain management and anxiety management.        Lactic acidosis   Assessment & Plan    We are no longer going to monitor at this point his lactic acid levels he is at this point elected to go on comfort care.        Generalized anxiety disorder- (present on admission)   Assessment & Plan    Controlled with current pain medication regimen - patient with history of anxiety, however not currently on medication at home and due to previously explained circumstances will attempt nonpharmacological measures and reassess for need if anxiety persists         Polysubstance abuse- (present on admission)   Assessment & Plan    Patient's history of methamphetamine is what caused him to have an ejection fraction of only 20%.        Obesity (BMI 30.0-34.9)- (present on admission)   Assessment & Plan    At this point BMI currently is 30.8

## 2018-08-14 NOTE — CARE PLAN
Problem: Safety  Goal: Will remain free from injury  Outcome: PROGRESSING AS EXPECTED  Declines the bed alarm, safety teaching completed

## 2018-08-14 NOTE — CARE PLAN
Problem: Infection  Goal: Will remain free from infection  Outcome: PROGRESSING AS EXPECTED  Hand washing education implemented before and after meals and after bathroom, pt understand    Problem: Psychosocial Needs:  Goal: Level of anxiety will decrease  Outcome: PROGRESSING AS EXPECTED  Distraction method and snacks implemented

## 2018-08-14 NOTE — DISCHARGE PLANNING
"Anticipated Discharge Disposition:     Action: Attempted to talk to patient after IDT rounds.  Patient was difficult to keep on subject.  Patient stated that he was on unemployment when he would out of the hospital, but since he has been in [facility] he is no longer receiving it. Wanted to know if I was here to help him get his disability as he needs it.  When I told patient that I can give him direction on this on how to apply, either in person or on line. Patient first stated he hasn't been able to open his eyes for two week, he was told by a Matilde she would help him do this. I told patient I don't know who this is.  Patient stated when is someone going to take accountability for not following through.  When I told that patient that I was only made aware of his Social Security Disability request.  Patient stated that I need to ask him direct questions, bullet point.  Because the way I am asking him questions, I will make up stories about him.  I asked patient directly if he is aware he is on comfort care.  Patient stated he didn't know he was till a couple days ago.  Stating, \"I had to look it up in the Internet.\" Patient then said that he heard two nursing whispering that he was on comfort care.  Due to the confusion/manipulation, I stated that I would schedule a meeting tomorrow with the patient's doctor, to make sure everyone is hearing the same thing.     Barriers to Discharge: placement        Plan: schedule a meeting with Dr. Juan/Social Work and patient    "

## 2018-08-15 PROBLEM — R11.0 NAUSEA: Status: RESOLVED | Noted: 2018-01-01 | Resolved: 2018-01-01

## 2018-08-15 NOTE — PROGRESS NOTES
Assumed care of pt at change of shift.  Pt resting calmly in room an denied any needs.  Just after 10pm, pt came out and had a dry, hacking cough.  I offered help, and he had me administer a cough drop and then stated that he was anxious and in pain, and the proper medication was administered.  Pt provided with a snack, and denied any further needs.

## 2018-08-15 NOTE — PROGRESS NOTES
[facility] Medicine Daily Progress Note    Date of Service  8/15/2018    Chief Complaint  38 y.o. male admitted 8/5/2018 with shortness of breath.  This patient has known history of cardiomyopathy likely due to polysubstance abuse.  He has ongoing polysubstance abuse.  He was recently admitted to the hospital about a week ago for similar symptoms.  Which improved after IV diuresis.  He was discharged home with regular heart failure medications.  However the patient has not been able to fill any of his medications and now re-presents with shortness of breath is worse with exertion and lying flat.  He also has history of severe anxiety and when he gets shortness of breath he develops anxiety attacks which worsens his shortness of breath.  He is also ran out of his benzodiazepines that he was discharged with.  He has no known alleviating or exacerbating factors otherwise.    Interval Problem Update  Patient sitting up in bed during clinical encounter. Significant time spent this AM explaining to the patient that he is currently on comfort measures and is being considered for Hospice. Patient now states that he understands and wants to know when he will be leaving to a Hospice facility.    Consultants/Specialty  None    Disposition  PENDING    Review of Systems  Review of Systems   Constitutional: Negative.    HENT: Negative.    Eyes: Negative.    Respiratory: Negative.    Cardiovascular: Negative.    Gastrointestinal: Negative.    Musculoskeletal: Negative.    Neurological: Negative.    All other systems reviewed and are negative.       Physical Exam  Blood Pressure: 106/67   Temperature: 36.2 °C (97.1 °F)   Pulse: 99   Respiration: 16   Pulse Oximetry: 96 %     Physical Exam   Constitutional: He is oriented to person, place, and time. No distress.   HENT:   Head: Normocephalic and atraumatic.   Eyes: Pupils are equal, round, and reactive to light.   Neck: Normal range of motion. Neck supple.   Cardiovascular: Normal  rate, regular rhythm and normal heart sounds.    Pulmonary/Chest: Effort normal and breath sounds normal.   Abdominal: Soft. Bowel sounds are normal.   Musculoskeletal: Normal range of motion.   Neurological: He is alert and oriented to person, place, and time.   Skin: He is not diaphoretic.   Vitals reviewed.      Fluids    Intake/Output Summary (Last 24 hours) at 08/15/18 0819  Last data filed at 08/15/18 0600   Gross per 24 hour   Intake              770 ml   Output                0 ml   Net              770 ml       Laboratory                          Assessment/Plan  Chronic combined systolic and diastolic congestive heart failure (HCC)- (present on admission)   Assessment & Plan    - LVEF 20%  - Acute on chronic  - Systolic and diastolic dysfunction  - 2nd to drug abuse  - Continue Coreg, Lisinopril, Lasix, aldactone  -Oxygen as needed for comfort  - Patient on Comfort Care which he now seems to understand        Comfort measures only status   Assessment & Plan    - Unchanged  - Remains a complex disposition.  Hospice team has been involved.  Patient evaluated, discussed with patient given difficulty of hospice placement, not a hospice candidate per report from hospice team to be discharged home.  At this time patient started acting goofy, inappropriate.  Similar to what happened upon my recent admission H&P and evaluation for this patient.  Overall pattern highly consistent with malingering, in order to refuse discharge.        - NOT imminently dying.  Drug-seeking behavior, narcotic seeking and benzodiazepine seeking behavior.  Extensive discussion with this patient by previous provider, discussed his wishes.  Discussed if he wants to continue ongoing comfort measures/ongoing management or what his preferences are.  Later in the day another extensive discussion in front of his wife and daughter.  Patient reports is not ready to go home.  Has nowhere to go home.  Has poor social support.  Reports he will take  his medication and wants ongoing pain medications for comfort.  He reports that he wants to maintain comfort measures CODE STATUS and wants to continue therapy for his cardiac conditions with oral medications only.  - Will continue with current measurements  - Continue Lorazepam PRN  - Pt wants to have disbility process started for him  - Discussed with the staff during the rounds  - Pt is difficult discharge since his comfort care            Elevated BUN   Assessment & Plan    At this point we are not continuing with monitoring of his BUN.        Shortness of breath   Assessment & Plan    Shortness of breath at this point is secondary to his end-stage combined heart failure. The patient at this point will be continued on pain management and anxiety management.        Lactic acidosis   Assessment & Plan    We are no longer going to monitor at this point his lactic acid levels he is at this point elected to go on comfort care.        Generalized anxiety disorder- (present on admission)   Assessment & Plan    Controlled with current pain medication regimen - patient with history of anxiety, however not currently on medication at home and due to previously explained circumstances will attempt nonpharmacological measures and reassess for need if anxiety persists         Polysubstance abuse- (present on admission)   Assessment & Plan    - Patient's history of methamphetamine is what caused him to have an ejection fraction of only 20%.  - Patient counseled regarding illicit drug use        Obesity (BMI 30.0-34.9)- (present on admission)   Assessment & Plan    - Stable  - At this point BMI currently is 30.8

## 2018-08-15 NOTE — CARE PLAN
Problem: Cardiac:  Goal: Cardiovascular alteration will improve    Intervention: Manage medications  VS checked, medication given per MAR      Problem: Nutritional:  Goal: Ability to attain and maintain optimal nutritional status will improve  Outcome: PROGRESSING AS EXPECTED  Cardiac with 2 gram sodium diet provided

## 2018-08-15 NOTE — CARE PLAN
Problem: Pain Management  Goal: Pain level will decrease to patient's comfort goal    Intervention: Educate and implement non-pharmacologic comfort measures. Examples: relaxation, distration, play therapy, activity therapy, massage, etc.  Medicated patient per mar, but also discussed non-pharm pain distraction techniques.      Problem: Infection  Goal: Will remain free from infection    Intervention: Implement standard precautions and perform hand washing before and after patient contact  Standard precautions in place

## 2018-08-16 NOTE — CARE PLAN
Problem: Psychosocial Needs:  Goal: Level of anxiety will decrease  Outcome: PROGRESSING AS EXPECTED  Pt denies feeling anxious this am. Pt is calm and watching tv in his room. Pt encouraged to voice feelings, pt verbalized understanding.     Problem: Venous Thromboembolism (VTW)/Deep Vein Thrombosis (DVT) Prevention:  Goal: Patient will participate in Venous Thrombosis (VTE)/Deep Vein Thrombosis (DVT)Prevention Measures  Outcome: PROGRESSING SLOWER THAN EXPECTED  No DVT prophylaxis due to pt's comfort care status. Pt transfers self from bed to wheelchair. Occasionally ambulates unit with single point walker. RN encouraged pt to minimize ambulation until LE venous duplex is complexed and results are in.

## 2018-08-16 NOTE — PROGRESS NOTES
Pt AOx4, medicated for abdominal/chest pain. Pt in wheelchair, anxious about his constant coughing. Distraction and food provided. Call light within reach, personal belongings available, bed in lowest position, treaded socks on, and hourly rounding in place.

## 2018-08-16 NOTE — PROGRESS NOTES
"Assumed care of pt this am. Pt is A&O x4. Pt denies pain this am. States the R. Leg pain resolved yesterday after an hour of onset, explained it as a warm \"different\" pain. LE venous duplex ordered, RN will call and request it be done today per MD request. Call lights is within reach. Bed is locked and in the lowest position. Hourly rounding in place.  "

## 2018-08-16 NOTE — PROGRESS NOTES
[facility] Medicine Daily Progress Note    Date of Service  8/16/2018    Chief Complaint  38 y.o. male admitted 8/5/2018 with shortness of breath.  This patient has known history of cardiomyopathy likely due to polysubstance abuse.  He has ongoing polysubstance abuse.  He was recently admitted to the hospital about a week ago for similar symptoms.  Which improved after IV diuresis.  He was discharged home with regular heart failure medications.  However the patient has not been able to fill any of his medications and now re-presents with shortness of breath is worse with exertion and lying flat.  He also has history of severe anxiety and when he gets shortness of breath he develops anxiety attacks which worsens his shortness of breath.  He is also ran out of his benzodiazepines that he was discharged with.  He has no known alleviating or exacerbating factors otherwise.    Interval Problem Update  Extensive discussion yesterday with patient, his friend (My), SW (Reilly) and myself regarding end goal. Patient ultimately decided that he wants to go to [place] so he can be close to his daughter. Candace volunteered to take him if her  (Sukhwinder) approves. Patient now OFF O2 supplementation.  Maicol PACHECO    Consultants/Specialty  None    Disposition  PENDING    Review of Systems  Review of Systems   Constitutional: Negative.    HENT: Negative.    Eyes: Negative.    Respiratory: Negative.    Cardiovascular: Negative.    Gastrointestinal: Negative.    Musculoskeletal: Negative.    Neurological: Negative.    All other systems reviewed and are negative.       Physical Exam  Blood Pressure: 110/65   Temperature: 36.6 °C (97.8 °F)   Pulse: (!) 104   Respiration: 18   Pulse Oximetry: 96 %     Physical Exam   Constitutional: He is oriented to person, place, and time. No distress.   HENT:   Head: Normocephalic and atraumatic.   Eyes: Pupils are equal, round, and reactive to light.   Neck: Normal range of motion. Neck  supple.   Cardiovascular: Normal rate, regular rhythm and normal heart sounds.    Pulmonary/Chest: Effort normal and breath sounds normal.   Abdominal: Soft. Bowel sounds are normal.   Musculoskeletal: Normal range of motion.   Neurological: He is alert and oriented to person, place, and time.   Skin: He is not diaphoretic.   Vitals reviewed.      Fluids    Intake/Output Summary (Last 24 hours) at 08/16/18 0659  Last data filed at 08/16/18 0600   Gross per 24 hour   Intake             1640 ml   Output                0 ml   Net             1640 ml       Laboratory  Recent Labs      08/15/18   1625   WBC  6.4   RBC  4.65*   HEMOGLOBIN  12.7*   HEMATOCRIT  41.1*   MCV  88.4   MCH  27.3   MCHC  30.9*   RDW  47.9   PLATELETCT  250   MPV  10.0     Recent Labs      08/15/18   1624   SODIUM  137   POTASSIUM  4.6   CHLORIDE  102   CO2  26   GLUCOSE  87   BUN  16   CREATININE  0.91   CALCIUM  9.0                     Assessment/Plan  Chronic combined systolic and diastolic congestive heart failure (HCC)- (present on admission)   Assessment & Plan    - LVEF 20%  - Acute on chronic  - Systolic and diastolic dysfunction  - 2nd to drug abuse  - Continue Coreg, Lisinopril, Lasix, aldactone  -Oxygen as needed for comfort  - Patient on Comfort Care which he now seems to understand  - Patient's friend (My) was with patient at bedside and volunteered to take patient with her to [place] if her  (Sukhwinder) approves        Comfort measures only status   Assessment & Plan    - Unchanged  - Remains a complex disposition.  Hospice team has been involved.  Patient evaluated, discussed with patient given difficulty of hospice placement, not a hospice candidate per report from hospice team to be discharged home.  At this time patient started acting goofy, inappropriate.  Similar to what happened upon my recent admission H&P and evaluation for this patient.  Overall pattern highly consistent with malingering, in order to refuse  discharge.        - NOT imminently dying.  Drug-seeking behavior, narcotic seeking and benzodiazepine seeking behavior.  Extensive discussion with this patient by previous provider, discussed his wishes.  Discussed if he wants to continue ongoing comfort measures/ongoing management or what his preferences are.  Later in the day another extensive discussion in front of his wife and daughter.  Patient reports is not ready to go home.  Has nowhere to go home.  Has poor social support.  Reports he will take his medication and wants ongoing pain medications for comfort.  He reports that he wants to maintain comfort measures CODE STATUS and wants to continue therapy for his cardiac conditions with oral medications only.  - Will continue with current measurements  - Continue Lorazepam PRN  - Pt wants to have disbility process started for him  - Discussed with the staff during the rounds  - Pt is difficult discharge since his comfort care  - Patient's friend (My) was with patient at bedside and volunteered to take patient with her to [place] if her  (Sukhwinder) approves        Elevated BUN   Assessment & Plan    At this point we are not continuing with monitoring of his BUN.        Shortness of breath   Assessment & Plan    Shortness of breath at this point is secondary to his end-stage combined heart failure. The patient at this point will be continued on pain management and anxiety management.        Lactic acidosis   Assessment & Plan    We are no longer going to monitor at this point his lactic acid levels he is at this point elected to go on comfort care.        Generalized anxiety disorder- (present on admission)   Assessment & Plan    Controlled with current pain medication regimen - patient with history of anxiety, however not currently on medication at home and due to previously explained circumstances will attempt nonpharmacological measures and reassess for need if anxiety persists         Polysubstance  abuse- (present on admission)   Assessment & Plan    - Patient's history of methamphetamine is what caused him to have an ejection fraction of only 20%.  - Patient counseled regarding illicit drug use        Obesity (BMI 30.0-34.9)- (present on admission)   Assessment & Plan    - Stable  - At this point BMI currently is 30.8

## 2018-08-16 NOTE — CARE PLAN
Problem: Pain Management  Goal: Pain level will decrease to patient's comfort goal  Pt medicated with tramadol for abdominal and chest pain due to coughing.    Problem: Discharge Barriers/Planning  Goal: Patient's continuum of care needs will be met  Pt awaiting placement. SW involved.

## 2018-08-17 NOTE — PROGRESS NOTES
Pt sitting up in wheelchair watching a baseball game on television. No episodes of anxiety at this time. Call light within reach, personal belongings available, bed in lowest position, treaded socks on, and hourly rounding in place.

## 2018-08-17 NOTE — CARE PLAN
Problem: Infection  Goal: Will remain free from infection  Outcome: PROGRESSING AS EXPECTED  Patient remains free of symptoms of infection.

## 2018-08-17 NOTE — PROGRESS NOTES
Patient in wheelchair able to maneuver on his own without difficulty. Reports generalized aching but no severe pain at this time. Denies sob at this time. At approximately noon patient requesting ativan for anxiety, while female family member at bed side. Patient has refused Seroquel due at 1100 d/t grogginess. States that he has not been sleeping well since he was admitted to hospital. States he only sleep 1-2 hours at a time. Believes it's d/t he is in an unfamiliar place.

## 2018-08-17 NOTE — PROGRESS NOTES
[facility] Medicine Daily Progress Note    Date of Service  8/17/2018    Chief Complaint  38 y.o. male admitted 8/5/2018 with shortness of breath.  This patient has known history of cardiomyopathy likely due to polysubstance abuse.  He has ongoing polysubstance abuse.  He was recently admitted to the hospital about a week ago for similar symptoms.  Which improved after IV diuresis.  He was discharged home with regular heart failure medications.  However the patient has not been able to fill any of his medications and now re-presents with shortness of breath is worse with exertion and lying flat.  He also has history of severe anxiety and when he gets shortness of breath he develops anxiety attacks which worsens his shortness of breath.  He is also ran out of his benzodiazepines that he was discharged with.  He has no known alleviating or exacerbating factors otherwise.    Interval Problem Update  Patient denies any complaints. Notes that he will be giving his friend (My) a call to see if she will take him. Remains OFF O2 supplementation. U/S LE's negative for DVT    Consultants/Specialty  None    Disposition  PENDING    Review of Systems  Review of Systems   Constitutional: Negative.    HENT: Negative.    Eyes: Negative.    Respiratory: Negative.    Cardiovascular: Negative.    Gastrointestinal: Negative.    Musculoskeletal: Negative.    Neurological: Negative.    All other systems reviewed and are negative.       Physical Exam  Blood Pressure: 106/65   Temperature:  (comfort care)   Pulse: (!) 103   Respiration: 18   Pulse Oximetry: 96 %     Physical Exam   Constitutional: He is oriented to person, place, and time. No distress.   HENT:   Head: Normocephalic and atraumatic.   Eyes: Pupils are equal, round, and reactive to light.   Neck: Normal range of motion. Neck supple.   Cardiovascular: Normal rate, regular rhythm and normal heart sounds.    Pulmonary/Chest: Effort normal and breath sounds normal.   Abdominal:  Soft. Bowel sounds are normal.   Musculoskeletal: Normal range of motion.   Neurological: He is alert and oriented to person, place, and time.   Skin: He is not diaphoretic.   Vitals reviewed.      Fluids    Intake/Output Summary (Last 24 hours) at 08/17/18 0506  Last data filed at 08/16/18 1800   Gross per 24 hour   Intake             1080 ml   Output                0 ml   Net             1080 ml       Laboratory  Recent Labs      08/15/18   1625   WBC  6.4   RBC  4.65*   HEMOGLOBIN  12.7*   HEMATOCRIT  41.1*   MCV  88.4   MCH  27.3   MCHC  30.9*   RDW  47.9   PLATELETCT  250   MPV  10.0     Recent Labs      08/15/18   1624   SODIUM  137   POTASSIUM  4.6   CHLORIDE  102   CO2  26   GLUCOSE  87   BUN  16   CREATININE  0.91   CALCIUM  9.0                     Assessment/Plan  Chronic combined systolic and diastolic congestive heart failure (HCC)- (present on admission)   Assessment & Plan    -Slowly improving  - LVEF 20%  - Acute on chronic  - Systolic and diastolic dysfunction  - 2nd to drug abuse  - Continue Coreg, Lisinopril, Lasix, aldactone  - Patient on Comfort Care which he now seems to understand  - Patient's friend (My) was with patient at bedside and volunteered to take patient with her to [place] if her  (Sukhwinder) approves        Comfort measures only status   Assessment & Plan    - Unchanged  - Remains a complex disposition.  Hospice team has been involved.  Patient evaluated, discussed with patient given difficulty of hospice placement, not a hospice candidate per report from hospice team to be discharged home.  At this time patient started acting goofy, inappropriate.  Similar to what happened upon my recent admission H&P and evaluation for this patient.  Overall pattern highly consistent with malingering, in order to refuse discharge.        - NOT imminently dying.  Drug-seeking behavior, narcotic seeking and benzodiazepine seeking behavior.  Extensive discussion with this patient by previous  provider, discussed his wishes.  Discussed if he wants to continue ongoing comfort measures/ongoing management or what his preferences are.  Later in the day another extensive discussion in front of his wife and daughter.  Patient reports is not ready to go home.  Has nowhere to go home.  Has poor social support.  Reports he will take his medication and wants ongoing pain medications for comfort.  He reports that he wants to maintain comfort measures CODE STATUS and wants to continue therapy for his cardiac conditions with oral medications only.  - Will continue with current measurements  - Continue Lorazepam PRN  - Pt wants to have disbility process started for him  - Discussed with the staff during the rounds  - Pt is difficult discharge since his comfort care  - Patient's friend (My) was with patient at bedside and volunteered to take patient with her to [place] if her  (Sukhwinder) approves        Elevated BUN   Assessment & Plan    At this point we are not continuing with monitoring of his BUN.        Shortness of breath   Assessment & Plan    Shortness of breath at this point is secondary to his end-stage combined heart failure. The patient at this point will be continued on pain management and anxiety management.        Lactic acidosis   Assessment & Plan    We are no longer going to monitor at this point his lactic acid levels he is at this point elected to go on comfort care.        Generalized anxiety disorder- (present on admission)   Assessment & Plan    Controlled with current pain medication regimen - patient with history of anxiety, however not currently on medication at home and due to previously explained circumstances will attempt nonpharmacological measures and reassess for need if anxiety persists         Polysubstance abuse- (present on admission)   Assessment & Plan    - Patient's history of methamphetamine is what caused him to have an ejection fraction of only 20%.  - Patient counseled  regarding illicit drug use  - Patient tends to get agitated when speaking about his drug abuse        Obesity (BMI 30.0-34.9)- (present on admission)   Assessment & Plan    - Stable  - At this point BMI currently is 30.8

## 2018-08-17 NOTE — CARE PLAN
Problem: Psychosocial Needs:  Goal: Level of anxiety will decrease  Provided distraction and snacks. No anxiety episodes noted at this time.    Problem: Venous Thromboembolism (VTW)/Deep Vein Thrombosis (DVT) Prevention:  Goal: Patient will participate in Venous Thrombosis (VTE)/Deep Vein Thrombosis (DVT)Prevention Measures  Pt receiving daily lovenox injections.

## 2018-08-17 NOTE — CARE PLAN
Problem: Safety  Goal: Will remain free from falls  Outcome: PROGRESSING AS EXPECTED  Patient remains free from falls.

## 2018-08-18 NOTE — CARE PLAN
Problem: Safety  Goal: Will remain free from injury  Outcome: PROGRESSING AS EXPECTED  Patient remains free from falls.

## 2018-08-18 NOTE — PROGRESS NOTES
Patient asleep this AM. This afternoon, patient is AAOX4 maneuvers wheelchair independently without difficulty.  Denies any pain/sob at this time.

## 2018-08-18 NOTE — CARE PLAN
Problem: Safety  Goal: Will remain free from falls    Intervention: Assess risk factors for falls  Pt assessed for risk factors for falls using crane kenton scale pt is considered no risk.      Problem: Infection  Goal: Will remain free from infection    Intervention: Implement standard precautions and perform hand washing before and after patient contact  Rn implemented standard precautions by using hand  before entering pt room and after leaving pt room.

## 2018-08-18 NOTE — CARE PLAN
Problem: Infection  Goal: Will remain free from infection  Outcome: PROGRESSING AS EXPECTED  Patient exhibits no s/s of infection

## 2018-08-18 NOTE — PROGRESS NOTES
Assumed care of pt at change of shift. Pt is A&Ox4 sitting up in bed and call light is within reach. States that his pain is 5/10 medication given.

## 2018-08-18 NOTE — PROGRESS NOTES
[facility] Medicine Daily Progress Note    Date of Service  8/18/2018    Chief Complaint  38 y.o. male admitted 8/5/2018 with shortness of breath.  This patient has known history of cardiomyopathy likely due to polysubstance abuse.  He has ongoing polysubstance abuse.  He was recently admitted to the hospital about a week ago for similar symptoms.  Which improved after IV diuresis.  He was discharged home with regular heart failure medications.  However the patient has not been able to fill any of his medications and now re-presents with shortness of breath is worse with exertion and lying flat.  He also has history of severe anxiety and when he gets shortness of breath he develops anxiety attacks which worsens his shortness of breath.  He is also ran out of his benzodiazepines that he was discharged with.  He has no known alleviating or exacerbating factors otherwise.    Interval Problem Update  Patient denies any complaints. Remains off O2 supplement. States he called his friend My yesterday and left a message. He will try again today.    Consultants/Specialty  None    Disposition  PENDING    Review of Systems  Review of Systems   Constitutional: Negative.    HENT: Negative.    Eyes: Negative.    Respiratory: Negative.    Cardiovascular: Negative.    Gastrointestinal: Negative.    Musculoskeletal: Negative.    Neurological: Negative.    All other systems reviewed and are negative.       Physical Exam  Blood Pressure: 107/75   Temperature: 36.6 °C (97.9 °F)   Pulse: 100   Respiration: 16   Pulse Oximetry: 98 %     Physical Exam   Constitutional: He is oriented to person, place, and time. No distress.   HENT:   Head: Normocephalic and atraumatic.   Eyes: Pupils are equal, round, and reactive to light.   Neck: Normal range of motion. Neck supple.   Cardiovascular: Normal rate and normal heart sounds.    Pulmonary/Chest: Effort normal and breath sounds normal.   Abdominal: Soft. Bowel sounds are normal.    Musculoskeletal: Normal range of motion.   Neurological: He is alert and oriented to person, place, and time.   Skin: He is not diaphoretic.   Vitals reviewed.      Fluids    Intake/Output Summary (Last 24 hours) at 08/18/18 0656  Last data filed at 08/17/18 1300   Gross per 24 hour   Intake              240 ml   Output                0 ml   Net              240 ml       Laboratory  Recent Labs      08/15/18   1625   WBC  6.4   RBC  4.65*   HEMOGLOBIN  12.7*   HEMATOCRIT  41.1*   MCV  88.4   MCH  27.3   MCHC  30.9*   RDW  47.9   PLATELETCT  250   MPV  10.0     Recent Labs      08/15/18   1624   SODIUM  137   POTASSIUM  4.6   CHLORIDE  102   CO2  26   GLUCOSE  87   BUN  16   CREATININE  0.91   CALCIUM  9.0                     Assessment/Plan  Chronic combined systolic and diastolic congestive heart failure (HCC)- (present on admission)   Assessment & Plan    -Slowly improving  - LVEF 20%  - Acute on chronic  - Systolic and diastolic dysfunction  - 2nd to drug abuse  - Continue Coreg, Lisinopril, Lasix, aldactone  - Patient on Comfort Care per patient's choice  - Patient's friend (My)  volunteered to take patient with her to [place] if her  (Sukhwinder) approves        Comfort measures only status   Assessment & Plan    - Unchanged  - Remains a complex disposition.  Hospice team has been involved.  Patient evaluated, discussed with patient given difficulty of hospice placement, not a hospice candidate per report from hospice team to be discharged home.  At this time patient started acting goofy, inappropriate.  Similar to what happened upon my recent admission H&P and evaluation for this patient.  Overall pattern highly consistent with malingering, in order to refuse discharge.        - NOT imminently dying.  Drug-seeking behavior, narcotic seeking and benzodiazepine seeking behavior.  Extensive discussion with this patient by previous provider, discussed his wishes.  Discussed if he wants to continue ongoing  comfort measures/ongoing management or what his preferences are.  Later in the day another extensive discussion in front of his wife and daughter.  Patient reports is not ready to go home.  Has nowhere to go home.  Has poor social support.  Reports he will take his medication and wants ongoing pain medications for comfort.  He reports that he wants to maintain comfort measures CODE STATUS and wants to continue therapy for his cardiac conditions with oral medications only.  - Will continue with current measurements  - Continue Lorazepam PRN  - Pt wants to have disbility process started for him  - Discussed with the staff during the rounds  - Pt is difficult discharge since his comfort care  - Patient's friend (My)  volunteered to take patient with her to [place] if her  (Sukhwinder) approves        Elevated BUN   Assessment & Plan    At this point we are not continuing with monitoring of his BUN.        Shortness of breath   Assessment & Plan    Shortness of breath at this point is secondary to his end-stage combined heart failure. The patient at this point will be continued on pain management and anxiety management.        Lactic acidosis   Assessment & Plan    We are no longer going to monitor at this point his lactic acid levels he is at this point elected to go on comfort care.        Generalized anxiety disorder- (present on admission)   Assessment & Plan    - Controlled with current pain medication regimen  - Has history of anxiety, however not currently on medication at home and due to previously explained circumstances will attempt nonpharmacological measures and reassess for need if anxiety persists         Polysubstance abuse- (present on admission)   Assessment & Plan    - Patient's history of methamphetamine is what caused him to have an ejection fraction of only 20%.  - Patient counseled regarding illicit drug use  - Patient tends to get agitated when speaking about his drug abuse and states his  cardiomyopathy is due to him drinking energy drinks        Obesity (BMI 30.0-34.9)- (present on admission)   Assessment & Plan    - Stable and unchanged  - At this point BMI currently is 30.8

## 2018-08-19 NOTE — PROGRESS NOTES
Patient asleep this AM. Waited until late morning to administer medications since patient has difficulty sleeping at night. AAOX4. Reports chest muscle aching d/t persistent cough. Administered Ultram as ordered for pain. Denies any other pain/sob at this time.

## 2018-08-19 NOTE — PROGRESS NOTES
Assumed care of Pt at change of shift. Pt is A&Ox4. Kelly pain. He reported anxiety and was given ativan. Pt does not have a bed alarm and is ambulation with a cane. Renforced use of call light.

## 2018-08-19 NOTE — CARE PLAN
Problem: Mobility  Goal: Risk for activity intolerance will decrease  Outcome: PROGRESSING AS EXPECTED  Patient now able to ambulate in halls with cane without difficulty.

## 2018-08-19 NOTE — CARE PLAN
Problem: Psychosocial Needs:  Goal: Level of anxiety will decrease    Intervention: Identify and develop with patient strategies to cope with anxiety triggers  Pt reported becoming very anxious. The RN disscussed coping strategies with the pt. Together they decided that the pt would use a low stimulus environment along with rest.       Problem: Venous Thromboembolism (VTW)/Deep Vein Thrombosis (DVT) Prevention:  Goal: Patient will participate in Venous Thrombosis (VTE)/Deep Vein Thrombosis (DVT)Prevention Measures    Intervention: Encourage ambulation/mobilization at level directed by Physical Therapy in collaboration with Interdisciplinary Team  Pt discussed his decision to ambulate more. The RN encourage ambulation to prevent VTE and DVT. The pt was cautioned to no over exert himself while ambulating.

## 2018-08-19 NOTE — PROGRESS NOTES
Patient is calling to request a letter for the [employer] stating he is not able to participate in the work requirements due to his left ankle fracture. Please include the length of time the patient will be unable to perform job functions. Patient would like this faxed to [phone] attn: TORREY Trujillo with the [employer]. Please contact patient at [phone] and let him know when this is done or with any questions.   [facility] Medicine Daily Progress Note    Date of Service  8/19/2018    Chief Complaint  38 y.o. male admitted 8/5/2018 with shortness of breath.  This patient has known history of cardiomyopathy likely due to polysubstance abuse.  He has ongoing polysubstance abuse.  He was recently admitted to the hospital about a week ago for similar symptoms.  Which improved after IV diuresis.  He was discharged home with regular heart failure medications.  However the patient has not been able to fill any of his medications and now re-presents with shortness of breath is worse with exertion and lying flat.  He also has history of severe anxiety and when he gets shortness of breath he develops anxiety attacks which worsens his shortness of breath.  He is also ran out of his benzodiazepines that he was discharged with.  He has no known alleviating or exacerbating factors otherwise.    Interval Problem Update  Patient appears sleepy this AM. Denies any complaints but not as cooperative during clinical encounter this AM. No acute epsidoes overnight per nursing staff    Consultants/Specialty  None    Disposition  PENDING    Review of Systems  Review of Systems   Constitutional: Negative.    HENT: Negative.    Eyes: Negative.    Respiratory: Negative.    Cardiovascular: Negative.    Gastrointestinal: Negative.    Musculoskeletal: Negative.    Neurological: Negative.    All other systems reviewed and are negative.       Physical Exam  Blood Pressure: 105/85   Temperature: 36.2 °C (97.1 °F)   Pulse: (!) 115   Respiration: 18   Pulse Oximetry: 96 %     Physical Exam   Constitutional: He is oriented to person, place, and time. No distress.   HENT:   Head: Normocephalic and atraumatic.   Eyes: Pupils are equal, round, and reactive to light.   Neck: Normal range of motion. Neck supple.   Cardiovascular: Normal rate, regular rhythm and normal heart sounds.    Pulmonary/Chest: Effort normal and breath sounds normal.   Abdominal: Soft. Bowel  sounds are normal.   Musculoskeletal: Normal range of motion.   Neurological: He is alert and oriented to person, place, and time.   Skin: He is not diaphoretic.   Vitals reviewed.      Fluids    Intake/Output Summary (Last 24 hours) at 08/19/18 0514  Last data filed at 08/18/18 1420   Gross per 24 hour   Intake              580 ml   Output                0 ml   Net              580 ml       Laboratory                          Assessment/Plan  Chronic combined systolic and diastolic congestive heart failure (HCC)- (present on admission)   Assessment & Plan    - Slowly improving  - LVEF 20%  - Acute on chronic  - Systolic and diastolic dysfunction  - 2nd to drug abuse  - Continue Coreg, Lisinopril, Lasix, aldactone  - Patient on Comfort Care per patient's choice  - Patient's friend (My)  volunteered to take patient with her to [place] if her  (Sukhwinder) approves        Comfort measures only status   Assessment & Plan    - Unchanged  - Remains a complex disposition.  Hospice team has been involved.  Patient evaluated, discussed with patient given difficulty of hospice placement, not a hospice candidate per report from hospice team to be discharged home.  At this time patient started acting goofy, inappropriate.  Similar to what happened upon my recent admission H&P and evaluation for this patient.  Overall pattern highly consistent with malingering, in order to refuse discharge.        - NOT imminently dying.  Drug-seeking behavior, narcotic seeking and benzodiazepine seeking behavior.  Extensive discussion with this patient by previous provider, discussed his wishes.  Discussed if he wants to continue ongoing comfort measures/ongoing management or what his preferences are.  Later in the day another extensive discussion in front of his wife and daughter.  Patient reports is not ready to go home.  Has nowhere to go home.  Has poor social support.  Reports he will take his medication and wants ongoing pain  medications for comfort.  He reports that he wants to maintain comfort measures CODE STATUS and wants to continue therapy for his cardiac conditions with oral medications only.  - Will continue with current measurements  - Continue Lorazepam PRN  - Pt wants to have disbility process started for him  - Discussed with the staff during the rounds  - Pt is difficult discharge to another facility since he is comfort care  - Patient's friend (My)  volunteered to take patient with her to [place] if her  (Sukhwinder) approves        Elevated BUN   Assessment & Plan    At this point we are not continuing with monitoring of his BUN.        Shortness of breath   Assessment & Plan    Shortness of breath at this point is secondary to his end-stage combined heart failure. The patient at this point will be continued on pain management and anxiety management.        Lactic acidosis   Assessment & Plan    We are no longer going to monitor at this point his lactic acid levels he is at this point elected to go on comfort care.        Generalized anxiety disorder- (present on admission)   Assessment & Plan    - Controlled with current pain medication regimen  - Has history of anxiety, however not currently on medication at home and due to previously explained circumstances will attempt nonpharmacological measures and reassess for need if anxiety persists         Polysubstance abuse- (present on admission)   Assessment & Plan    - Patient's history of methamphetamine is what caused him to have an ejection fraction of only 20%.  - Patient counseled regarding illicit drug use  - Patient tends to get agitated when speaking about his drug abuse and states his cardiomyopathy is due to him drinking energy drinks        Obesity (BMI 30.0-34.9)- (present on admission)   Assessment & Plan    - Stable and unchanged  - At this point BMI currently is 30.8

## 2018-08-19 NOTE — CARE PLAN
Problem: Bowel/Gastric:  Goal: Normal bowel function is maintained or improved  Outcome: PROGRESSING AS EXPECTED  Patient maintains normal bowel habits.

## 2018-08-20 NOTE — CARE PLAN
Problem: Psychosocial Needs:  Goal: Level of anxiety will decrease  Outcome: PROGRESSING SLOWER THAN EXPECTED  Pt requesting increase in anxiety medications    Problem: Discharge Barriers/Planning  Goal: Patient's continuum of care needs will be met    Intervention: Assess potential discharge barriers on admission and throughout hospital stay  With pt on DNR/CC  planning for DC to homeless shelter, d/t this POC pt wishes to pursue a full code status w/ all interventions for his CHF

## 2018-08-20 NOTE — CONSULTS
Cardiology Consult Note    Date of note:    8/20/2018      Patient ID:    Name:             Ryan Arteaga     YOB: 1980  Age:                 38 y.o.  male   MRN:               [id number]                                                             Consulting Physician: Gema Robledo MD    Consult question: Evaluation of systolic congestive heart failure    History of Present Illness:      Ryan Arteaga is a 38 y.o. 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  He has been managed on the hospitalist service now for a couple of weeks, and we are called to evaluate his heart failure exacerbation.  Early in his hospital course he had voiced that he wanted to be made comfort care, and was taken off of his medications.  Hospice evaluated him and did not feel that he was a hospice candidate recommending discharge home.  The patient then exhibited behavior consistent with malingering.  He was taken off of his IV pain medications.  At this point, he appears to have been made full code.    In speaking with the patient, he tells me that he can ambulate about 10 feet on level ground prior to having exertional dyspnea before he came in.  He also notes orthopnea, but no significant lower extremity edema.    He does not endorse any problems with getting his medications.  He does tell me though that he has lost his job, and his house.  I rather suspect that those are his sources of secondary gain issues.    Review of Systems:  All others reviewed and negative.    Past Medical History:   Past Medical History:   Diagnosis Date   • Congestive heart failure (HCC)    • Elevated BUN 8/6/2018   • Gastritis      Active Hospital Problems    Diagnosis   • Chronic combined systolic and diastolic congestive heart failure (HCC) [I50.42]     Priority: High   • Polysubstance abuse [F19.10]     Priority: Low   • Generalized  anxiety disorder [F41.1]     Priority: Low   • Obesity (BMI 30.0-34.9) [E66.9]     Priority: Low   • Comfort measures only status [Z51.5]       Past Surgical History:  No past surgical history on file.    Hospital Medications:    Current Facility-Administered Medications:   •  LORazepam (ATIVAN) tablet 0.5 mg, 0.5 mg, Oral, Q8HRS PRN, Oscar Juan M.D., 0.5 mg at 08/20/18 1222  •  digoxin (LANOXIN) injection 500 mcg, 500 mcg, Intravenous, Once, Cleve Navarro M.D.  •  [START ON 8/21/2018] furosemide (LASIX) injection 40 mg, 40 mg, Intravenous, Q DAY, Cleve Navarro M.D.  •  enoxaparin (LOVENOX) inj 40 mg, 40 mg, Subcutaneous, DAILY, Oscar Juan M.D., 40 mg at 08/20/18 0625  •  QUEtiapine (SEROQUEL) tablet 25 mg, 25 mg, Oral, Q8HR, Matteo Barber M.D., 25 mg at 08/20/18 1413  •  benzocaine-menthol (CEPACOL) lozenge 1 Lozenge, 1 Lozenge, Mouth/Throat, Q2HRS PRN, Kavin Adhikari M.D., 1 Lozenge at 08/15/18 1131  •  metoclopramide (REGLAN) tablet 5 mg, 5 mg, Oral, PC PRN + HS PRN, JESSE Finnegan, 5 mg at 08/08/18 1917  •  aspirin EC (ECOTRIN) tablet 81 mg, 81 mg, Oral, DAILY, Argelia Ordoñez M.D., 81 mg at 08/20/18 0626  •  lisinopril (PRINIVIL) 10 MG tablet 5 mg, 5 mg, Oral, Q DAY, Argelia Ordoñez M.D., Stopped at 08/20/18 0600  •  spironolactone (ALDACTONE) tablet 25 mg, 25 mg, Oral, Q DAY, Argelia Ordoñez M.D., 25 mg at 08/20/18 0626  •  tramadol (ULTRAM) 50 MG tablet 50 mg, 50 mg, Oral, Q4HRS PRN, Liseth Travis A.P.R.N., 50 mg at 08/19/18 1748  •  senna-docusate (PERICOLACE or SENOKOT S) 8.6-50 MG per tablet 2 Tab, 2 Tab, Oral, BID, 2 Tab at 08/20/18 0627 **AND** polyethylene glycol/lytes (MIRALAX) PACKET 1 Packet, 1 Packet, Oral, QDAY PRN **AND** magnesium hydroxide (MILK OF MAGNESIA) suspension 30 mL, 30 mL, Oral, QDAY PRN **AND** bisacodyl (DULCOLAX) suppository 10 mg, 10 mg, Rectal, QDAY PRN, Argelia Ordoñez M.D.  •  Respiratory Care per Protocol, , Nebulization, Continuous RT, Argelia Ordoñez,  M.D.  •  ondansetron (ZOFRAN) syringe/vial injection 4 mg, 4 mg, Intravenous, Q4HRS PRN, Argelia Ordoñez M.D.  •  ondansetron (ZOFRAN ODT) dispertab 4 mg, 4 mg, Oral, Q4HRS PRN, Argelia Ordoñez M.D., 4 mg at 08/10/18 1635  •  promethazine (PHENERGAN) tablet 12.5-25 mg, 12.5-25 mg, Oral, Q4HRS PRN, Argelia Ordoñez M.D., 25 mg at 08/10/18 1919  •  promethazine (PHENERGAN) suppository 12.5-25 mg, 12.5-25 mg, Rectal, Q4HRS PRN, Argelia Ordoñez M.D.  •  prochlorperazine (COMPAZINE) injection 5-10 mg, 5-10 mg, Intravenous, Q4HRS PRN, Argelia Ordoñez M.D., Stopped at 08/09/18 2205  •  acetaminophen (TYLENOL) tablet 650 mg, 650 mg, Oral, Q6HRS PRN, Argelia Ordoñez M.D., 650 mg at 08/07/18 2053    Current Outpatient Medications:  No prescriptions prior to admission.       Medication Allergy:  No Known Allergies    Family History:  Family History   Problem Relation Age of Onset   • Stroke Mother    • Heart Disease Father        Social History:  Social History     Social History   • Marital status: Single     Spouse name: N/A   • Number of children: N/A   • Years of education: N/A     Occupational History   • Not on file.     Social History Main Topics   • Smoking status: Light Tobacco Smoker     Packs/day: 0.07     Types: Cigarettes   • Smokeless tobacco: Never Used   • Alcohol use Yes      Comment: social   • Drug use: Yes      Comment: currently uses THC pen, hx of methamphetamine use   • Sexual activity: Not on file     Other Topics Concern   • Not on file     Social History Narrative   • No narrative on file         Physical Exam:  Vitals/   Weight/BMI: Body mass index is 29.09 kg/m².  Blood pressure (!) 89/61, pulse 79, temperature 36.8 °C (98.2 °F), resp. rate 20, height 1.829 m (6'), weight 97.3 kg (214 lb 8.1 oz), SpO2 99 %.  Vitals:    08/19/18 2000 08/19/18 2053 08/19/18 2144 08/20/18 0900   BP: (!) 98/61 (!) 93/62 (!) 97/70 (!) 89/61   Pulse: (!) 110 (!) 108 (!) 110 79   Resp:  19 (!) 26 20   Temp: 36 °C (96.8 °F) (!) 35.7 °C (96.2  °F) 36.6 °C (97.8 °F) 36.8 °C (98.2 °F)   SpO2: 100%  100% 99%   Weight:       Height:         Oxygen Therapy:  Pulse Oximetry: 99 %, O2 (LPM): 2.5, O2 Delivery: Silicone Nasal Cannula    Physical Exam   Constitutional: He is oriented to person, place, and time. He appears well-developed and well-nourished. No distress.   Pleasant, in no distress   HENT:   Head: Normocephalic and atraumatic.   Eyes: Pupils are equal, round, and reactive to light. Conjunctivae and EOM are normal.   Neck: No JVD present.   JVP measured at 7 cm H2O   Cardiovascular: Normal rate and regular rhythm.  Exam reveals no gallop and no friction rub.    Murmur heard.   Systolic (Blowing grade 3/6 systolic murmur appreciated at the left lower sternal border, and cardiac apex) murmur is present with a grade of 3/6   Pulmonary/Chest: Effort normal and breath sounds normal. No respiratory distress. He has no wheezes. He has no rales.   Abdominal: Soft. Bowel sounds are normal. He exhibits no distension.   Musculoskeletal: He exhibits no edema (no lower extremity edema bilaterally).   Neurological: He is alert and oriented to person, place, and time.   Skin: Skin is warm and dry. No rash noted. He is not diaphoretic. No erythema. No pallor.   Multiple tattoos noted on his arms (sleeve tattoos)   Psychiatric:   Depressed affect   Nursing note and vitals reviewed.      MDM (Data Review):     Records reviewed and summarized in current documentation    Lab Data Review:  No results found for this or any previous visit (from the past 24 hour(s)).    Imaging/Procedures Review (all reviewed and pertinent for):      Echocardiogram, 7/20/2018 demonstrated an left ventricular ejection fraction of 20%, and severe mitral and tricuspid regurgitation.  RVSP was moderately elevated at 50 mmHg at least in the setting of severe tricuspid regurgitation    I reviewed the tracings and report of his admission EKG showing sinus tachycardia, left ventricular hypertrophy,  nonspecific ST and T-wave changes.  He also had left atrial enlargement on that EKG    Impression and Recommendations:       1.  Systolic CHF: Minimally decompensated.  I have stopped his Coreg, and continue lisinopril and Aldactone converting Lasix to IV tomorrow, and adding digoxin.  I suspect that malingering is a significant contributor to his presentation.  However, as above I suspect that this is related to secondary gain issues related to him having lost his job and his living.  I do think that he would be reasonable for disability, and recommended that he apply for that.  He will be in touch with  regarding that.    2.  Chart history of methamphetamine abuse: None by recent urine toxicology, remote and minimal use per the patient.    Cleve Navarro MD  Cardiologist, [facility] [place]

## 2018-08-20 NOTE — PROGRESS NOTES
Pt A&O x4, no c/o pain, anxiety medication modified from 0.25mg to 0.5mg per pt request. Has been up walking and using WC throughout the day.    Around 1400 social worked and Dr. Fuentes updated this RN w/ new POC for pt. He is to be a full code, w/ all interventions for CHF to begin taking place. Cardiologist Dr. Cleve Navarro now consulting. Orders in for pt to transfer to telemetry. Pt updated w/ POC as well, pt receptive and compliant.

## 2018-08-20 NOTE — PROGRESS NOTES
"[facility] Medicine Daily Progress Note    Date of Service  8/20/2018    Chief Complaint  38 y.o. male admitted 8/5/2018 with shortness of breath.  This patient has known history of cardiomyopathy likely due to polysubstance abuse.  He has ongoing polysubstance abuse.  He was recently admitted to the hospital about a week ago for similar symptoms.  Which improved after IV diuresis.  He was discharged home with regular heart failure medications.  However the patient has not been able to fill any of his medications and now re-presents with shortness of breath is worse with exertion and lying flat.  He also has history of severe anxiety and when he gets shortness of breath he develops anxiety attacks which worsens his shortness of breath.  He is also ran out of his benzodiazepines that he was discharged with.  He has no known alleviating or exacerbating factors otherwise    Interval Problem Update  Patient states he is now \"highly anxious\" after finding out that he is on Comfort Care. I have explained to the patient as well as multiple documentations that patient has been Comfort Care since his admission, by his own choice. Patient states he didn't find out about this until last night and because of that, he is specifically requesting for his Ativan dose to be increased    UPDATE (8/20-13:56): Extensive talk with patient has been made with KEVIN Del Real regarding Comfort Care. Patient now states he DOES NOT WANT that and now wants all treatment. Patient was explained that if he wants to opt for medical treatment, that he will be made into a FULL CODE and that he will need Cardio consulted for further evaluation. Patient agrees. Code status is made BACK TO FULL CODE.    UPDATE (8/20-14:12): I spoke to Cardiologist (Dr. Cleve Navarro) regarding the patient and he has graciously accepted the patient under his service. Orders for transfer to telemetry has been placed.   Consultants/Specialty  None    Disposition  PENDING- " possibly [place] to live with his friend (My)    Review of Systems  Review of Systems   Constitutional: Negative.    HENT: Negative.    Eyes: Negative.    Respiratory: Negative.    Cardiovascular: Negative.    Gastrointestinal: Negative.    Musculoskeletal: Negative.    Neurological: Negative.    All other systems reviewed and are negative.       Physical Exam  Blood Pressure: (!) 97/70   Temperature: 36.6 °C (97.8 °F)   Pulse: (!) 110   Respiration: (!) 26   Pulse Oximetry: 100 %     Physical Exam   Constitutional: He is oriented to person, place, and time. No distress.   HENT:   Head: Normocephalic and atraumatic.   Eyes: Pupils are equal, round, and reactive to light.   Neck: Normal range of motion. Neck supple.   Cardiovascular: Normal rate, regular rhythm and normal heart sounds.    Pulmonary/Chest: Effort normal and breath sounds normal.   Abdominal: Soft. Bowel sounds are normal.   Musculoskeletal: Normal range of motion.   Neurological: He is alert and oriented to person, place, and time.   Skin: He is not diaphoretic.   Vitals reviewed.      Fluids    Intake/Output Summary (Last 24 hours) at 08/20/18 0716  Last data filed at 08/19/18 2045   Gross per 24 hour   Intake                0 ml   Output               25 ml   Net              -25 ml       Laboratory                          Assessment/Plan  Chronic combined systolic and diastolic congestive heart failure (HCC)- (present on admission)   Assessment & Plan    - Improving  - LVEF 20%  - Acute on chronic  - Systolic and diastolic dysfunction  - 2nd to drug abuse  - Continue Coreg, Lisinopril, Lasix, aldactone  - Patient on Comfort Care per patient's choice  - Patient's friend (My)  volunteered to take patient with her to [place] if her  (Sukhwinder) approves        Comfort measures only status   Assessment & Plan    - Unchanged  - Remains a complex disposition.  Hospice team has been involved.  Patient evaluated, discussed with patient given  difficulty of hospice placement, not a hospice candidate per report from hospice team to be discharged home.  At this time patient started acting goofy, inappropriate.  Similar to what happened upon my recent admission H&P and evaluation for this patient.  Overall pattern highly consistent with malingering, in order to refuse discharge.        - NOT imminently dying.  Drug-seeking behavior, narcotic seeking and benzodiazepine seeking behavior.  Extensive discussion with this patient by previous provider, discussed his wishes.  Discussed if he wants to continue ongoing comfort measures/ongoing management or what his preferences are.  Later in the day another extensive discussion in front of his wife and daughter.  Patient reports is not ready to go home.  Has nowhere to go home.  Has poor social support.  Reports he will take his medication and wants ongoing pain medications for comfort.  He reports that he wants to maintain comfort measures CODE STATUS and wants to continue therapy for his cardiac conditions with oral medications only.  - Will continue with current measurements  - Continue Lorazepam PRN  - Pt wants to have disbility process started for him  - Discussed with the staff during the rounds  - Pt is difficult discharge to another facility since he is comfort care  - Patient's friend (My)  volunteered to take patient with her to [place] if her  (Sukhwinder) approves; still awaiting response        Elevated BUN   Assessment & Plan    At this point we are not continuing with monitoring of his BUN.        Shortness of breath   Assessment & Plan    Shortness of breath at this point is secondary to his end-stage combined heart failure. The patient at this point will be continued on pain management and anxiety management.        Lactic acidosis   Assessment & Plan    We are no longer going to monitor at this point his lactic acid levels he is at this point elected to go on comfort care.        Generalized  anxiety disorder- (present on admission)   Assessment & Plan    - Controlled with current pain medication regimen  - Has history of anxiety, however not currently on medication at home and due to previously explained circumstances   - Will attempt nonpharmacological measures and reassess for need if anxiety persists         Polysubstance abuse- (present on admission)   Assessment & Plan    - Patient's history of methamphetamine is what caused him to have an ejection fraction of only 20%.  - Patient counseled regarding illicit drug use  - Patient tends to get agitated when speaking about his drug abuse and states his cardiomyopathy is due to him drinking energy drinks.        Obesity (BMI 30.0-34.9)- (present on admission)   Assessment & Plan    - Stable and unchanged  - At this point BMI currently is 30.8  - Continue to monitor

## 2018-08-20 NOTE — DISCHARGE PLANNING
Anticipated Discharge Disposition: community/[facility]    Action: Talked to patient about My/his family not wanting to take him into there home.  Patient stated he was aware of this, as both My and his mom where in early today and told him. Patient stated he doesn't know what he is going to do at this time.  Patient asked why he was not given anything for an anxiety attack he had last night.  Before questions could be asked, patient stated two nurse came in and stated since he is on comfort care and a known drug user they aren't going to give him anything for it.   I clarified with patient his understanding on what comfort care is again.  Dr. Juan discussed with the patient the differences between comfort care and full code in detail.  Dr. Juan explained to the patient that a referral would be made to for a cardio consult.  Patient stated that he wants to live and would want everything done for him.  Patient stated he wants to be full code.    I explained to the patient that as a possible d/c plan would be to submit a referral for [facility] once he is medically ready.     Barriers to Discharge: medical clearance    Plan: no social work needs identified at this time

## 2018-08-20 NOTE — CARE PLAN
Problem: Psychosocial Needs:  Goal: Level of anxiety will decrease    Intervention: Identify and develop with patient strategies to cope with anxiety triggers  Spoke with pt and developed strategies to cope with anxiety. Low stimulus environment, positioning, and rest were identified strategies to cope with anxiety.       Problem: Psychosocial Needs:  Goal: Mental status will improve    Intervention: Monitor mental and psychological status  Pt is agitated about non eligibility for a heart transplant. A review of the pt's notes reveals that he understood and desired hospice care during current admission.

## 2018-08-20 NOTE — PROGRESS NOTES
At 2108 Pt was given ativan to decrease anxiety. The ativan did not help the pt, he is still anxious. Spoke with preceptor and charge nurse about MEWS score of 4 no further treatment given due to comfort care status.

## 2018-08-21 PROBLEM — I42.8 NONISCHEMIC CARDIOMYOPATHY (HCC): Chronic | Status: ACTIVE | Noted: 2018-01-01

## 2018-08-21 NOTE — PROGRESS NOTES
[facility]    Date of Service: 2018    Chief Complaint  38 y.o. male admitted 2018 with shortness of breath    Interval Problem Update  No issues overnight  No events on telemetry except he remains in sinus tachycardia  Complains of shortness of breath and dyspnea on exertion    Consultants/Specialty  Cardiology    Disposition  Possibly home in 1-2 days        Review of Systems   Constitutional: Negative for chills and fever.   HENT: Negative for hearing loss and sore throat.    Eyes: Negative for blurred vision and double vision.   Respiratory: Positive for cough and shortness of breath. Negative for sputum production.    Cardiovascular: Positive for orthopnea. Negative for chest pain, palpitations and leg swelling.   Gastrointestinal: Negative for nausea and vomiting.   Genitourinary: Negative for dysuria and frequency.   Musculoskeletal: Negative for back pain and myalgias.   Skin: Negative for itching and rash.   Neurological: Negative for dizziness and headaches.   Psychiatric/Behavioral: Negative for depression. The patient is not nervous/anxious.       Physical Exam  Laboratory/Imaging   Hemodynamics  Temp (24hrs), Av.9 °C (98.5 °F), Min:36.1 °C (96.9 °F), Max:37.3 °C (99.2 °F)   Temperature: 37.1 °C (98.7 °F)  Pulse  Av.8  Min: 62  Max: 117   Blood Pressure: 102/62      Respiratory      Respiration: 16, Pulse Oximetry: 97 %     Work Of Breathing / Effort: Mild  RUL Breath Sounds: Clear, RML Breath Sounds: Clear, RLL Breath Sounds: Clear, RENE Breath Sounds: Clear, LLL Breath Sounds: Clear    Fluids    Intake/Output Summary (Last 24 hours) at 18 1638  Last data filed at 18 0658   Gross per 24 hour   Intake                0 ml   Output              900 ml   Net             -900 ml       Nutrition  Orders Placed This Encounter   Procedures   • Diet Order Cardiac, 2 Gram Sodium     Standing Status:   Standing     Number of Occurrences:   1     Order Specific  Question:   Diet:     Answer:   Cardiac [6]     Order Specific Question:   Diet:     Answer:   2 Gram Sodium [7]     Physical Exam   Constitutional: He is oriented to person, place, and time. He appears well-developed and well-nourished.   HENT:   Head: Normocephalic and atraumatic.   Eyes: Pupils are equal, round, and reactive to light. Conjunctivae and EOM are normal.   Neck: Normal range of motion. Neck supple.   Cardiovascular: Normal rate and regular rhythm.    Pulmonary/Chest: Effort normal and breath sounds normal. No respiratory distress.   Abdominal: Soft. Bowel sounds are normal. He exhibits no distension.   Musculoskeletal: He exhibits no edema or tenderness.   Neurological: He is alert and oriented to person, place, and time.   Skin: Skin is warm and dry.   Psychiatric: He has a normal mood and affect. His behavior is normal.   Nursing note and vitals reviewed.                               Assessment/Plan     Chronic combined systolic and diastolic congestive heart failure (HCC)- (present on admission)   Assessment & Plan    - LVEF 20%  - Acute on chronic  - Systolic and diastolic dysfunction  - 2nd to drug abuse  - Continue Coreg, Lisinopril, Lasix, aldactone        Comfort measures only status   Assessment & Plan    Now wants to be a full code        Elevated BUN   Assessment & Plan    At this point we are not continuing with monitoring of his BUN.        Shortness of breath   Assessment & Plan    Shortness of breath at this point is secondary to his end-stage combined heart failure. The patient at this point will be continued on pain management and anxiety management.        Lactic acidosis   Assessment & Plan    We are no longer going to monitor at this point his lactic acid levels he is at this point elected to go on comfort care.        Generalized anxiety disorder- (present on admission)   Assessment & Plan    - Controlled with current pain medication regimen  - Has history of anxiety, however  not currently on medication at home and due to previously explained circumstances   - Will attempt nonpharmacological measures and reassess for need if anxiety persists         Polysubstance abuse- (present on admission)   Assessment & Plan    - Patient's history of methamphetamine is what caused him to have an ejection fraction of only 20%.  - Patient counseled regarding illicit drug use  - Patient tends to get agitated when speaking about his drug abuse and states his cardiomyopathy is due to him drinking energy drinks.        Obesity (BMI 30.0-34.9)- (present on admission)   Assessment & Plan    - Stable and unchanged  - At this point BMI currently is 30.8  - Continue to monitor          Quality-Core Measures   Reviewed items::  Radiology images reviewed, Labs reviewed and Medications reviewed  DVT prophylaxis pharmacological::  Enoxaparin (Lovenox)

## 2018-08-21 NOTE — PROGRESS NOTES
Received report from day shift RN, assumed pt care. A&O x 4. No report of pain at this time. Fall precautions in place. Call light and bedside table within reach. Assessment completed. Updated pt about POC, pt verbalized understanding. Will continue to monitor.

## 2018-08-21 NOTE — PROGRESS NOTES
Monitor Summary:     ST: 110 - 115  2nd degree Type II HB (non-sustaining and asymptomatic, MD aware)  Rare PVC's  .16/.08/.40

## 2018-08-21 NOTE — PROGRESS NOTES
2 RN skin check completed with CLEMENTE Davis:     Generalized skin intact.  Bilat ears intact.  Bilat elbows intact.  Bilat heels calloused and blanching.  Sacral area intact.

## 2018-08-21 NOTE — PROGRESS NOTES
Cardiology Progress Note               Author: MARGUERITE Rodriguez Date & Time created: 8/21/2018  11:35 AM     Interval History:  Mr. Arteaga is a 38-year-old male with a history of systolic heart failure with nonischemic cardiomyopathy related to remote methamphetamine use.  He is admitted for CHF exacerbation.  Earlier in his hospital course he had voiced wanting to be comfort care and was taken off his medications.  Hospice evaluated him and did not feel he was hospice candidate recommending discharge home.  He has now a full code.    Chief Complaint:  Cardiology consultation for systolic heart failure.    8/21/2018: Patient lying in bed in no distress.  He reports shortness of breath with minimal movement.  He has orthopnea.  He also has a cough.  He denies palpitations or chest discomfort.    Telemetry: -115,   Labs: No labs today    Review of Systems   Constitutional: Negative for chills and fever.   Respiratory: Positive for cough and shortness of breath. Negative for hemoptysis, sputum production and wheezing.    Cardiovascular: Positive for orthopnea and PND. Negative for chest pain, palpitations, claudication and leg swelling.   Gastrointestinal: Negative for nausea and vomiting.   Neurological: Negative for dizziness and headaches.     Physical Exam   Constitutional: He is oriented to person, place, and time. He appears well-developed and well-nourished.   HENT:   Head: Normocephalic and atraumatic.   Eyes: EOM are normal.   Neck: Neck supple.   Cardiovascular: Normal rate, regular rhythm and normal heart sounds.    Pulmonary/Chest: Effort normal and breath sounds normal.   Abdominal: Soft. Bowel sounds are normal.   Musculoskeletal: He exhibits no edema.   Neurological: He is alert and oriented to person, place, and time.   Skin: Skin is warm and dry.   Psychiatric: He has a normal mood and affect. His behavior is normal. Judgment and thought content normal.   Nursing note and vitals  reviewed.    Hemodynamics:  Temp (24hrs), Av.9 °C (98.5 °F), Min:36.1 °C (96.9 °F), Max:37.3 °C (99.2 °F)  Temperature: 37.3 °C (99.2 °F)  Pulse  Av.7  Min: 62  Max: 117  Blood Pressure: 106/70     Respiratory:    Respiration: 20, Pulse Oximetry: 95 %  Fluids:        GI/Nutrition:  Orders Placed This Encounter   Procedures   • Diet Order Cardiac, 2 Gram Sodium     Standing Status:   Standing     Number of Occurrences:   1     Order Specific Question:   Diet:     Answer:   Cardiac [6]     Order Specific Question:   Diet:     Answer:   2 Gram Sodium [7]     Lab Results:    Echocardiography Laboratory 18  CONCLUSIONS  Compare to prior echo from 2016 - no significant change.  Left ventricular ejection fraction is visually estimated to be 20%.  Severe mitral and tricuspid regurgitation due to annular dilation.  Biatrial enlargement.  Estimated right ventricular systolic pressure of 50 mmHg is likely underestimated due to severe tricuspid regurgitation.    Medical Decision Making, by Problem:  Active Hospital Problems    Diagnosis   • Chronic combined systolic and diastolic congestive heart failure (HCC) [I50.42]   • Polysubstance abuse [F19.10]   • Generalized anxiety disorder [F41.1]   • Obesity (BMI 30.0-34.9) [E66.9]   • Comfort measures only status [Z51.5]     Plan:  1. Systolic heart failure  -Continue lisinopril 5 mg daily and Spironolactone 25 mg daily  -Change furosemide 40 mg p.o. daily  -EF 20%  -Discussed importance of fluid restriction, daily weights, sodium restriction  -Discussed importance of taking medications consistently    2. Chart history of methamphetamine abuse  -None by recent urine toxicology, remote and minimal use per patient.    Thank you for allowing us to participate in the care of your patient.     Quality-Core Measures   Reviewed items::  EKG reviewed, Labs reviewed, Radiology images reviewed and Medications reviewed  Dai catheter::  No Dai  DVT prophylaxis  pharmacological::  Enoxaparin (Lovenox) and Not indicated at this time, ambulatory  DVT prophylaxis - mechanical:  Not indicated at this time, ambulatory

## 2018-08-21 NOTE — PROGRESS NOTES
Patient declined transfer skin assessment, states he did not wish to remove his clothing. Next shift made aware, will reattempt.

## 2018-08-21 NOTE — PROGRESS NOTES
Updated MARGUERITE Horne about patient having more frequent runs of 2nd degree Type II heart block. Patient sleeping   Continue to monitor

## 2018-08-21 NOTE — PROGRESS NOTES
Notified hospitalist (Dr. Gracia) about pt exhibiting 2nd degree Type II heart block, per My (CCT) in monitor room. Pt is sleeping and in no apparent distress. Stat 12 Lead EKG ordered. Updated Dr. Gracia that pt's 12 lead EKG resulted as sinus tachycardia. Per Dr. Gracia, no new orders received.

## 2018-08-21 NOTE — PROGRESS NOTES
Assumed care at 0715. Bedside report received from Night RN Jennifer. Patient's chart and MAR reviewed. 12 hour chart check complete. Patient was updated on plan of care for the day. Questions answered and concerns addressed.  Pt denies any additional needs at this time. White board updated. Call light, phone and personal belongings within reach. Vital signs stable

## 2018-08-22 NOTE — CARE PLAN
Problem: Safety  Goal: Will remain free from falls  Outcome: PROGRESSING AS EXPECTED  Bed locked and in lowest position, call light and personal belongings in reach. Non skid socks on. Purposeful rounding. Up ad liz with use of single point cane, gait steady.      Problem: Psychosocial Needs:  Goal: Level of anxiety will decrease  Outcome: PROGRESSING AS EXPECTED  Active listening applied at all times. Ensure enough time to let patient express feelings. Anxiety medications administered per eMAR orders. Refocusing, relaxation and deep breathing encouraged.

## 2018-08-22 NOTE — DISCHARGE PLANNING
Received Transport Form @ [id number]  Spoke to Sanna @ [facility]    Transport is scheduled for 8/22/18 @1400 going to [address] [place] [address] RICHARD. Too CRUZ. [id number]

## 2018-08-22 NOTE — PROGRESS NOTES
Tele Monitor Summary  ST -113  Multiple episodes of Second degree type II when sleeping, patient asymptomatic.   .20/.08/.34

## 2018-08-22 NOTE — DISCHARGE PLANNING
Agency/Facility Name: [facility]  Spoke To: Lenore  Outcome: Taxi did not show up at 1400, called [facility] they set up for [employer] at 1500.

## 2018-08-22 NOTE — DISCHARGE SUMMARY
Discharge Summary    CHIEF COMPLAINT ON ADMISSION  Chief Complaint   Patient presents with   • Difficulty Breathing   • Weakness       Reason for Admission  Weakness, chest pain     Admission Date  8/5/2018    CODE STATUS  Full Code    HPI & HOSPITAL COURSE  This is a 38 y.o. male here with shortness of breath.  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  Apparently he has ongoing polysubstance abuse and medication noncompliance.  Apparently the patient was prescribed medications but was unable to fill those medications.  When he presented he is complaining of worsening shortness of breath and exertional dyspnea as well as dyspnea with lying flat.  He was diagnosed as having acute on chronic combined systolic and diastolic congestive heart failure.  He was put on supplemental oxygen due to respiratory distress.  He was started on diuresis and cardiac monitoring.  Of note he had an echocardiogram done on 7/28/2018.  This showed an ejection fraction of 20% with severe mitral and tricuspid regurgitation and biatrial enlargement.    The hospitalist complicated.  The patient continually misinterpreted what most doctors were saying.  He initially stated that he wanted a heart transplant.  When he was told that he was not a candidate for heart transplant due to ongoing methamphetamine abuse he then reported that he would like to be on comfort care measures only.  This was explained to him in depth by multiple providers.  He understood that with comfort care measures he would not be on any medications and the goal of treatment would be comfort only.  Hospice was consulted.  There was a pattern that is consistent with malingering.  Patient was put on low-dose Ativan for anxiety but any pain medications were discontinued.  There was no need for any ongoing pain control.  Because of his social status a disposition was quite difficult.  He was unable to be  discharged anywhere on hospice care.  Nonetheless for the majority of his hospitalization he is maintained on comfort measures only.  Despite being on comfort measures he was maintained on as needed oxygen.  We also continued carvedilol lisinopril Lasix and Aldactone for comfort measures.    A few days prior to being discharged the patient suddenly changed his mind and stated that he would like everything done.  He reported that he would want to be full code.  He does report that he is homeless and has no vertigo but he refused referrals to any facility that would possibly take him.  For this reason he was transferred to telemetry and his medication regimen was adjusted accordingly.  Cardiology continue to follow alongside.  The patient also demanded to be started on disability.  I advised him that he should first remain off of methamphetamine and be compliant with his medications.  He should follow-up with his PCP and cardiology as scheduled and at that point disability paperwork could be initiated.  He has been given all the information for disability.  His medications have been prescribed and sent to the pharmacy.  Initially he stated that he was unable to afford the medications but social work assisted with this process and the medications have been provided for free.  They are also all in the $4 list.  I did advise him that he should use the money for cigarettes and instead purchase his medications.       Therefore, he is discharged in guarded and stable condition to home with close outpatient follow-up.    The patient met 2-midnight criteria for an inpatient stay at the time of discharge.    Discharge Date  8/22/2018    FOLLOW UP ITEMS POST DISCHARGE  Follow-up with PCP and cardiology as soon as possible    DISCHARGE DIAGNOSES  Active Problems:    Chronic combined systolic and diastolic congestive heart failure (HCC) POA: Yes    Obesity (BMI 30.0-34.9) POA: Yes    Polysubstance abuse POA: Yes    Generalized  anxiety disorder POA: Yes    Comfort measures only status POA: Clinically Undetermined    Nonischemic cardiomyopathy (HCC) (Chronic) POA: Unknown  Resolved Problems:    Nausea POA: Clinically Undetermined      FOLLOW UP  Future Appointments  Date Time Provider Department Center   8/28/2018 2:10 PM Destini Lee M.D. [facility]   9/13/2018 2:00 PM JESSE Bah [facility] None     Cleve Navarro M.D.  [address]  [address]  [facility] [address]  [phone]    Schedule an appointment as soon as possible for a visit        MEDICATIONS ON DISCHARGE     Medication List      START taking these medications      Instructions   aspirin 81 MG EC tablet   Take 1 Tab by mouth every day.  Dose:  81 mg     furosemide 40 MG Tabs  Commonly known as:  LASIX   Take 1 Tab by mouth every day.  Dose:  40 mg     lisinopril 5 MG Tabs  Commonly known as:  PRINIVIL   Take 1 Tab by mouth every day.  Dose:  5 mg     LORazepam 0.5 MG Tabs  Commonly known as:  ATIVAN   Take 1 Tab by mouth every 8 hours as needed for Anxiety for up to 5 days.  Dose:  0.5 mg     potassium chloride SA 10 MEQ Tbcr  Commonly known as:  K-DUR   Take 1 Tab by mouth every day.  Dose:  10 mEq     QUEtiapine 25 MG Tabs  Commonly known as:  SEROQUEL   Take 1 Tab by mouth every 8 hours.  Dose:  25 mg     spironolactone 25 MG Tabs  Commonly known as:  ALDACTONE   Take 1 Tab by mouth every day.  Dose:  25 mg            Allergies  No Known Allergies    DIET  Orders Placed This Encounter   Procedures   • Diet Order Cardiac, 2 Gram Sodium     Standing Status:   Standing     Number of Occurrences:   1     Order Specific Question:   Diet:     Answer:   Cardiac [6]     Order Specific Question:   Diet:     Answer:   2 Gram Sodium [7]       ACTIVITY  As tolerated.  Weight bearing as tolerated    CONSULTATIONS  Cardiology  Palliative care    PROCEDURES  None    LABORATORY  Lab Results   Component Value Date    SODIUM 138 08/22/2018    POTASSIUM 4.3 08/22/2018     CHLORIDE 104 08/22/2018    CO2 24 08/22/2018    GLUCOSE 84 08/22/2018    BUN 17 08/22/2018    CREATININE 0.78 08/22/2018    CREATININE 0.9 02/17/2008        Lab Results   Component Value Date    WBC 7.8 08/22/2018    HEMOGLOBIN 14.9 08/22/2018    HEMATOCRIT 47.2 08/22/2018    PLATELETCT 253 08/22/2018        Total time of the discharge process exceeds 42 minutes.

## 2018-08-22 NOTE — DISCHARGE PLANNING
Anticipated Discharge Disposition: D/C to Shelter/Self-Care    Action: LSW met with pt at bedside, additional unit ALEXX, Shereen, also present. LSW spoke with pt about getting his disability paperwork filled out at the cardiology clinic at his f/u appt. LSW provided pt with the phone number to [employer] and advisement to speak with the  available for disability. Pt was provided with a list of shelters, a medical transportation flier, and a prescription food pantry referral. LSW agreed to call the [facility] in [facility] to check on status of pt's meds and confirm he has no co-pay.    Barriers to Discharge: None.    Plan: LSW will confirm pt's medications are ready for pick-up at [facility] in [facility]. Pt will discharge to self-care/shelter. No further d/c planning needs.

## 2018-08-22 NOTE — DISCHARGE INSTRUCTIONS
Discharge Instructions    Discharged to home by car with self. Discharged via wheelchair, hospital escort: Yes.  Special equipment needed: Cane    Be sure to schedule a follow-up appointment with your primary care doctor or any specialists as instructed.     Discharge Plan:   Pneumococcal Vaccine Administered/Refused: Not given - Patient refused pneumococcal vaccine  Influenza Vaccine Indication: Patient Refuses    I understand that a diet low in cholesterol, fat, and sodium is recommended for good health. Unless I have been given specific instructions below for another diet, I accept this instruction as my diet prescription.   Other diet: Cardiac Diet    Special Instructions:   HF Patient Discharge Instructions  · Monitor your weight daily, and maintain a weight chart, to track your weight changes.   · Activity as tolerated, unless your Doctor has ordered otherwise. Other activity order: As tolerated.  · Follow a low fat, low cholesterol, low salt diet unless instructed otherwise by your Doctor. Read the labels on the back of food products and track your intake of fat, cholesterol and salt.   · Fluid Restriction No. If a Fluid Restriction has been ordered by your Doctor, measure fluids with a measuring cup to ensure that you are not exceeding the restriction.   · No smoking.  · Oxygen No. If your Doctor has ordered that you wear Oxygen at home, it is important to wear it as ordered.  · Did you receive an explanation from staff on the importance of taking each of your medications and why it is necessary to keep taking them unless your doctor says to stop? Yes  · Were all of your questions answered about how to manage your heart failure and what to do if you have increased signs and symptoms after you go home? Yes  · Do you feel like your heart failure care team involved you in the care treatment plan and allowed you to make decisions regarding your care while in the hospital and addressed any discharge needs you  might have? Yes    See the educational handout provided at discharge for more information on monitoring your daily weight, activity and diet. This also explains more about Heart Failure, symptoms of a flare-up and some of the tests that you have undergone.     Warning Signs of a Flare-Up include:  · Swelling in the ankles or lower legs.  · Shortness of breath, while at rest, or while doing normal activities.   · Shortness of breath at night when in bed, or coughing in bed.   · Requiring more pillows to sleep at night, or needing to sit up at night to sleep.  · Feeling weak, dizzy or fatigued.     When to call your Doctor:  · Call [facility] seven days a week from 8:00 a.m. to 8:00 p.m. for medical questions [phone].  · Call your Primary Care Physician or Cardiologist if:   1. You experience any pain radiating to your jaw or neck.  2. You have any difficulty breathing.  3. You experience weight gain of 3 lbs in a day or 5 lbs in a week.   4. You feel any palpitations or irregular heartbeats.  5. You become dizzy or lose consciousness.   If you have had an angiogram or had a pacemaker or AICD placed, and experience:  1. Bleeding, drainage or swelling at the surgical / puncture site.  2. Fever greater than 100.0 F  3. Shock from internal defibrillator.  4. Cool and / or numb extremities.      · Is patient discharged on Warfarin / Coumadin?   No     Depression / Suicide Risk    As you are discharged from this [facility], it is important to learn how to keep safe from harming yourself.    Recognize the warning signs:  · Abrupt changes in personality, positive or negative- including increase in energy   · Giving away possessions  · Change in eating patterns- significant weight changes-  positive or negative  · Change in sleeping patterns- unable to sleep or sleeping all the time   · Unwillingness or inability to communicate  · Depression  · Unusual sadness, discouragement and  loneliness  · Talk of wanting to die  · Neglect of personal appearance   · Rebelliousness- reckless behavior  · Withdrawal from people/activities they love  · Confusion- inability to concentrate     If you or a loved one observes any of these behaviors or has concerns about self-harm, here's what you can do:  · Talk about it- your feelings and reasons for harming yourself  · Remove any means that you might use to hurt yourself (examples: pills, rope, extension cords, firearm)  · Get professional help from the community (Mental Health, Substance Abuse, psychological counseling)  · Do not be alone:Call your Safe Contact- someone whom you trust who will be there for you.  · Call your local CRISIS HOTLINE 074-8713 or 490-261-2428  · Call your local Children's Mobile Crisis Response Team [place] (422) 433-3439 or www.[employer]  · Call the toll free National Suicide Prevention Hotlines   · National Suicide Prevention Lifeline 781-645-UOON (9087)  · National Hope Line Network 800-SUICIDE (516-7179)

## 2018-08-22 NOTE — PROGRESS NOTES
Received bedside report from CLEMENTE Mendez. Patient resting quietly in bed, no needs voiced. Call light within reach, bed in low & locked position.

## 2018-08-22 NOTE — PROGRESS NOTES
Report received from off going RN. Plan of care reviewed with patient and all questions answered. Bed locked and in lowest position, call light and personal belongings in reach. Non skid socks on. Purposeful rounding. Patient ambulating in hallways. No complaints at this time, requesting medications to help him sleep.

## 2018-08-22 NOTE — DISCHARGE PLANNING
"Anticipated Discharge Disposition: D/C to Self-Care/Shelter    Action: LSW informed by RN that pt's mother, Sonia (ph#[phone]), would like information regarding d/c planning for pt. LSW advised that pt's medical information cannot be shared until he gives consent. LSW met with pt at bedside to discuss above and he reported he would tell her that he is discharging to the shelter, pt did not give authorization to release his medical information. Pt stated, \"my mom kicked me and my son out on the streets when she was drunk.\" EM was not signed. Pt is A&Ox4, he is able to make his own medical decisions.    LSW contacted Sonia and notified of above, she stated she \"pretty much knows everything happening anyways,\" and asked if he was discharging to the shelter. LSW informed Sonia that she would need to speak with pt regarding his planning at this time as LSW cannot disclose confidential information.    Barriers to Discharge: None.    Plan: LSW awaiting pt's scripts to be sent to [facility] in [place]. LSW will assist in arranging transport to pt's pharmacy once his meds are filled and ready for pick-up.     "

## 2018-08-22 NOTE — HEART FAILURE PROGRAM
Cardiovascular Nurse Navigator () Advanced Heart Failure Program Inpatient Progress Note:    Current provider notes indicating that  is likely to be discharged in 1-2 days.  has changed his mind about comfort care status and is now full code. 's family has been present at bedside early on in this admission. They have refused to allow him to come home to live with them.     has said in the past, that he doesn't take his medications when he leaves the hospital because he cannot afford them.    As this [employer] noted on 8/10, all of patient's heart failure medications and follow up appointments are covered by Medicaid with no out of pocket cost to . This author also noted at that time that there may be other issues such as transport to the pharmacy and HF clinic, but affordability is not the issue.     At the time, this [employer] had placed an order for SW to assess whether or not patient could be provided with assistance for transportation to and from pharmacy and appointments. A review of [facility] Plan notes doesn't show that this was addressed as the focus became SNF and Hospice discharge. Will re-order SW to assess transportation resources today.    Intake and Output documentation has become more complete as of yesterday. Also, patient was weighed on standing scale.     Thank you to bedside nursing and CNA's for all of this as these are very important indicators of volume status.    Finally,  has now changed his mind about his goals of care but he has still not completed a POLST.      has estranged relations with his next of kin which could make for a difficult situation if he were unable to make his own decisions for care in the future, which with an EF of 20% and repeated non compliance, is not an unimaginable scenario.     Strongly suggest that we revisit POLST completion with  prior to discharge.    Thank you and please call with questions, Concetta    Your appointments     Aug 28, 2018  2:10  PM PDT  New Patient with Destini Lee M.D.  [facility] [address]  [facility] [address]  [phone]      Sep 13, 2018  2:00 PM PDT  Heart Failure New Patient with JESSE Bah  [facility] (--) [address], [facility] [address]  [facility] [address]  [phone]

## 2018-08-23 NOTE — PROGRESS NOTES
Placed discharge outreach phone call to pt s/p hospital discharge 8/22/18.  Left voicemail providing my contact information and instructions to call with any questions or concerns.

## 2018-08-28 PROBLEM — E66.9 OBESITY (BMI 30-39.9): Status: ACTIVE | Noted: 2018-01-01

## 2018-08-28 NOTE — NON-PROVIDER
Medical Social Work    R: Dr. Lee/[facility]    I: This msw intern karissa black provided pt with information on MTM and [facility] for pt to have a place to stay. This SW called and made sure there were beds avail. For pt. MSW intern also gave pt resources for MTM incase he needs rides to [facility] in the future.    P: This msw intern Karissa Black will attempt to make contact with pt on 9/6/2018.

## 2018-08-28 NOTE — PROGRESS NOTES
CC: Marci anxiety and depression, hospital discharge follow-up for cardiomyopathy    HPI:  New patient   presents today  here to establish care, recently after hospital discharge for cardiomyopathy and congestive heart failure at this observation, 38 years old male with past medical history significant for congestive heart failure and low ejection fraction, history of anxiety and depression, obesity, reviewed hospital records today. Reviewed past medical problems. Past surgical history, family/social history and review of medications discussed the following concerns. His follow-up today:      Chronic systolic (congestive) heart failure (HCC) patient was first diagnosed with congestive heart failure/cardiomyopathy in 2016, was supposed to be taking medications, but he stopped taking them for loss of medical care, recently admitted to the hospital with exacerbation of congestive heart failure was complaining of increased shortness of breath on exertion, patient ejection fraction was found to be around 20, he has an appointment to follow-up with cardiology in 2 weeks, concerned about his living situation. He is homeless at this time, unable to work. Wants to apply for disability. Denies drug use or alcohol use. Has been using all his medications from the hospital as directed. Reviewed medication list updated in Epic     Anxiety and depression   Reports having a lot of anxiety and depression especially after hospital discharge and his social situation, unable to work and unable to find a place to live and he is homeless at this time. Patient said he is very anxious. Discussed with patient. Previous medications he is on Seroquel and Ativan from the hospital. He said he ran out of Ativan. Wants to stop the Seroquel because it was not helping him and making him feel very drowsy, patient said he would like to try another medication for anxiety, depression. Discussed SSRIs like Zoloft. Patient declined it and said that he  use it in the past and gave him side effects and does not want tried again. Denies suicidal ideations or intentions to hurt self or others     Obesity (BMI 30-39.9)   BMI around 30. Patient living and social situation. At this time is difficult to  him about his diet or to introduce exercise because of heart condition      Patient Active Problem List    Diagnosis Date Noted   • Chronic combined systolic and diastolic congestive heart failure (HCC) 07/27/2018     Priority: High   • Acute respiratory failure with hypoxia (HCC) 07/27/2018     Priority: High   • Chronic systolic (congestive) heart failure (HCC) 07/29/2018     Priority: Medium   • Polysubstance abuse 07/29/2018     Priority: Low   • Generalized anxiety disorder 07/29/2018     Priority: Low   • Obesity (BMI 30.0-34.9) 07/27/2018     Priority: Low   • Obesity (BMI 30-39.9) 08/28/2018   • Nonischemic cardiomyopathy (HCC) 08/21/2018   • Comfort measures only status 08/07/2018   • Lactic acidosis 08/06/2018   • Shortness of breath 08/06/2018   • Elevated BUN 08/06/2018   • CHF exacerbation ([facility]) 07/27/2018   • 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 60 Tab 2   • hydrOXYzine HCl (ATARAX) 25 MG Tab Take 1 Tab by mouth 3 times a day as needed for Itching. 30 Tab 0   • aspirin 81 MG EC tablet Take 1 Tab by mouth every day. 30 Tab 0   • furosemide (LASIX) 40 MG Tab Take 1 Tab by mouth every day. 30 Tab 0   • lisinopril (PRINIVIL) 5 MG Tab Take 1 Tab by mouth every day. 30 Tab 0   • potassium chloride SA (K-DUR) 10 MEQ Tab CR Take 1 Tab by mouth every day. 30 Tab 0    • QUEtiapine (SEROQUEL) 25 MG Tab Take 1 Tab by mouth every 8 hours. 90 Tab 0   • spironolactone (ALDACTONE) 25 MG Tab Take 1 Tab by mouth every day. 30 Tab 0     No current facility-administered medications for this visit.          Allergies as of 08/28/2018   • (No Known Allergies)        ROS: Denies any chest pain, Shortness of breath, Changes bowel or bladder, Lower extremity edema.    Physical Exam:  Gen.: Well-developed, well-nourished, no apparent distress, looks depressed and cooperative with the examination  Skin:  Warm and dry with good turgor. No rashes or suspicious lesions in visible areas, multiple skin tattoos  Eye: PERRLA, conjunctiva and sclera clear, lids normal  HEENT: Normocephalic/atraumatic, sinuses nontender with palpation, TMs clear, nares patent with pink mucosa and clear rhinorrhea, lips without lesions, oropharynx clear.  Neck: Trachea midline,no masses or adenopathy  Thyroid: normal consistency and size. No masses or nodules. Not tender with palpation.  Cor: Regular rate and heartrate today above 100, without murmur, gallop or rub.  Lungs: Respirations unlabored.Clear to auscultation with equal breath sounds bilaterally. No wheezes, rhonchi.  Abdomen: Soft nontender without hepatosplenomegaly or masses appreciated, normoactive bowel sounds. No hernias.  Extremities: No cyanosis, clubbing or edema, Symmetrical without deformities or malformations. Pulses 2+ and symmetrical both upper and lower extremities  Lymphatic: No abnormal adenopathy of the neck groin or axillae.  Psych: Alert and oriented x 3.Normal affect, judgement,insight and memory.        Assessment and Plan.   38 y.o. male here to establish care    1. Chronic systolic (congestive) heart failure (HCC)  Patient advised to continue all medications, diuretics and lisinopril. I will add Coreg because of tachycardia. Advised to follow-up with his cardiology placed a referral to cardiology today. Discussed importance of compliance  in taking all his medications as directed. Discussed possibility of defibrillator and this will be discussed by cardiology. Clinically stable at this time  - carvedilol (COREG) 6.25 MG Tab; Take 1 Tab by mouth 2 times a day, with meals.  Dispense: 60 Tab; Refill: 2    2. Anxiety and depression  Declines x-rays MRI, requesting refill of Ativan. I declined it at this time. Advised to use hydroxyzine as directed and needed follow-up with psychiatry for further evaluation of depression and anxiety. Patient not suicidal at this time he declines the use of Seroquel that was prescribed by the hospital. On discharge  - REFERRAL TO PSYCHIATRY  - hydrOXYzine HCl (ATARAX) 25 MG Tab; Take 1 Tab by mouth 3 times a day as needed for Itching.  Dispense: 30 Tab; Refill: 0    3. Obesity (BMI 30-39.9)    - Patient identified as having weight management issue.  Appropriate orders and counseling given.    Please note that this dictation was created using voice recognition software. I have made every reasonable attempt to correct obvious errors but there may be errors of grammar and content that I may have overlooked prior to finalization of this note.

## 2018-08-31 NOTE — PSYCHIATRY
"PSYCHIATRIC CONSULTATION:  Reason for admission:   CHF  Reason for consult: severe anxiety   Requesting Physician: Meena    Legal status:  N/a     Chief Complaint: \"I can't stand it\"    HPI:   Pt is a 39 y/o man with history of anxiety, polysubstance abuse, who was just discharged from the hospital and represented with SOB after he didn't  his medication. He has been getting frequent anxiety attacks that worsen with physical symptoms, and he has been requesting medications for this. Has ejection fraction of 20%; has requested hospice, was seen by hospice team and deemed not to be a candidate due to drug seeking behavior, not imminently dying, and inappropriate behavior. Pt has refused discharge on several occasions. Pattern has been consistent with malingering. He has poor social support and reports he has nowhere to go.  Mother, wife, not willing to take him home. Trouble finding a receptive facility. Has been doing things like refusing oxygen, stating it doesn't help. Pt was placed on depakote for mood dysregulation on 7/30, recently stopped. Xanax stopped and klonopin was started for him on 7/30 and it ws increased. Risperdal ws not restarted on this admission but was started on last admission due to QT prolongation. Pt is not interested in continuing risperal and feels seroquel was more effective for him. While it has more long term side effects, in the current context of him having such poor EF and considering hospice, no problem with continuing the seroquel. He denies suicidal ideation. Still very justice focused       Psychiatric Review of Systems:current symptoms as reported by pt.  Depression:      Depressed mood   Manialability   Anxiety/Panic Attacks  yes  PTSD symptom:not assessed   Psychosis: No signs or symptoms   Other:       Medical Review of Systems: as reported by pt. All systems reviewed. Only those found to be + are noted below. All others are negative.    Unable to obtain full " ROS   Cardiovascular disease:  CHF, shortness of breath, leg swelling, tachycardia  +anxiety       Psychiatric Examination: observed phenomenon:  Vitals:Temp (24hrs), Av.2 °C (97.2 °F), Min:36.1 °C (96.9 °F), Max:36.8 °C (98.2 °F)   Temperature: 36.8 °C (98.2 °F)  Pulse  Av.2  Min: 78  Max: 111   Blood Pressure: (!) 92/58 (RN N otified)    Musculoskeletal: poor eye contact. Mild psychomotor retardation   Appearance: grooming fair   Thoughts:can be tangential, slightly paranoid   Speech: less pressured, not yelling   Mood:          Irritable   Affect:         Mildly labile   SI/HI:   Denies   Attention/Alertness:   Fair   Memory:    Grossly intact.   Orientation:    Grossly intact.   Fund of Knowledge:    Unable to fully assess  Insight/Judgement into symptoms: poor  Neurological Testing:          Past Psychiatric Hx:   No previous suicide attempts  Has been at [facility] before, unclear how long he was there  Has been on xanax, ativan, wellbutrin, depakote, risperdal, seroquel,     Family Psychiatric Hx:  Unknown     Social Hx:  Social History     Social History   • Marital status: Single     Spouse name: N/A   • Number of children: N/A   • Years of education: N/A     Occupational History   • Not on file.     Social History Main Topics   • Smoking status: Former Smoker     Packs/day: 0.07     Types: Cigarettes   • Smokeless tobacco: Never Used   • Alcohol use Yes      Comment: social   • Drug use: Yes     Types: Methamphetamines      Comment: sober 2 and Half years    • Sexual activity: Not Currently     Partners: Female     Other Topics Concern   • Not on file     Social History Narrative   • No narrative on file   nowhere to live       Drug/Alcohol/Tobacco Hx:   Drugs:METH    Alcohol:+   Tobacco: +    Medical Hx: labs, MARS, medications, etc were reviewed. Only those findings of potential interest to psychiatry are noted below:  Past Medical History:   Diagnosis Date   • Congestive heart failure (HCC)    •  Elevated BUN 8/6/2018   • Gastritis          Allergies: Patient has no known allergies.    Medications:  Labs:    08/05/18   2350   WBC  8.6   RBC  4.55*   HEMOGLOBIN  12.9*   HEMATOCRIT  40.6*   MCV  89.2   MCH  28.4   MCHC  31.8*   RDW  45.7   PLATELETCT  318   MPV  9.7          Recent Labs      08/05/18   2350   SODIUM  138   POTASSIUM  4.1   CHLORIDE  107   CO2  19*   GLUCOSE  108*   BUN  25*   CREATININE  0.86   CALCIUM  8.9               Recent Labs      08/05/18   2350  08/06/18   0849   BNPBTYPENAT  714*  598*            ASSESSMENT:   Polysubstance Abuse   Chronic combined systolic and diastolic congestive heart failure   Shortness of breath  Lactic acidosis   Sinus tachycardia    PLAN:    Seroquel for anxiety   Ativan is okay, better than xanax, short acting medication is fine under the circumstances with this patient.   No criteria for hold at this time.

## 2018-09-04 NOTE — TELEPHONE ENCOUNTER
This MSW intern Mami Black attempted to contact pt to see if there was any thing else I could do to help. Pt did not answer so I left a VM asking him to please call me back. Will follow pt as needed.

## 2018-09-13 PROBLEM — E87.20 LACTIC ACIDOSIS: Status: RESOLVED | Noted: 2018-01-01 | Resolved: 2018-01-01

## 2018-09-13 PROBLEM — R79.9 ELEVATED BUN: Status: RESOLVED | Noted: 2018-01-01 | Resolved: 2018-01-01

## 2018-09-13 PROBLEM — E66.9 OBESITY (BMI 30-39.9): Status: RESOLVED | Noted: 2018-01-01 | Resolved: 2018-01-01

## 2018-09-13 PROBLEM — I50.20 ACC/AHA STAGE C SYSTOLIC HEART FAILURE (HCC): Status: ACTIVE | Noted: 2018-01-01

## 2018-09-13 PROBLEM — I50.9 CHF EXACERBATION (HCC): Status: RESOLVED | Noted: 2018-01-01 | Resolved: 2018-01-01

## 2018-09-13 PROBLEM — Z51.5 COMFORT MEASURES ONLY STATUS: Status: RESOLVED | Noted: 2018-01-01 | Resolved: 2018-01-01

## 2018-09-13 PROBLEM — J96.01 ACUTE RESPIRATORY FAILURE WITH HYPOXIA (HCC): Status: RESOLVED | Noted: 2018-01-01 | Resolved: 2018-01-01

## 2018-09-13 NOTE — PROGRESS NOTES
"Education Narrative  Reviewed anatomy and physiology of heart failure with patient. Went over heart failure worksheet and patient's individual HF diagnosis, EF, risk factors, general medication classes and indications, as well as personal goals.  Goals: Patient's primary goal is start weighing daily and watch sodium intake more seriously.     Discussed daily weights, sodium restriction, worsening signs and symptoms to report to physician, heart medications, and importance of adherence to medication regimen. Emphasized recommendation from AHA/AAHFN to keep daily sodium intake between 1500mg-2000mg.      Reviewed dietary handouts, advanced care planning classes, and advance directive planning handout. Discussed dietary considerations and reviewed Seven Day Heart Healthy Meal Plan by e994.     Invited patient and family members/friends to HF support group and encouraged patient to call Heart Failure clinic during normal business hours with any questions.  Heart Failure program card with number given to patient.           Patient states full understanding of all information given.       OP Heart Failure  Vitals     Weight: 104.2 kg (229 lb 12.8 oz)  Weight Source: Stand Up Scale     Height: 182.9 cm (6' 0.01\")  BMI (Calculated): 31.16  Blood Pressure: 110/62  Pulse: (!) 110 (100-110)    System Assessment  NYHA Functional Class Assessment: Class IV  ACC/AHA HF Stage: C    Smoking Hx  Have you Ever Smoked: Yes  Have you Smoked in the Last 12 Mos: Yes  Have you Quit Smoking: No      Smoking Cessation Offered: Patient Counseled    Alcohol Hx  Do you Drink?: No                            Illicit Drug Hx  Illicit Drug History: Yes    Social Hx  Social History: Lives with Other  Level of Support: Unreliable  Advance Directives: Began discussion, None, Paperwork provided    Education  Symptoms: Verbalizes understanding  Weighing: Verbalizes Understanding  Weight Gain Response: Verbalizes Understanding   Recording Data: " Verbalizes understanding  Teach Back Failures: Gained 3 lbs in One Day or 5 lbs in One Week without Taking Action, Symptoms Occurred and Patient did not Respond as Instructed  Compliance: Missed Medication Dose(s), Changes in Diet       Medications  Medication Reconciliation : Complete  Medication Counseling Provided: Verbalizes Understanding  Able to Accurately Identify Medication Indications: Some  Medication Discrepancies: Use of Higher or Lower Dose  Is Patient on an Evidence Based Beta Blocker: Yes  Is Patient on ACE-1 or ARB: Yes    Dietary Assessment  Food Labels: Verbalizes Understanding  Foods High in Sodium: Verbalizes Understanding  Daily Sodium Intake: Verbalizes Understanding  Diet: Verbalizes Understanding  Food Preparation: Verbalizes Understanding  Eating Out Plan: Verbalizes understanding  Healthier Options: Verbalizes Understanding  Fluid Restriction: Not Applicable    MN Living with Heart Failure  Swelling in Ankles or Legs: 3  Having to Sit or Lie Down During the Day: 5  Walking About or Climbing Stairs Difficult: 5  Working Around the House or Yard Difficult: 5  Difficulty Going Away from Home: 4  Difficulty Sleeping Well at Night: 5  Difficulty Socializing with Family or Friends: 4  Difficulty Working to Earn a Livin  Difficulty with Recreational Pastimes, Sports, Hobbies: 5  Difficulty with Sexual Activities: 5  Eating Less Foods You Like: 4  Making you Short of Breath: 5  Tired, Fatigued or Low on Energy: 5  Making you Stay in a Hospital: 5  Costing you Money for Medical Care?: 0  Giving you Side Effects from Treatments: 3  Feeling like a [place] to Family and Friends: 5  Loss of Self Control in your Life: 5  Making You Worry: 5  Difficulty to Concentrate or Remembering Things: 2  Making you Feel Depressed: 5  MLHF Total Score : 90    6 Minute Walk Test  Baseline to end of test: 6:00  Total meters walked: 329.2  Comments:  (dyspnea- 9, fatigue- 10)

## 2018-09-13 NOTE — PROGRESS NOTES
Chief Complaint   Patient presents with   • Congestive Heart Failure       Subjective:   Ryan Arteaga is a 38 y.o. male who presents today for follow up on his heart failure.    Previous patient of the heart failure clinic back in 2016.  Patient has had some recent hospitalizations for heart failure.  Patient comes into the clinic for follow-up.    For his symptoms, patient reports he continues to have fatigue, weakness, occasional chest pain, palpitations, orthopnea occasional episodes of PND, lower extremity edema that comes and goes, shortness of breath at rest, with ADLs and exertion and some dizziness/lightheadedness.  Patient also is anxious about his heart stopping.    Patient did try to follow-up with his PCP, but she only prescribed hydroxyzine.  Patient has tried to follow-up with mental health, however, there is no availability until November.    Patient is not weighing himself at home.  Patient does not have a scale.  Patient does state his weight here in the clinic is about 15 pounds more than when he left the hospital.    Patient is asking if he qualifies for defibrillator.    Initial 6 minute walk test, patient was able to complete 329 m during his 6 minute walk test. His O2 saturation at baseline was 97% and at the end of the test, the O2 saturation was 99%. He reported level 9 of dyspnea on Mandeep scale.    MLWHF score 90    Additonally, patient has the following medical problems:    -History of methamphetamine use about 2-3 years ago.  Patient reports he has not used drugs since.    -Heart failure likely due to remote use of meth.    Past Medical History:   Diagnosis Date   • Anxiety    • Congestive heart failure (HCC)    • Elevated BUN 8/6/2018   • Gastritis      Past Surgical History:   Procedure Laterality Date   • ABDOMINAL EXPLORATION     • OTHER      Submandibular mass with removal     Family History   Problem Relation Age of Onset   • Other Mother         epilepsy   • Hypertension Mother     • Hypertension Father    • No Known Problems Sister    • Lung Disease Maternal Grandmother    • Heart Disease Maternal Grandmother    • Heart Disease Maternal Grandfather    • Lung Disease Maternal Grandfather         emphysema   • Lung Disease Paternal Grandfather         lung ca   • Heart Failure Paternal Grandmother      Social History     Social History   • Marital status: Single     Spouse name: N/A   • Number of children: N/A   • Years of education: N/A     Occupational History   • Not on file.     Social History Main Topics   • Smoking status: Light Tobacco Smoker     Packs/day: 0.07     Types: Cigarettes   • Smokeless tobacco: Never Used      Comment: social smoker   • Alcohol use Yes      Comment: social   • Drug use: Yes     Types: Methamphetamines      Comment: quit in [id number]   • Sexual activity: Not Currently     Partners: Female     Other Topics Concern   • Not on file     Social History Narrative   • No narrative on file     No Known Allergies  Outpatient Encounter Prescriptions as of 9/13/2018   Medication Sig Dispense Refill   • carvedilol (COREG) 12.5 MG Tab Take 1 Tab by mouth 2 times a day, with meals. 60 Tab 11   • furosemide (LASIX) 40 MG Tab Take 1 Tab by mouth every day. 30 Tab 11   • lisinopril (PRINIVIL) 5 MG Tab Take 1 Tab by mouth every day. 30 Tab 11   • potassium chloride SA (K-DUR) 10 MEQ Tab CR Take 1 Tab by mouth every day. 30 Tab 11   • spironolactone (ALDACTONE) 25 MG Tab Take 1 Tab by mouth every day. 30 Tab 11   • hydrOXYzine HCl (ATARAX) 25 MG Tab Take 1 Tab by mouth 3 times a day as needed for Itching. 30 Tab 0   • QUEtiapine (SEROQUEL) 25 MG Tab Take 1 Tab by mouth every 8 hours. 90 Tab 0   • [DISCONTINUED] carvedilol (COREG) 6.25 MG Tab Take 1 Tab by mouth 2 times a day, with meals. 60 Tab 2   • aspirin 81 MG EC tablet Take 1 Tab by mouth every day. 30 Tab 0   • [DISCONTINUED] furosemide (LASIX) 40 MG Tab Take 1 Tab by mouth every day. 30 Tab 0   • [DISCONTINUED]  "lisinopril (PRINIVIL) 5 MG Tab Take 1 Tab by mouth every day. 30 Tab 0   • [DISCONTINUED] potassium chloride SA (K-DUR) 10 MEQ Tab CR Take 1 Tab by mouth every day. 30 Tab 0   • [DISCONTINUED] spironolactone (ALDACTONE) 25 MG Tab Take 1 Tab by mouth every day. 30 Tab 0     No facility-administered encounter medications on file as of 9/13/2018.      Review of Systems   Constitutional: Positive for malaise/fatigue. Negative for fever.   Respiratory: Positive for shortness of breath. Negative for cough.    Cardiovascular: Positive for chest pain (occ), palpitations, orthopnea, leg swelling and PND (occ). Negative for claudication.   Gastrointestinal: Negative for abdominal pain.   Musculoskeletal: Negative for myalgias.   Neurological: Positive for dizziness and weakness.   Psychiatric/Behavioral: The patient is nervous/anxious.    All other systems reviewed and are negative.       Objective:   /62   Pulse (!) 110   Ht 1.829 m (6' 0.01\")   Wt 104.2 kg (229 lb 12.8 oz)   SpO2 97%   BMI 31.16 kg/m²     Physical Exam   Constitutional: He is oriented to person, place, and time. He appears well-developed and well-nourished.   HENT:   Head: Normocephalic and atraumatic.   Eyes: Pupils are equal, round, and reactive to light. EOM are normal.   Neck: Normal range of motion. Neck supple. JVD present.   Cardiovascular: Normal rate, regular rhythm and normal heart sounds.    Pulmonary/Chest: Effort normal and breath sounds normal. No respiratory distress. He has no wheezes. He has no rales.   Abdominal: Soft. Bowel sounds are normal.   Musculoskeletal: He exhibits no edema.   Neurological: He is alert and oriented to person, place, and time.   Skin: Skin is warm and dry.   Psychiatric: He has a normal mood and affect. His behavior is normal.   Vitals reviewed.    Lab Results   Component Value Date/Time    CHOLSTRLTOT 150 07/29/2018 01:46 AM    LDL 96 07/29/2018 01:46 AM    HDL 19 (A) 07/29/2018 01:46 AM    TRIGLYCERIDE " 176 (H) 07/29/2018 01:46 AM       Lab Results   Component Value Date/Time    SODIUM 138 08/22/2018 02:53 AM    POTASSIUM 4.3 08/22/2018 02:53 AM    CHLORIDE 104 08/22/2018 02:53 AM    CO2 24 08/22/2018 02:53 AM    GLUCOSE 84 08/22/2018 02:53 AM    BUN 17 08/22/2018 02:53 AM    CREATININE 0.78 08/22/2018 02:53 AM    CREATININE 0.9 02/17/2008 06:58 AM     Lab Results   Component Value Date/Time    ALKPHOSPHAT 55 08/15/2018 04:24 PM    ASTSGOT 31 08/15/2018 04:24 PM    ALTSGPT 137 (H) 08/15/2018 04:24 PM    TBILIRUBIN 0.7 08/15/2018 04:24 PM      Transthoracic Echo Report 1/31/2016  Left ventricle is moderately dilated.  Severely reduced left ventricular systolic function.  Left ventricular ejection fraction is visually estimated to be 20%.  Grade III diastolic dysfunction (restrictive pattern).  Reduced right ventricular systolic function.  Severe mitral regurgitation.  Severe tricuspid regurgitation.  Right ventricular systolic pressure is estimated to be  45 mmHg.    The medical team was made aware of the above findings, the patient will be seen in cardiology consultation.    Transthoracic Echo Report 7/28/2018  Compare to prior echo from 2016 - no significant change.  Left ventricular ejection fraction is visually estimated to be 20%.  Severe mitral and tricuspid regurgitation due to annular dilation.  Biatrial enlargement.  Estimated right ventricular systolic pressure of 50 mmHg is likely underestimated due to severe tricuspid regurgitation.    Transthoracic Echo Report 7/20/2018  Full echocardiogram was done earlier today.  Thrombus is not observed in the left ventricular apex.     Assessment:     1. ACC/AHA stage C systolic heart failure (HCC)  carvedilol (COREG) 12.5 MG Tab    furosemide (LASIX) 40 MG Tab    lisinopril (PRINIVIL) 5 MG Tab    potassium chloride SA (K-DUR) 10 MEQ Tab CR    spironolactone (ALDACTONE) 25 MG Tab    BASIC METABOLIC PANEL   2. 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 Polysubstance abuse     5. Generalized anxiety disorder     6. SOB (shortness of breath)         Medical Decision Making:  Today's Assessment / Status / Plan:   1. HFrEF, Stage C, Class 4, LVEF 20%: pt does have some JVD but no LE edema  -Continue furosemide 40 mg daily (encouraged patient to monitor her home weights)  -Increase carvedilol to 12.5 mg twice a day  -Continue Spironolactone 25 mg daily  -Continue lisinopril 5 mg daily  -BMP in 1 week  -Discussed with patient the importance of taking prescribed medications and their role in his heart failure management.  -Reinforced s/sx of worsening heart failure with patient and weight monitoring. Pt verbalizes understanding. Pt to call office or RTC if present.   -Patient to monitor weights at home daily. Pt to call office if weight increasing >3 lbs in 1 day or >5 lbs in 1 week.   -Discussed with patient if EF remains less than 35% after repeating his echo in 3 months, we will consider ICD for primary prevention, to optimize his heart failure regimen    2.  History of methamphetamine use  -Continue to refrain from meth use    3.  Anxiety:  -Recommend follow-up with PCP to discuss ineffectiveness of hydroxyzine.  -Perhaps starting patient on long-term anxiety depression medication.    FU in clinic in 1 week with HF MF. Sooner if needed.    Patient verbalizes understanding and agrees with the plan of care.     Collaborating MD: William Anglin MD

## 2018-09-24 NOTE — TELEPHONE ENCOUNTER
EDDIE to remind patient to complete non-fasting  labs before upcoming appt with Dr. Galvan on 09/26/2018.

## 2018-09-26 NOTE — LETTER
[facility]   [address], [address]  [place] [address]  Phone: [phone]  Fax: [phone]              Ryan Arteaga  1980    Encounter Date: 9/26/2018    Eran Galvan M.D.          PROGRESS NOTE:  Chief Complaint   Patient presents with   • Cardiomyopathy (Ischemic)       Subjective:   Ryan Arteaga is a 38 y.o. male who presents today for cardiac care in the heart failure clinic for cardiac care of nonischemic cardiomyopathy. Patient does have a history of methamphetamine use. At this time he is taking carvedilol 12.5 mg by mouth twice a day, lisinopril 5 mg by mouth once a day and spironolactone 25 mg by mouth once a day. He is on Lasix 40 mg by mouth once a day.      Patient still gets winded with daily living activities and exertion. No symptoms at rest.    Not feeling good.    Report that he last used illicit drugs 2 years ago.    TTE in 07/2018 with LVEF of 10%, no thrombus. I have independently reviewed echocardiogram's actual images with patient in clinic with him.    Past Medical History:   Diagnosis Date   • Anxiety    • Congestive heart failure (HCC)    • Elevated BUN 8/6/2018   • Gastritis      Past Surgical History:   Procedure Laterality Date   • ABDOMINAL EXPLORATION     • OTHER      Submandibular mass with removal     Family History   Problem Relation Age of Onset   • Other Mother         epilepsy   • Hypertension Mother    • Hypertension Father    • No Known Problems Sister    • Lung Disease Maternal Grandmother    • Heart Disease Maternal Grandmother    • Heart Disease Maternal Grandfather    • Lung Disease Maternal Grandfather         emphysema   • Lung Disease Paternal Grandfather         lung ca   • Heart Failure Paternal Grandmother      Social History     Social History   • Marital status: Single     Spouse name: N/A   • Number of children: N/A   • Years of education: N/A     Occupational History   • Not on file.     Social  History Main Topics   • Smoking status: Light Tobacco Smoker     Packs/day: 0.07     Types: Cigarettes   • Smokeless tobacco: Never Used      Comment: social smoker   • Alcohol use Yes      Comment: social   • Drug use: Yes     Types: Methamphetamines      Comment: quit in [id number]   • Sexual activity: Not Currently     Partners: Female     Other Topics Concern   • Not on file     Social History Narrative   • No narrative on file     No Known Allergies  Outpatient Encounter Prescriptions as of 9/26/2018   Medication Sig Dispense Refill   • furosemide (LASIX) 40 MG Tab Take 1 Tab by mouth 2 Times a Day. 60 Tab 11   • carvedilol (COREG) 12.5 MG Tab Take 1 Tab by mouth 2 times a day, with meals. 60 Tab 11   • lisinopril (PRINIVIL) 5 MG Tab Take 1 Tab by mouth every day. 30 Tab 11   • potassium chloride SA (K-DUR) 10 MEQ Tab CR Take 1 Tab by mouth every day. 30 Tab 11   • spironolactone (ALDACTONE) 25 MG Tab Take 1 Tab by mouth every day. 30 Tab 11   • aspirin 81 MG EC tablet Take 1 Tab by mouth every day. 30 Tab 0   • QUEtiapine (SEROQUEL) 25 MG Tab Take 1 Tab by mouth every 8 hours. 90 Tab 0   • [DISCONTINUED] furosemide (LASIX) 40 MG Tab Take 1 Tab by mouth every day. 30 Tab 11   • hydrOXYzine HCl (ATARAX) 25 MG Tab Take 1 Tab by mouth 3 times a day as needed for Itching. (Patient not taking: Reported on 9/26/2018) 30 Tab 0     No facility-administered encounter medications on file as of 9/26/2018.      Review of Systems   Constitutional: Negative for diaphoresis and fever.   HENT: Negative for nosebleeds.    Eyes: Negative for blurred vision and double vision.   Respiratory: Positive for shortness of breath. Negative for cough.    Cardiovascular: Negative for chest pain and palpitations.   Gastrointestinal: Negative for abdominal pain.   Genitourinary: Negative for dysuria and frequency.   Musculoskeletal: Negative for falls and myalgias.   Skin: Negative for rash.   Neurological: Negative for dizziness, sensory  change and headaches.   Endo/Heme/Allergies: Does not bruise/bleed easily.   Psychiatric/Behavioral: Negative for depression and memory loss.        Objective:   /82 (BP Location: Left arm, Patient Position: Sitting, BP Cuff Size: Adult)   Pulse 94   Ht 1.829 m (6')   Wt 107.5 kg (237 lb)   SpO2 95%   BMI 32.14 kg/m²      Physical Exam   Constitutional: He is oriented to person, place, and time. No distress.   HENT:   Head: Normocephalic and atraumatic.   Right Ear: External ear normal.   Left Ear: External ear normal.   Eyes: Right eye exhibits no discharge. Left eye exhibits no discharge.   Neck: No JVD present. No thyromegaly present.   Cardiovascular: Normal rate, regular rhythm, normal heart sounds and intact distal pulses.  Exam reveals no gallop and no friction rub.    No murmur heard.  Pulmonary/Chest: No respiratory distress. He has rales.   Abdominal: Bowel sounds are normal. He exhibits no distension. There is no tenderness.   Musculoskeletal: He exhibits no edema or tenderness.   Neurological: He is alert and oriented to person, place, and time. No cranial nerve deficit.   Skin: Skin is warm and dry. He is not diaphoretic.   Psychiatric: He has a normal mood and affect. His behavior is normal.   Nursing note and vitals reviewed.      Assessment:     1. ACC/AHA stage C systolic heart failure (HCC)  furosemide (LASIX) 40 MG Tab   2. 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 Polysubstance abuse  URINE DRUG SCREEN   5. High risk medication use     6. NYHA class 4 heart failure with reduced ejection fraction (HCC)         Medical Decision Making:  Today's Assessment / Status / Plan:   Volume up today. Chest xray showed vascular congestion.  Will increase lasix to 40 mg bid.  Will increase Carvedilol to 25 mg po twice daily.  Continue Lisinopril 5 mg daily and  Spironolactone 25 mg po daily.  Will get stat urine drugs screen today, if negative, will send for ICD for primary prevention. I inform patient that he has to get the urine test today, otherwise, he might not be able to get the ICD.    Will continue to closely monitor for side effects of patient's high risk medication(s) including liver, renal function and electrolytes.    I will see patient back in our Heart Failure Clinic with lab tests and studies results in 1 week.    I thank you for referring patient to our Heart Failure Clinic today.        Destini Lee M.D.  [address] [place] [id number]  VIA In Basket

## 2018-09-26 NOTE — PROGRESS NOTES
Chief Complaint   Patient presents with   • Cardiomyopathy (Ischemic)       Subjective:   Ryan Arteaga is a 38 y.o. male who presents today for cardiac care in the heart failure clinic for cardiac care of nonischemic cardiomyopathy. Patient does have a history of methamphetamine use. At this time he is taking carvedilol 12.5 mg by mouth twice a day, lisinopril 5 mg by mouth once a day and spironolactone 25 mg by mouth once a day. He is on Lasix 40 mg by mouth once a day.      Patient still gets winded with daily living activities and exertion. No symptoms at rest.    Not feeling good.    Report that he last used illicit drugs 2 years ago.    TTE in 07/2018 with LVEF of 10%, no thrombus. I have independently reviewed echocardiogram's actual images with patient in clinic with him.    Past Medical History:   Diagnosis Date   • Anxiety    • Congestive heart failure (HCC)    • Elevated BUN 8/6/2018   • Gastritis      Past Surgical History:   Procedure Laterality Date   • ABDOMINAL EXPLORATION     • OTHER      Submandibular mass with removal     Family History   Problem Relation Age of Onset   • Other Mother         epilepsy   • Hypertension Mother    • Hypertension Father    • No Known Problems Sister    • Lung Disease Maternal Grandmother    • Heart Disease Maternal Grandmother    • Heart Disease Maternal Grandfather    • Lung Disease Maternal Grandfather         emphysema   • Lung Disease Paternal Grandfather         lung ca   • Heart Failure Paternal Grandmother      Social History     Social History   • Marital status: Single     Spouse name: N/A   • Number of children: N/A   • Years of education: N/A     Occupational History   • Not on file.     Social History Main Topics   • Smoking status: Light Tobacco Smoker     Packs/day: 0.07     Types: Cigarettes   • Smokeless tobacco: Never Used      Comment: social smoker   • Alcohol use Yes      Comment: social   • Drug use: Yes     Types: Methamphetamines      Comment:  quit in [id number]   • Sexual activity: Not Currently     Partners: Female     Other Topics Concern   • Not on file     Social History Narrative   • No narrative on file     No Known Allergies  Outpatient Encounter Prescriptions as of 9/26/2018   Medication Sig Dispense Refill   • furosemide (LASIX) 40 MG Tab Take 1 Tab by mouth 2 Times a Day. 60 Tab 11   • carvedilol (COREG) 12.5 MG Tab Take 1 Tab by mouth 2 times a day, with meals. 60 Tab 11   • lisinopril (PRINIVIL) 5 MG Tab Take 1 Tab by mouth every day. 30 Tab 11   • potassium chloride SA (K-DUR) 10 MEQ Tab CR Take 1 Tab by mouth every day. 30 Tab 11   • spironolactone (ALDACTONE) 25 MG Tab Take 1 Tab by mouth every day. 30 Tab 11   • aspirin 81 MG EC tablet Take 1 Tab by mouth every day. 30 Tab 0   • QUEtiapine (SEROQUEL) 25 MG Tab Take 1 Tab by mouth every 8 hours. 90 Tab 0   • [DISCONTINUED] furosemide (LASIX) 40 MG Tab Take 1 Tab by mouth every day. 30 Tab 11   • hydrOXYzine HCl (ATARAX) 25 MG Tab Take 1 Tab by mouth 3 times a day as needed for Itching. (Patient not taking: Reported on 9/26/2018) 30 Tab 0     No facility-administered encounter medications on file as of 9/26/2018.      Review of Systems   Constitutional: Negative for diaphoresis and fever.   HENT: Negative for nosebleeds.    Eyes: Negative for blurred vision and double vision.   Respiratory: Positive for shortness of breath. Negative for cough.    Cardiovascular: Negative for chest pain and palpitations.   Gastrointestinal: Negative for abdominal pain.   Genitourinary: Negative for dysuria and frequency.   Musculoskeletal: Negative for falls and myalgias.   Skin: Negative for rash.   Neurological: Negative for dizziness, sensory change and headaches.   Endo/Heme/Allergies: Does not bruise/bleed easily.   Psychiatric/Behavioral: Negative for depression and memory loss.        Objective:   /82 (BP Location: Left arm, Patient Position: Sitting, BP Cuff Size: Adult)   Pulse 94   Ht 1.829  m (6')   Wt 107.5 kg (237 lb)   SpO2 95%   BMI 32.14 kg/m²     Physical Exam   Constitutional: He is oriented to person, place, and time. No distress.   HENT:   Head: Normocephalic and atraumatic.   Right Ear: External ear normal.   Left Ear: External ear normal.   Eyes: Right eye exhibits no discharge. Left eye exhibits no discharge.   Neck: No JVD present. No thyromegaly present.   Cardiovascular: Normal rate, regular rhythm, normal heart sounds and intact distal pulses.  Exam reveals no gallop and no friction rub.    No murmur heard.  Pulmonary/Chest: No respiratory distress. He has rales.   Abdominal: Bowel sounds are normal. He exhibits no distension. There is no tenderness.   Musculoskeletal: He exhibits no edema or tenderness.   Neurological: He is alert and oriented to person, place, and time. No cranial nerve deficit.   Skin: Skin is warm and dry. He is not diaphoretic.   Psychiatric: He has a normal mood and affect. His behavior is normal.   Nursing note and vitals reviewed.      Assessment:     1. ACC/AHA stage C systolic heart failure (HCC)  furosemide (LASIX) 40 MG Tab   2. 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 Polysubstance abuse  URINE DRUG SCREEN   5. High risk medication use     6. NYHA class 4 heart failure with reduced ejection fraction (HCC)         Medical Decision Making:  Today's Assessment / Status / Plan:   Volume up today. Chest xray showed vascular congestion.  Will increase lasix to 40 mg bid.  Will increase Carvedilol to 25 mg po twice daily.  Continue Lisinopril 5 mg daily and Spironolactone 25 mg po daily.  Will get stat urine drugs screen today, if negative, will send for ICD for primary prevention. I inform patient that he has to get the urine test today, otherwise, he might not be able to get the ICD.    Will continue to closely monitor  for side effects of patient's high risk medication(s) including liver, renal function and electrolytes.    I will see patient back in our Heart Failure Clinic with lab tests and studies results in 1 week.    I thank you for referring patient to our Heart Failure Clinic today.

## 2018-10-12 NOTE — PROGRESS NOTES
Chief Complaint   Patient presents with   • CHF (Systolic)     F/V: 2 WEEK       Subjective:   Ryan Arteaga is a 38 y.o. male who presents today for follow-up on his heart failure.    Patient Dr. Galvan.  Patient was last seen in clinic on 9/26/2018.  During his last visit, his furosemide was increased to 40 mg twice a day and his carvedilol was increased to 25 mg twice a day.  Patient reports he does feel some fatigue after taking the carvedilol.  Patient did not take his carvedilol today.  Patient also reports he has been taking his furosemide 40 mg twice a day up until Monday when he ran out.  Patient states his pharmacy was unable to provide him a refill.  Patient did not know to call the office if he encountered problems.    Patient has been without a diuretic since Monday.  Patient reports a 7-8 pound weight gain.  Patient also reports increased shortness of breath with his ADLs and exertion, orthopnea, PND, chest pressure with shortness of breath, abdominal bloating and fatigue.  He denies any palpitations, dizziness, edema.    His home weights are up to 220-222 pounds.    Patient reports he did not get his drug screen.  Patient was positive for marijuana.  Patient states he does take CBD edibles.    Additonally, patient has the following medical problems:     -History of methamphetamine use about 2-3 years ago.  Patient reports he has not used drugs since.     -Heart failure likely due to remote use of meth.      Past Medical History:   Diagnosis Date   • Anxiety    • Congestive heart failure (HCC)    • Elevated BUN 8/6/2018   • Gastritis      Past Surgical History:   Procedure Laterality Date   • ABDOMINAL EXPLORATION     • OTHER      Submandibular mass with removal     Family History   Problem Relation Age of Onset   • Other Mother         epilepsy   • Hypertension Mother    • Hypertension Father    • No Known Problems Sister    • Lung Disease Maternal Grandmother    • Heart Disease Maternal Grandmother     • Heart Disease Maternal Grandfather    • Lung Disease Maternal Grandfather         emphysema   • Lung Disease Paternal Grandfather         lung ca   • Heart Failure Paternal Grandmother      Social History     Social History   • Marital status: Single     Spouse name: N/A   • Number of children: N/A   • Years of education: N/A     Occupational History   • Not on file.     Social History Main Topics   • Smoking status: Former Smoker     Packs/day: 0.07     Types: Cigarettes   • Smokeless tobacco: Never Used   • Alcohol use No   • Drug use: Yes     Types: Methamphetamines      Comment: quit in [id number]   • Sexual activity: Not Currently     Partners: Female     Other Topics Concern   • Not on file     Social History Narrative   • No narrative on file     No Known Allergies  Outpatient Encounter Prescriptions as of 10/12/2018   Medication Sig Dispense Refill   • furosemide (LASIX) 40 MG Tab Take 1 Tab by mouth 2 Times a Day. 60 Tab 11   • carvedilol (COREG) 12.5 MG Tab Take 1 Tab by mouth 2 times a day, with meals. 60 Tab 11   • lisinopril (PRINIVIL) 5 MG Tab Take 1 Tab by mouth every day. 30 Tab 11   • potassium chloride SA (K-DUR) 10 MEQ Tab CR Take 1 Tab by mouth every day. 30 Tab 11   • spironolactone (ALDACTONE) 25 MG Tab Take 1 Tab by mouth every day. 30 Tab 11   • aspirin 81 MG EC tablet Take 1 Tab by mouth every day. 30 Tab 0   • QUEtiapine (SEROQUEL) 25 MG Tab Take 1 Tab by mouth every 8 hours. 90 Tab 0   • hydrOXYzine HCl (ATARAX) 25 MG Tab Take 1 Tab by mouth 3 times a day as needed for Itching. 30 Tab 0     No facility-administered encounter medications on file as of 10/12/2018.      Review of Systems   Constitutional: Positive for malaise/fatigue. Negative for fever.   Respiratory: Positive for shortness of breath. Negative for cough.    Cardiovascular: Positive for chest pain (pressure with SOB), orthopnea and PND. Negative for palpitations, claudication and leg swelling.   Gastrointestinal: Negative  for abdominal pain.        Abdominal bloating   Musculoskeletal: Negative for myalgias.   Neurological: Negative for dizziness.   All other systems reviewed and are negative.       Objective:   /88 (BP Location: Right arm, Patient Position: Sitting, BP Cuff Size: Adult)   Pulse (!) 112   Ht 1.829 m (6')   Wt 107 kg (236 lb)   SpO2 93%   BMI 32.01 kg/m²     Physical Exam   Constitutional: He is oriented to person, place, and time. He appears well-developed and well-nourished.   HENT:   Head: Normocephalic and atraumatic.   Eyes: Pupils are equal, round, and reactive to light. EOM are normal.   Neck: Normal range of motion. Neck supple. JVD present.   Cardiovascular: Normal rate, regular rhythm and normal heart sounds.    Pulmonary/Chest: Effort normal and breath sounds normal. No respiratory distress. He has no wheezes. He has no rales.   Abdominal: Soft. Bowel sounds are normal.   Musculoskeletal: He exhibits no edema.   Neurological: He is alert and oriented to person, place, and time.   Skin: Skin is warm and dry.   Psychiatric: He has a normal mood and affect. His behavior is normal.   Vitals reviewed.    Lab Results   Component Value Date/Time    CHOLSTRLTOT 150 07/29/2018 01:46 AM    LDL 96 07/29/2018 01:46 AM    HDL 19 (A) 07/29/2018 01:46 AM    TRIGLYCERIDE 176 (H) 07/29/2018 01:46 AM       Lab Results   Component Value Date/Time    SODIUM 138 08/22/2018 02:53 AM    POTASSIUM 4.3 08/22/2018 02:53 AM    CHLORIDE 104 08/22/2018 02:53 AM    CO2 24 08/22/2018 02:53 AM    GLUCOSE 84 08/22/2018 02:53 AM    BUN 17 08/22/2018 02:53 AM    CREATININE 0.78 08/22/2018 02:53 AM    CREATININE 0.9 02/17/2008 06:58 AM     Lab Results   Component Value Date/Time    ALKPHOSPHAT 55 08/15/2018 04:24 PM    ASTSGOT 31 08/15/2018 04:24 PM    ALTSGPT 137 (H) 08/15/2018 04:24 PM    TBILIRUBIN 0.7 08/15/2018 04:24 PM      Transthoracic Echo Report 1/31/2016  Left ventricle is moderately dilated.  Severely reduced left  ventricular systolic function.  Left ventricular ejection fraction is visually estimated to be 20%.  Grade III diastolic dysfunction (restrictive pattern).  Reduced right ventricular systolic function.  Severe mitral regurgitation.  Severe tricuspid regurgitation.  Right ventricular systolic pressure is estimated to be  45 mmHg.    The medical team was made aware of the above findings, the patient will be seen in cardiology consultation.     Transthoracic Echo Report 7/28/2018  Compare to prior echo from 2016 - no significant change.  Left ventricular ejection fraction is visually estimated to be 20%.  Severe mitral and tricuspid regurgitation due to annular dilation.  Biatrial enlargement.  Estimated right ventricular systolic pressure of 50 mmHg is likely underestimated due to severe tricuspid regurgitation.    Transthoracic Echo Report 7/20/2018  Full echocardiogram was done earlier today.  Thrombus is not observed in the left ventricular apex.      Assessment:     1. ACC/AHA stage C systolic heart failure (HCC)  carvedilol (COREG) 25 MG Tab   2. NYHA class 3 systolic congestive heart failure with reduced left ventricular function (HCC)     3. 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 Polysubstance abuse (HCC)         Medical Decision Making:  Today's Assessment / Status / Plan:   1. HFrEF, Stage C, Class 3-4, LVEF 10%: Patient continues to have volume overload  -Restart furosemide 40 mg twice a day (pharmacy called and his prescription is able to be picked up)  -Continue carvedilol 25 mg twice a day  -Continue spironolactone 25 mg daily  -Continue lisinopril 5 mg daily  -Obtain BMP this week  -Will discuss ICD with Dr. Galvan.  Went through clinical decision tool for ICD with patient.  Patient would like to proceed with ICD.  -Reinforced s/sx of worsening heart failure with patient and weight monitoring. Pt verbalizes understanding. Pt to call office or RTC if present.     2.  History of methamphetamine  use:  -Continue to refrain from meth use  -Drug screen was negative for meth    FU in clinic in 2 weeks with labs this week. Sooner if needed.    Patient verbalizes understanding and agrees with the plan of care.     Collaborating MD: Justin Mayer MD

## 2018-10-24 NOTE — TELEPHONE ENCOUNTER
Nazanin Goodman at [facility], and she stated that the patient had only completed the Drugs of Abuse panel and not the BMP.

## 2018-10-25 NOTE — PROGRESS NOTES
Chief Complaint   Patient presents with   • CHF (Systolic)     x2mon. f/v       Subjective:   Ryan Arteaga is a 38 y.o. male who presents today for follow-up on his heart failure.    Patient Dr. Galvan.  Patient was last seen in clinic on 10/12/2018.  During his last visit, furosemide was increased to 40 mg twice a day for volume overload.    Patient reports over the past 2 weeks, patient has lost about 8 pounds.    Patient states his home weight is back down to 220 pounds.  Patient reports he mistakenly stated the wrong weight at his last visit. Pt reports his dry weight has been as low as 205 lbs.     For his symptoms, he continues to report shortness of breath with ADLs and exertion.  He denies any chest pain, palpitations, orthopnea, PND, edema or dizziness/lightheadedness.  He does continue to have some abdominal bloating.    Patient reports he continues to refrain from recreational drug use.  Patient also states he has not used CPPD edibles since his last use.    Patient would like to continue with ICD placement.    Additonally, patient has the following medical problems:     -History of methamphetamine use about 2-3 years ago.  Patient reports he has not used drugs since.     -Heart failure likely due to remote use of meth.       Past Medical History:   Diagnosis Date   • Anxiety    • Congestive heart failure (HCC)    • Elevated BUN 8/6/2018   • Gastritis      Past Surgical History:   Procedure Laterality Date   • ABDOMINAL EXPLORATION     • OTHER      Submandibular mass with removal     Family History   Problem Relation Age of Onset   • Other Mother         epilepsy   • Hypertension Mother    • Hypertension Father    • No Known Problems Sister    • Lung Disease Maternal Grandmother    • Heart Disease Maternal Grandmother    • Heart Disease Maternal Grandfather    • Lung Disease Maternal Grandfather         emphysema   • Lung Disease Paternal Grandfather         lung ca   • Heart Failure Paternal Grandmother       Social History     Social History   • Marital status: Single     Spouse name: N/A   • Number of children: N/A   • Years of education: N/A     Occupational History   • Not on file.     Social History Main Topics   • Smoking status: Former Smoker     Packs/day: 0.07     Types: Cigarettes   • Smokeless tobacco: Never Used   • Alcohol use No   • Drug use: Yes     Types: Methamphetamines      Comment: quit in [id number]   • Sexual activity: Not Currently     Partners: Female     Other Topics Concern   • Not on file     Social History Narrative   • No narrative on file     No Known Allergies  Outpatient Encounter Prescriptions as of 10/25/2018   Medication Sig Dispense Refill   • carvedilol (COREG) 25 MG Tab Take 1 Tab by mouth 2 times a day, with meals. 60 Tab 11   • furosemide (LASIX) 40 MG Tab Take 1 Tab by mouth 2 Times a Day. 60 Tab 11   • lisinopril (PRINIVIL) 5 MG Tab Take 1 Tab by mouth every day. 30 Tab 11   • potassium chloride SA (K-DUR) 10 MEQ Tab CR Take 1 Tab by mouth every day. 30 Tab 11   • spironolactone (ALDACTONE) 25 MG Tab Take 1 Tab by mouth every day. 30 Tab 11   • aspirin 81 MG EC tablet Take 1 Tab by mouth every day. 30 Tab 0   • hydrOXYzine HCl (ATARAX) 25 MG Tab Take 1 Tab by mouth 3 times a day as needed for Itching. 30 Tab 0   • QUEtiapine (SEROQUEL) 25 MG Tab Take 1 Tab by mouth every 8 hours. 90 Tab 0     No facility-administered encounter medications on file as of 10/25/2018.      Review of Systems   Constitutional: Negative for fever and malaise/fatigue.   Respiratory: Positive for shortness of breath. Negative for cough.    Cardiovascular: Negative for chest pain, palpitations, orthopnea, claudication, leg swelling and PND.   Gastrointestinal: Negative for abdominal pain.        Abdominal bloating   Musculoskeletal: Negative for myalgias.   Neurological: Negative for dizziness.   All other systems reviewed and are negative.       Objective:   /70 (BP Location: Left arm, Patient  Position: Sitting, BP Cuff Size: Adult)   Pulse 84   Ht 1.829 m (6')   Wt 108.4 kg (239 lb)   SpO2 97%   BMI 32.41 kg/m²     Physical Exam   Constitutional: He is oriented to person, place, and time. He appears well-developed and well-nourished.   HENT:   Head: Normocephalic and atraumatic.   Eyes: Pupils are equal, round, and reactive to light. EOM are normal.   Neck: Normal range of motion. Neck supple. JVD present.   Cardiovascular: Normal rate, regular rhythm and normal heart sounds.    Pulmonary/Chest: Effort normal and breath sounds normal. No respiratory distress. He has no wheezes. He has no rales.   Abdominal: Soft. Bowel sounds are normal.   Musculoskeletal: He exhibits no edema.   Neurological: He is alert and oriented to person, place, and time.   Skin: Skin is warm and dry.   Psychiatric: He has a normal mood and affect. His behavior is normal.   Vitals reviewed.    Lab Results   Component Value Date/Time    CHOLSTRLTOT 150 07/29/2018 01:46 AM    LDL 96 07/29/2018 01:46 AM    HDL 19 (A) 07/29/2018 01:46 AM    TRIGLYCERIDE 176 (H) 07/29/2018 01:46 AM       Lab Results   Component Value Date/Time    SODIUM 138 08/22/2018 02:53 AM    POTASSIUM 4.3 08/22/2018 02:53 AM    CHLORIDE 104 08/22/2018 02:53 AM    CO2 24 08/22/2018 02:53 AM    GLUCOSE 84 08/22/2018 02:53 AM    BUN 17 08/22/2018 02:53 AM    CREATININE 0.78 08/22/2018 02:53 AM    CREATININE 0.9 02/17/2008 06:58 AM     Lab Results   Component Value Date/Time    ALKPHOSPHAT 55 08/15/2018 04:24 PM    ASTSGOT 31 08/15/2018 04:24 PM    ALTSGPT 137 (H) 08/15/2018 04:24 PM    TBILIRUBIN 0.7 08/15/2018 04:24 PM      Transthoracic Echo Report 1/31/2016  Left ventricle is moderately dilated.  Severely reduced left ventricular systolic function.  Left ventricular ejection fraction is visually estimated to be 20%.  Grade III diastolic dysfunction (restrictive pattern).  Reduced right ventricular systolic function.  Severe mitral regurgitation.  Severe  tricuspid regurgitation.  Right ventricular systolic pressure is estimated to be  45 mmHg.    The medical team was made aware of the above findings, the patient will be seen in cardiology consultation.     Transthoracic Echo Report 7/28/2018  Compare to prior echo from 2016 - no significant change.  Left ventricular ejection fraction is visually estimated to be 20%.  Severe mitral and tricuspid regurgitation due to annular dilation.  Biatrial enlargement.  Estimated right ventricular systolic pressure of 50 mmHg is likely underestimated due to severe tricuspid regurgitation.    Transthoracic Echo Report 7/20/2018  Full echocardiogram was done earlier today.  Thrombus is not observed in the left ventricular apex.      Assessment:     1. ACC/AHA stage C systolic heart failure (HCC)  EC-ECHOCARDIOGRAM COMPLETE W/O CONT    BASIC METABOLIC PANEL   2. Heart failure, NYHA class 3 (HCC)  EC-ECHOCARDIOGRAM COMPLETE W/O CONT    BASIC METABOLIC PANEL   3. 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 HFrEF, Stage C, Class 3, LVEF 10-20%: pt does have JVD. Pt reports dry weight is about 205 lbs.   -Continue furosemide 40 mg twice a day  -Continue carvedilol 25 mg twice a day  -Continue Spironolactone 25 mg daily  -Continue Lisinopril 5 mg daily  -BMP pending (pt had lab done this am)  -BMP in 1 month  -Hold on repeating Echo until FU with Dr. Galvan   -Discussed ICD with Dr. Galvan.-follow up appt next week with Dr. Galvan to go over with Pt  -Reinforced s/sx of worsening heart failure with patient and weight monitoring. Pt verbalizes understanding. Pt to call office or RTC if present.     2.  History of methamphetamine use:  -Encouraged patient to continue to refrain from drug use  -Prior drug screen was negative for meth    FU in clinic in 1 week with Dr. Galvan. Sooner if needed.    Patient verbalizes understanding and agrees with the plan of care.     Collaborating MD:  Bert Becerril MD

## 2018-10-25 NOTE — PROGRESS NOTES
Chief Complaint:   Chief Complaint   Patient presents with   • Follow-Up   • Difficulty Sleeping       HPI: Established patient  Ryan Arteaga is a 38 y.o. male who presents fo for follow-up and evaluation of insomnia     Insomnia, unspecified type    Patient reports that he is not feeling depressed but he just feels very overwhelmed especially at night unable to sleep or for his brain to shut down because of his recent diagnosis and a lot of medication and concerns about his heart conditions would like something to help him sleep at night   Chronic combined systolic and diastolic congestive heart failure   Reviewed records from cardiology patient continues to take all his medications and follows up with cardiology as directed denies increased shortness of breath or chest pain at this time    Erectile dysfunction, unspecified erectile dysfunction type  Patient reports that for the past couple of months since he has been discharged from the hospital he has noticed that he has some problems with his erection he is concerned because this was not the issue in the past.  Patient in general feels overwhelmed with his heart condition there is a lot of medications added to his system to treat congestive heart failure and low ejection fraction.  Reports occasional preservation of morning erection.          Past medical history, family history, social history and medications reviewed and updated in the record.  Today  Current medications, problem list and allergies reviewed in EPIC today  Health maintenance topics are reviewed and updated.  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) 07/27/2018   • Dyspnea on exertion 01/30/2016   • Pleural effusion, right 01/30/2016   • Elevated brain natriuretic peptide (BNP) level 01/30/2016     Family History   Problem Relation Age of Onset   • Other Mother         epilepsy   • Hypertension Mother    • Hypertension Father    • No Known Problems Sister    • Lung Disease Maternal Grandmother    • Heart Disease Maternal Grandmother    • Heart Disease Maternal Grandfather    • Lung Disease Maternal Grandfather         emphysema   • Lung Disease Paternal Grandfather         lung ca   • Heart Failure Paternal Grandmother      Social History     Social History   • Marital status: Single     Spouse name: N/A   • Number of children: N/A   • Years of education: N/A     Occupational History   • Not on file.     Social History Main Topics   • Smoking status: Former Smoker     Packs/day: 0.07     Types: Cigarettes   • Smokeless tobacco: Never Used   • Alcohol use No   • Drug use: Yes     Types: Methamphetamines      Comment: quit in [id number]   • Sexual activity: Not Currently     Partners: Female     Other Topics Concern   • Not on file     Social History Narrative   • No narrative on file     Current Outpatient Prescriptions   Medication Sig Dispense Refill   • traZODone (DESYREL) 50 MG Tab Take 1 Tab by mouth 1 time daily as needed for Sleep. 30 Tab 6   • carvedilol (COREG) 25 MG Tab Take 1 Tab by mouth 2 times a day, with meals. 60 Tab 11   • furosemide (LASIX) 40 MG Tab Take 1 Tab by mouth 2 Times a Day. 60 Tab 11   • lisinopril (PRINIVIL) 5 MG Tab Take 1 Tab by mouth every day. 30 Tab 11   • potassium chloride SA (K-DUR) 10 MEQ Tab CR Take 1 Tab by mouth every day. 30 Tab 11   • spironolactone (ALDACTONE) 25 MG Tab Take 1 Tab by mouth every day. 30 Tab 11   • aspirin 81 MG EC tablet Take 1 Tab by mouth  every day. 30 Tab 0     No current facility-administered medications for this visit.          Review Of Systems  As documented in HPI above  PHYSICAL EXAMINATION:    /76 (BP Location: Left arm, Patient Position: Sitting, BP Cuff Size: Adult)   Pulse 80   Temp 36.1 °C (97 °F) (Temporal)   Resp 20   Ht 1.829 m (6')   Wt 104.8 kg (231 lb)   SpO2 93%   BMI 31.33 kg/m²   Gen.: Well-developed, well-nourished, no apparent distress, pleasant and cooperative with the examination  HEENT: Normocephalic/atraumatic,    Neck: No JVD or bruits, no adenopathy  Cor: Regular rate and rhythm without murmur gallop or rub  Lungs: Clear to auscultation with equal breath sounds bilaterally. No wheezes, rhonchi.    Extremities: No cyanosis, clubbing or edema          ASSESSMENT/Plan:      1. Insomnia, unspecified type   not related to depression, advised to use trazodone as directed  traZODone (DESYREL) 50 MG Tab   2. Chronic combined systolic and diastolic congestive heart failure (HCC)   stable clinically at this time patient to follow-up with cardiology for further evaluation of low ejection fraction and possible AICD   3. Erectile dysfunction, unspecified erectile dysfunction type   likely related to the recent changes in his health, psychological erectile dysfunction is not ruled out, reassured and advised watchful waiting at this time I advised the patient not to use the Viagra because of his heart condition and low ejection fraction and for fears of hypertension.  We will continue to follow up if not resolved will do more workup to rule out organic causes.       Please note that this dictation was created using voice recognition software. I have worked with consultants from the vendor as well as technical experts from [facility] [employer] to optimize the interface. I have made every reasonable attempt to correct obvious errors, but I expect that there are errors of grammar and possibly content that I did not discover before  finalizing the note.

## 2018-11-28 NOTE — TELEPHONE ENCOUNTER
Left message with patient about no show to appointment yesterday 11/27/18.  Explained that this was his 2nd no show and the no show policy.

## 2019-03-08 ENCOUNTER — PATIENT MESSAGE (OUTPATIENT)
Dept: HEALTH INFORMATION MANAGEMENT | Facility: OTHER | Age: 39
End: 2019-03-08

## 2021-11-05 NOTE — ED NOTES
Inpatient Anticoagulation Service Note    Date: 11/5/2021  Reason for Anticoagulation: Pulmonary Embolism  Hemoglobin Value: 14.2  Hematocrit Value: 50.5  Lab Platelet Value: 190  Target INR: 2.0 to 3.0  INR from last 7 days     Date/Time INR Value    11/05/21 03:50:01 (Abnormal) 2.79    11/04/21 0458 (Abnormal) 5.22    11/03/21 0211 (Abnormal) 2.9    11/02/21 02:24:01 (Abnormal) 3.34    11/01/21 0900 (Abnormal) 3.86        Dose from last 7 days     Date/Time Dose (mg)    11/05/21 1100 3.75    11/04/21 1140 0    11/03/21 1330 7.5    11/02/21 1522 3.75    11/01/21 1212 0        Average Dose (mg):  (7.5 mg daily)  Significant Interactions: Antibiotics  Bridge Therapy: No  Reversal Agent Administered: Not Applicable    A/P  Therapeutic INR - trending down rapidly after holding dose for supra-therapeutic INR.  Suspect fluctuations to INR are secondary to acute illness and reduced oral intake.  Does have a feeding tube in place.  Will give 50% of home dose tonight and give warfarin 3.75 mg.  INR tomorrow.    Education Material Provided?: Yes  Pharmacist suggested discharge dosing: TBD, may be able to return to prior home warfarin regimen once acute illness resolved (warfarin 7.5 mg PO daily).  Repeat INR in 72 hours of discharge.      Penelope Kaba, PharmD, BCPS, BCCCP         Pt continues to hyperventilate, educated on breathing techniques and encourgaed to slow down breathing, ERP informed, new order received.

## 2021-11-15 NOTE — PROGRESS NOTES
"Assumed care at 0715. Pt A/Ox4. Assessment performed, on room air. Pt complains of shortness of breath, offered ordered oxygen, pt declined stating \"it doesn't help\". Educated pt, pt still refused. Bed locked, in lowest position, call light within reach, treaded socks on.   " verbal cues/nonverbal cues (demo/gestures)/1 person assist

## 2024-07-03 ENCOUNTER — DOCUMENTATION (OUTPATIENT)
Dept: HEALTH INFORMATION MANAGEMENT | Facility: OTHER | Age: 44
End: 2024-07-03
Payer: MEDICAID